# Patient Record
Sex: MALE | Race: WHITE | NOT HISPANIC OR LATINO | Employment: PART TIME | URBAN - METROPOLITAN AREA
[De-identification: names, ages, dates, MRNs, and addresses within clinical notes are randomized per-mention and may not be internally consistent; named-entity substitution may affect disease eponyms.]

---

## 2017-02-02 ENCOUNTER — ALLSCRIPTS OFFICE VISIT (OUTPATIENT)
Dept: OTHER | Facility: OTHER | Age: 67
End: 2017-02-02

## 2017-03-02 ENCOUNTER — APPOINTMENT (OUTPATIENT)
Dept: LAB | Facility: HOSPITAL | Age: 67
End: 2017-03-02
Payer: COMMERCIAL

## 2017-03-02 ENCOUNTER — TRANSCRIBE ORDERS (OUTPATIENT)
Dept: LAB | Facility: HOSPITAL | Age: 67
End: 2017-03-02

## 2017-03-02 DIAGNOSIS — I10 ESSENTIAL (PRIMARY) HYPERTENSION: ICD-10-CM

## 2017-03-02 DIAGNOSIS — E11.65 UNCONTROLLED TYPE 2 DIABETES MELLITUS WITH COMPLICATION, UNSPECIFIED LONG TERM INSULIN USE STATUS: Primary | ICD-10-CM

## 2017-03-02 DIAGNOSIS — E11.8 UNCONTROLLED TYPE 2 DIABETES MELLITUS WITH COMPLICATION, UNSPECIFIED LONG TERM INSULIN USE STATUS: Primary | ICD-10-CM

## 2017-03-02 DIAGNOSIS — E11.65 TYPE 2 DIABETES MELLITUS WITH HYPERGLYCEMIA (HCC): ICD-10-CM

## 2017-03-02 LAB
ALBUMIN SERPL BCP-MCNC: 3.5 G/DL (ref 3.5–5)
ALP SERPL-CCNC: 61 U/L (ref 46–116)
ALT SERPL W P-5'-P-CCNC: 47 U/L (ref 12–78)
ANION GAP SERPL CALCULATED.3IONS-SCNC: 7 MMOL/L (ref 4–13)
AST SERPL W P-5'-P-CCNC: 24 U/L (ref 5–45)
BACTERIA UR QL AUTO: NORMAL /HPF
BASOPHILS # BLD AUTO: 0.03 THOUSANDS/ΜL (ref 0–0.1)
BASOPHILS NFR BLD AUTO: 1 % (ref 0–1)
BILIRUB SERPL-MCNC: 0.73 MG/DL (ref 0.2–1)
BILIRUB UR QL STRIP: NEGATIVE
BUN SERPL-MCNC: 11 MG/DL (ref 5–25)
CALCIUM SERPL-MCNC: 8.6 MG/DL (ref 8.3–10.1)
CHLORIDE SERPL-SCNC: 106 MMOL/L (ref 100–108)
CHOLEST SERPL-MCNC: 136 MG/DL (ref 50–200)
CLARITY UR: CLEAR
CO2 SERPL-SCNC: 29 MMOL/L (ref 21–32)
COLOR UR: NORMAL
CREAT SERPL-MCNC: 0.9 MG/DL (ref 0.6–1.3)
CREAT UR-MCNC: 230 MG/DL
EOSINOPHIL # BLD AUTO: 0.33 THOUSAND/ΜL (ref 0–0.61)
EOSINOPHIL NFR BLD AUTO: 6 % (ref 0–6)
ERYTHROCYTE [DISTWIDTH] IN BLOOD BY AUTOMATED COUNT: 13.5 % (ref 11.6–15.1)
EST. AVERAGE GLUCOSE BLD GHB EST-MCNC: 163 MG/DL
GFR SERPL CREATININE-BSD FRML MDRD: >60 ML/MIN/1.73SQ M
GLUCOSE SERPL-MCNC: 139 MG/DL (ref 65–140)
GLUCOSE UR STRIP-MCNC: NEGATIVE MG/DL
HBA1C MFR BLD: 7.3 % (ref 4.2–6.3)
HCT VFR BLD AUTO: 43.3 % (ref 36.5–49.3)
HDLC SERPL-MCNC: 59 MG/DL (ref 40–60)
HGB BLD-MCNC: 14.6 G/DL (ref 12–17)
HGB UR QL STRIP.AUTO: NEGATIVE
HYALINE CASTS #/AREA URNS LPF: NORMAL /LPF
KETONES UR STRIP-MCNC: NEGATIVE MG/DL
LDLC SERPL CALC-MCNC: 63 MG/DL (ref 0–100)
LEUKOCYTE ESTERASE UR QL STRIP: NEGATIVE
LYMPHOCYTES # BLD AUTO: 2.23 THOUSANDS/ΜL (ref 0.6–4.47)
LYMPHOCYTES NFR BLD AUTO: 37 % (ref 14–44)
MCH RBC QN AUTO: 29.9 PG (ref 26.8–34.3)
MCHC RBC AUTO-ENTMCNC: 33.7 G/DL (ref 31.4–37.4)
MCV RBC AUTO: 89 FL (ref 82–98)
MICROALBUMIN UR-MCNC: 45 MG/L (ref 0–20)
MICROALBUMIN/CREAT 24H UR: 20 MG/G CREATININE (ref 0–30)
MONOCYTES # BLD AUTO: 0.36 THOUSAND/ΜL (ref 0.17–1.22)
MONOCYTES NFR BLD AUTO: 6 % (ref 4–12)
NEUTROPHILS # BLD AUTO: 3.06 THOUSANDS/ΜL (ref 1.85–7.62)
NEUTS SEG NFR BLD AUTO: 50 % (ref 43–75)
NITRITE UR QL STRIP: NEGATIVE
NON-SQ EPI CELLS URNS QL MICRO: NORMAL /HPF
NRBC BLD AUTO-RTO: 0 /100 WBCS
PH UR STRIP.AUTO: 6 [PH] (ref 4.5–8)
PLATELET # BLD AUTO: 203 THOUSANDS/UL (ref 149–390)
PMV BLD AUTO: 10.3 FL (ref 8.9–12.7)
POTASSIUM SERPL-SCNC: 4.1 MMOL/L (ref 3.5–5.3)
PROT SERPL-MCNC: 6.5 G/DL (ref 6.4–8.2)
PROT UR STRIP-MCNC: NEGATIVE MG/DL
PSA SERPL-MCNC: 1 NG/ML (ref 0–4)
RBC # BLD AUTO: 4.88 MILLION/UL (ref 3.88–5.62)
RBC #/AREA URNS AUTO: NORMAL /HPF
SODIUM SERPL-SCNC: 142 MMOL/L (ref 136–145)
SP GR UR STRIP.AUTO: 1.02 (ref 1–1.03)
T4 FREE SERPL-MCNC: 0.91 NG/DL (ref 0.76–1.46)
TRIGL SERPL-MCNC: 71 MG/DL
TSH SERPL DL<=0.05 MIU/L-ACNC: 1.39 UIU/ML (ref 0.36–3.74)
UROBILINOGEN UR QL STRIP.AUTO: 0.2 E.U./DL
WBC # BLD AUTO: 6.02 THOUSAND/UL (ref 4.31–10.16)
WBC #/AREA URNS AUTO: NORMAL /HPF

## 2017-03-02 PROCEDURE — 81001 URINALYSIS AUTO W/SCOPE: CPT | Performed by: INTERNAL MEDICINE

## 2017-03-02 PROCEDURE — 80061 LIPID PANEL: CPT

## 2017-03-02 PROCEDURE — 83036 HEMOGLOBIN GLYCOSYLATED A1C: CPT

## 2017-03-02 PROCEDURE — 84439 ASSAY OF FREE THYROXINE: CPT

## 2017-03-02 PROCEDURE — 80053 COMPREHEN METABOLIC PANEL: CPT

## 2017-03-02 PROCEDURE — G0103 PSA SCREENING: HCPCS

## 2017-03-02 PROCEDURE — 82570 ASSAY OF URINE CREATININE: CPT | Performed by: INTERNAL MEDICINE

## 2017-03-02 PROCEDURE — 82043 UR ALBUMIN QUANTITATIVE: CPT | Performed by: INTERNAL MEDICINE

## 2017-03-02 PROCEDURE — 84443 ASSAY THYROID STIM HORMONE: CPT

## 2017-03-02 PROCEDURE — 85025 COMPLETE CBC W/AUTO DIFF WBC: CPT

## 2017-03-02 PROCEDURE — 36415 COLL VENOUS BLD VENIPUNCTURE: CPT

## 2017-03-04 ENCOUNTER — LAB (OUTPATIENT)
Dept: LAB | Facility: HOSPITAL | Age: 67
End: 2017-03-04
Payer: COMMERCIAL

## 2017-03-04 DIAGNOSIS — E78.5 HYPERLIPIDEMIA: ICD-10-CM

## 2017-03-04 DIAGNOSIS — Z00.00 ENCOUNTER FOR GENERAL ADULT MEDICAL EXAMINATION WITHOUT ABNORMAL FINDINGS: ICD-10-CM

## 2017-03-04 DIAGNOSIS — I10 UNSPECIFIED ESSENTIAL HYPERTENSION: Primary | ICD-10-CM

## 2017-03-04 LAB — HEMOCCULT STL QL IA: POSITIVE

## 2017-03-04 PROCEDURE — G0328 FECAL BLOOD SCRN IMMUNOASSAY: HCPCS

## 2017-03-13 ENCOUNTER — ALLSCRIPTS OFFICE VISIT (OUTPATIENT)
Dept: OTHER | Facility: OTHER | Age: 67
End: 2017-03-13

## 2017-03-13 DIAGNOSIS — E11.65 TYPE 2 DIABETES MELLITUS WITH HYPERGLYCEMIA (HCC): ICD-10-CM

## 2017-03-13 LAB
LEFT EYE DIABETIC RETINOPATHY: NORMAL
RIGHT EYE DIABETIC RETINOPATHY: NORMAL

## 2017-03-15 ENCOUNTER — GENERIC CONVERSION - ENCOUNTER (OUTPATIENT)
Dept: OTHER | Facility: OTHER | Age: 67
End: 2017-03-15

## 2017-03-16 ENCOUNTER — ALLSCRIPTS OFFICE VISIT (OUTPATIENT)
Dept: OTHER | Facility: OTHER | Age: 67
End: 2017-03-16

## 2017-06-14 ENCOUNTER — GENERIC CONVERSION - ENCOUNTER (OUTPATIENT)
Dept: OTHER | Facility: OTHER | Age: 67
End: 2017-06-14

## 2017-07-01 ENCOUNTER — TRANSCRIBE ORDERS (OUTPATIENT)
Dept: LAB | Facility: HOSPITAL | Age: 67
End: 2017-07-01

## 2017-07-01 ENCOUNTER — APPOINTMENT (OUTPATIENT)
Dept: LAB | Facility: HOSPITAL | Age: 67
End: 2017-07-01
Payer: COMMERCIAL

## 2017-07-01 DIAGNOSIS — E11.65 TYPE 2 DIABETES MELLITUS WITH HYPERGLYCEMIA (HCC): ICD-10-CM

## 2017-07-01 LAB
EST. AVERAGE GLUCOSE BLD GHB EST-MCNC: 154 MG/DL
GLUCOSE P FAST SERPL-MCNC: 131 MG/DL (ref 65–99)
HBA1C MFR BLD: 7 % (ref 4.2–6.3)

## 2017-07-01 PROCEDURE — 82947 ASSAY GLUCOSE BLOOD QUANT: CPT

## 2017-07-01 PROCEDURE — 36415 COLL VENOUS BLD VENIPUNCTURE: CPT

## 2017-07-01 PROCEDURE — 83036 HEMOGLOBIN GLYCOSYLATED A1C: CPT

## 2017-07-14 ENCOUNTER — ALLSCRIPTS OFFICE VISIT (OUTPATIENT)
Dept: OTHER | Facility: OTHER | Age: 67
End: 2017-07-14

## 2017-07-14 DIAGNOSIS — E55.9 VITAMIN D DEFICIENCY: ICD-10-CM

## 2017-07-14 DIAGNOSIS — E11.65 TYPE 2 DIABETES MELLITUS WITH HYPERGLYCEMIA (HCC): ICD-10-CM

## 2017-11-13 ENCOUNTER — TRANSCRIBE ORDERS (OUTPATIENT)
Dept: LAB | Facility: HOSPITAL | Age: 67
End: 2017-11-13

## 2017-11-13 ENCOUNTER — APPOINTMENT (OUTPATIENT)
Dept: LAB | Facility: HOSPITAL | Age: 67
End: 2017-11-13
Payer: COMMERCIAL

## 2017-11-13 DIAGNOSIS — E11.65 TYPE 2 DIABETES MELLITUS WITH HYPERGLYCEMIA (HCC): ICD-10-CM

## 2017-11-13 DIAGNOSIS — E55.9 VITAMIN D DEFICIENCY: ICD-10-CM

## 2017-11-13 LAB
25(OH)D3 SERPL-MCNC: 55.5 NG/ML (ref 30–100)
EST. AVERAGE GLUCOSE BLD GHB EST-MCNC: 146 MG/DL
GLUCOSE P FAST SERPL-MCNC: 149 MG/DL (ref 65–99)
HBA1C MFR BLD: 6.7 % (ref 4.2–6.3)

## 2017-11-13 PROCEDURE — 36415 COLL VENOUS BLD VENIPUNCTURE: CPT

## 2017-11-13 PROCEDURE — 82947 ASSAY GLUCOSE BLOOD QUANT: CPT

## 2017-11-13 PROCEDURE — 82306 VITAMIN D 25 HYDROXY: CPT

## 2017-11-13 PROCEDURE — 83036 HEMOGLOBIN GLYCOSYLATED A1C: CPT

## 2017-11-27 ENCOUNTER — GENERIC CONVERSION - ENCOUNTER (OUTPATIENT)
Dept: OTHER | Facility: OTHER | Age: 67
End: 2017-11-27

## 2017-11-27 DIAGNOSIS — Z12.11 ENCOUNTER FOR SCREENING FOR MALIGNANT NEOPLASM OF COLON: ICD-10-CM

## 2017-11-27 DIAGNOSIS — E11.65 TYPE 2 DIABETES MELLITUS WITH HYPERGLYCEMIA (HCC): ICD-10-CM

## 2017-11-27 DIAGNOSIS — E55.9 VITAMIN D DEFICIENCY: ICD-10-CM

## 2018-01-12 VITALS
TEMPERATURE: 98.4 F | OXYGEN SATURATION: 98 % | HEART RATE: 89 BPM | WEIGHT: 199.38 LBS | HEIGHT: 68 IN | SYSTOLIC BLOOD PRESSURE: 136 MMHG | DIASTOLIC BLOOD PRESSURE: 86 MMHG | BODY MASS INDEX: 30.22 KG/M2

## 2018-01-13 VITALS
DIASTOLIC BLOOD PRESSURE: 82 MMHG | HEIGHT: 68 IN | OXYGEN SATURATION: 97 % | BODY MASS INDEX: 29.76 KG/M2 | TEMPERATURE: 97.8 F | HEART RATE: 103 BPM | WEIGHT: 196.38 LBS | SYSTOLIC BLOOD PRESSURE: 138 MMHG

## 2018-01-13 VITALS
BODY MASS INDEX: 29.4 KG/M2 | DIASTOLIC BLOOD PRESSURE: 82 MMHG | HEIGHT: 68 IN | SYSTOLIC BLOOD PRESSURE: 136 MMHG | TEMPERATURE: 96.8 F | HEART RATE: 77 BPM | WEIGHT: 194 LBS | OXYGEN SATURATION: 98 %

## 2018-01-14 VITALS
DIASTOLIC BLOOD PRESSURE: 74 MMHG | HEART RATE: 93 BPM | OXYGEN SATURATION: 99 % | TEMPERATURE: 96.8 F | HEIGHT: 68 IN | WEIGHT: 196 LBS | BODY MASS INDEX: 29.7 KG/M2 | SYSTOLIC BLOOD PRESSURE: 122 MMHG

## 2018-01-17 NOTE — PROGRESS NOTES
Assessment    1  Type 2 diabetes mellitus with hyperglycemia (250 00) (E11 65)   2  Hyperlipidemia (272 4) (E78 5)   3  Encounter for preventive health examination (V70 0) (Z00 00)   4  Vitamin D deficiency (268 9) (E55 9)    Plan  Health Maintenance, Hyperlipidemia    · (1) TSH WITH FT4 REFLEX; Status:Active - Retrospective By Protocol Authorization; Requested for:25Mar2016; Health Maintenance, Hyperlipidemia, Type 2 diabetes mellitus with hyperglycemia    · (1) CBC/PLT/DIFF; Status:Active - Retrospective By Protocol Authorization; Requested  for:25Mar2016;    · (1) COMPREHENSIVE METABOLIC PANEL; Status:Active - Retrospective By Protocol  Authorization; Requested for:25Mar2016;    · (1) PSA FREE & TOTAL; Status:Active - Retrospective By Protocol Authorization; Requested for:25Mar2016; Health Maintenance, Vitamin D deficiency    · (1) VITAMIN D 25-HYDROXY; Status:Active - Retrospective By Protocol Authorization; Requested for:25Mar2016;   Hyperlipidemia    · Atorvastatin Calcium 10 MG Oral Tablet   · (1) LIPID PANEL FASTING W DIRECT LDL REFLEX; Status:Active - Retrospective By  Protocol Authorization; Requested for:25Mar2016;   Type 2 diabetes mellitus with hyperglycemia    · (1) GLUCOSE,  FASTING; Status:Active; Requested for:25Mar2016;    · (1) HEMOGLOBIN A1C; Status:Active; Requested for:25Mar2016;    · *VB-Foot Exam; Status:Active; Requested for:25Mar2016;    · Follow-up visit in 6 months Evaluation and Treatment  Follow-up  Status: Hold For -  Scheduling  Requested for: [id number]    Discussion/Summary  Impression: health maintenance visit  Currently, he eats an adequate diet and has an inadequate exercise regimen  Prostate cancer screening: the risks and benefits of prostate cancer screening were discussed, prostate cancer screening is current and PSA was ordered   Testicular cancer screening: the risks and benefits of testicular cancer screening were discussed and testicular cancer screening is current  Colorectal cancer screening: the risks and benefits of colorectal cancer screening were discussed, fecal occult blood testing is needed every year and colonoscopy has been ordered  The risks and benefits of immunizations were discussed, immunizations are needed and patient declines immunizations  Advice and education were given regarding nutrition, aerobic exercise, weight loss and vitamin D supplements  Patient discussion: discussed with the patient  #1  Diabetes mellitus type 2  As noted patient did not have his labs done prior to that visit today so we do not know about his control of his diabetes  Patient's last hemoglobin A1c was 6 8  Patient promises to have the labs done within the next week and we will review the results and we will also have these checked again in another 6 months  If his sugars out of control we'll modify treatment  #2  Hyperlipidemia  As noted we have no idea where his cholesterol is and again he will have the labs done in the near future  #3  Vitamin D deficiency  Patient was encouraged to continue his supplements and we'll check a level  #4  Health maintenance  I discussed with the patient's the importance of seeing the eye doctor on a yearly basis with his diabetes  Importantly also was the importance of having routine colonoscopy  Patient's labs should include a PSA and his digital rectal examination was essentially normal    Possible side effects of new medications were reviewed with the patient/guardian today  The patient was counseled regarding instructions for management, risk factor reductions, prognosis, patient and family education, impressions, risks and benefits of treatment options, importance of compliance with treatment  Chief Complaint  Patient is here today for his yearly physical       History of Present Illness  , Adult Male: The patient is being seen for a health maintenance evaluation   The last health maintenance visit was One year year(s) ago  Social History: Household members include spouse  He is   Work status: working full time and ,   The patient is a former cigarette smoker  He has smoked for 20 pack years year(s)  He reports rare alcohol use and He states he is a social drinker  The patient has no concerns about alcohol abuse  He has never used illicit drugs  General Health: The patient's health since the last visit is described as good  He does not have regular dental visits  He denies vision problems  He denies hearing loss  Immunizations status: not up to date  Lifestyle:  He consumes a diverse and healthy diet  He does not have any weight concerns  He does not exercise regularly  He does not use tobacco  He consumes alcohol  He denies drug use  Reproductive health:  the patient is sexually active  birth control is not being practiced  He denies erectile dysfunction  Screening: cancer screening reviewed and updated  metabolic screening reviewed and updated  risk screening reviewed and updated  HPI: Patient is a 61-year-old male with a history of diabetes mellitus type 2, vitamin D deficiency, hyperlipidemia  Patient is here today for routine physical  Patient neglected to have routine labs prior to his visit today  He states he's been feeling well and is working hard and has no new complaints or problems  Patient promises to have the labs performed in the very near future  Patient is been neglectful as far as his diet and exercise program is concerned that he states that's because she's been working so very hard  He denies any chest pain or shortness of breath  He states his appetite is good and he stays away from concentrated sweets is much as possible  Review of Systems    Constitutional: No fever or chills, feels well, no tiredness, no recent weight gain or weight loss  Eyes: No complaints of eye pain, no red eyes, no discharge from eyes, no itchy eyes     ENT: no complaints of earache, no hearing loss, no nosebleeds, no nasal discharge, no sore throat, no hoarseness  Cardiovascular: No complaints of slow heart rate, no fast heart rate, no chest pain, no palpitations, no leg claudication, no lower extremity  Respiratory: No complaints of shortness of breath, no wheezing, no cough, no SOB on exertion, no orthopnea or PND  Gastrointestinal: No complaints of abdominal pain, no constipation, no nausea or vomiting, no diarrhea or bloody stools  Genitourinary: No complaints of dysuria, no incontinence, no hesitancy, no nocturia, no genital lesion, no testicular pain  Musculoskeletal: No complaints of arthralgia, no myalgias, no joint swelling or stiffness, no limb pain or swelling  Integumentary: No complaints of skin rash or skin lesions, no itching, no skin wound, no dry skin  Neurological: No compliants of headache, no confusion, no convulsions, no numbness or tingling, no dizziness or fainting, no limb weakness, no difficulty walking  Psychiatric: Is not suicidal, no sleep disturbances, no anxiety or depression, no change in personality, no emotional problems  Endocrine: No complaints of proptosis, no hot flashes, no muscle weakness, no erectile dysfunction, no deepening of the voice, no feelings of weakness  Hematologic/Lymphatic: No complaints of swollen glands, no swollen glands in the neck, does not bleed easily, no easy bruising  Active Problems    1  Acute sinusitis (461 9) (J01 90)   2  Hyperlipidemia (272 4) (E78 5)   3  Type 2 diabetes mellitus with hyperglycemia (250 00) (E11 65)   4  Upper Respiratory Tract Disorders (478 9)   5   Vitamin D deficiency (268 9) (E55 9)    Past Medical History    · History of hyperlipidemia (V12 29) (Z86 39)    Surgical History    · History of Appendectomy    Family History    · Family history of Parkinson Disease    · Family history of Diabetes Mellitus (V18 0)   · Family history of Malignant Pancreatic Neoplasm    Social History    · Being A Social Drinker   · Former smoker (G70 14) (O82 600)    Current Meds   1  Atorvastatin Calcium 10 MG Oral Tablet; take 1 tablet by mouth one time daily; Therapy: 86CFQ8351 to (Evaluate:11Mar2017)  Requested for: 69WLS8605; Last   Rx:16Mar2016 Ordered   2  Atorvastatin Calcium 10 MG Oral Tablet; take 1 tablet by mouth one time daily; Therapy: 86OXX4530 to (Last Rx:26Qys3500)  Requested for: 79Tww8125 Ordered   3  MetFORMIN HCl ER (OSM) 500 MG Oral Tablet Extended Release 24 Hour; Two pills   daily; Therapy: 22XAM3783 to (Last Rx:03Mar2015)  Requested for: 52OJA4732 Ordered   4  MetFORMIN HCl  MG Oral Tablet Extended Release 24 Hour; take 1 tablet by   mouth one time daily; Therapy: 65JLV0637 to (Evaluate:05Jan2017)  Requested for: 27ZXY3514; Last   Rx:11Jan2016 Ordered   5  MetFORMIN HCl  MG Oral Tablet Extended Release 24 Hour; take 1 tablet by   mouth one time daily; Therapy: 30SPO7994 to (Last Candace Flair)  Requested for: 63Tcn8900 Ordered   6  Vitamin D (Ergocalciferol) 44955 UNIT Oral Capsule; Therapy: 75DLQ9178 to (Last Rx:49Ecg6779)  Requested for: 11Eoe3992 Ordered    Allergies    1  No Known Drug Allergies    Vitals   Recorded: 33PDG3490 01:35PM   Temperature 98 2 F   Heart Rate 102   Respiration 18   Systolic 693   Diastolic 86   Height 5 ft 8 in   Weight 193 lb 4 00 oz   BMI Calculated 29 38   BSA Calculated 2 02   O2 Saturation 97     Physical Exam    Constitutional Pleasant healthy-appearing articulate 62-year-old male who is awake alert in no acute distress and oriented x3  Head and Face   Head and face: Normal   Normal male pattern balding  Palpation of the face and sinuses: No sinus tenderness  Eyes   Conjunctiva and lids: No erythema, swelling or discharge  Pupils and irises: Equal, round, reactive to light  Posterior small unable to see fundi the patient does get yearly exams     Ears, Nose, Mouth, and Throat   External inspection of ears and nose: Normal     Otoscopic examination: Tympanic membranes translucent with normal light reflex  Canals patent without erythema  Hearing: Normal     Nasal mucosa, septum, and turbinates: Normal without edema or erythema  Lips, teeth, and gums: Normal, good dentition  Oropharynx: Normal with no erythema, edema, exudate or lesions  Neck   Neck: Supple, symmetric, trachea midline, no masses  Thyroid: Normal, no thyromegaly  Pulmonary   Respiratory effort: No increased work of breathing or signs of respiratory distress  Percussion of chest: Normal     Palpation of chest: Normal     Auscultation of lungs: Clear to auscultation  Cardiovascular   Palpation of heart: Normal PMI, no thrills  Auscultation of heart: Normal rate and rhythm, normal S1 and S2, no murmurs  Carotid pulses: 2+ bilaterally  Abdominal aorta: Normal     Femoral pulses: 2+ bilaterally  Pedal pulses: 2+ bilaterally  Peripheral vascular exam: Normal     Examination of extremities for edema and/or varicosities: Normal     Chest   Breasts: Normal, no dimpling or skin changes appreciated  Palpation of breasts and axillae: Normal, no masses palpated  Chest: Normal     Abdomen   Abdomen: Non-tender, no masses  Liver and spleen: No hepatomegaly or splenomegaly  Examination for hernias: No hernias appreciated  Anus, perineum, and rectum: Normal sphincter tone, no masses, no prolapse  Genitourinary   Scrotal contents: Normal testes, no masses  Penis: Normal, no lesions  Digital rectal exam of prostate: Abnormal   Mildly enlarged prostate but no masses  Lymphatic   Palpation of lymph nodes in neck: No lymphadenopathy  Palpation of lymph nodes in axillae: No lymphadenopathy  Palpation of lymph nodes in groin: No lymphadenopathy  Palpation of lymph nodes in other areas: No lymphadenopathy      Musculoskeletal   Gait and station: Normal     Inspection/palpation of digits and nails: Normal without clubbing or cyanosis  Inspection/palpation of joints, bones, and muscles: Normal     Range of motion: Normal     Stability: Normal     Muscle strength/tone: Normal     Skin   Skin and subcutaneous tissue: Normal without rashes or lesions  Palpation of skin and subcutaneous tissue: Normal turgor  Neurologic   Cranial nerves: Cranial nerves 2-12 intact  Cortical function: Normal mental status  Reflexes: 2+ and symmetric  Sensation: No sensory loss  Coordination: Normal finger to nose and heel to shin  Diabetic Foot Exam: Right Foot Findings: normal foot  The toes were normal and had full range of motion  Left Foot Findings: normal foot  The toes were normal and had full range of motion  Monofilament Testing: normal tactile sensation with monofilament testing throughout both feet  Vascular: Capillary refills findings on the right were normal in the toes  Capillary refills findings on the left were normal in the toes  Psychiatric   Judgment and insight: Normal     Orientation to person, place and time: Normal     Recent and remote memory: Intact  Mood and affect: Normal        Health Management  Type 2 diabetes mellitus with hyperglycemia   *VB - Eye Exam; every 1 year; Next Due: 20Apr2015;  Overdue    Future Appointments    Date/Time Provider Specialty Site   09/30/2016 01:30 PM Eleni Guzmán DO Internal Medicine [facility] INTERNAL MED     Signatures   Electronically signed by : Sissy Venegas DO; Mar 25 2016  4:34PM EST                       (Author)

## 2018-01-22 ENCOUNTER — ALLSCRIPTS OFFICE VISIT (OUTPATIENT)
Dept: OTHER | Facility: OTHER | Age: 68
End: 2018-01-22

## 2018-01-22 VITALS
HEIGHT: 68 IN | BODY MASS INDEX: 29.18 KG/M2 | OXYGEN SATURATION: 98 % | WEIGHT: 192.5 LBS | HEART RATE: 81 BPM | DIASTOLIC BLOOD PRESSURE: 76 MMHG | SYSTOLIC BLOOD PRESSURE: 124 MMHG | TEMPERATURE: 97.7 F

## 2018-01-23 NOTE — PROGRESS NOTES
Assessment   1  Hypertension (401 9) (I10)   2  Acute sinusitis (461 9) (J01 90)   3  Type 2 diabetes mellitus with hyperglycemia (250 00) (E11 65)    Plan   Hypertension    · Lisinopril 10 MG Oral Tablet; Take 1 tablet daily    Discussion/Summary      1  Hypertension  As noted patient's blood pressure was elevated an acute visit approximately 10 days ago when he was seen in [place] and he was placed on lisinopril with hydrochlorothiazide which is appropriate medication  Despite started on the medication his blood pressure is still not to goal and we have taken away the hydrochlorothiazide as a component of this medication and he has been started on lisinopril 10 mg daily  He was informed that the medication can have a problem with paroxysmal cough  He is due to be seen in the office in a few weeks for re-evaluation  Was told to stop the medication or call us immediately if any problems with lightheadedness or dizziness  Acute sinusitis  Patient states he is improving overall but he still has residual cough  We will be checking the patient in the near future and he was told if still problems with cough might switch him from lisinopril to another medication to control his blood pressure  Diabetes mellitus type 2  Patient is due to have testing done in the near future looking at a hemoglobin A1c, fasting blood sugar  The patient was counseled regarding diagnostic results,-- instructions for management,-- risk factor reductions,-- prognosis,-- patient and family education,-- impressions,-- risks and benefits of treatment options,-- importance of compliance with treatment  Possible side effects of new medications were reviewed with the patient/guardian today  The treatment plan was reviewed with the patient/guardian   The patient/guardian understands and agrees with the treatment plan      Chief Complaint   Patient is here today for elevated blood pressure      History of Present Illness   HPI: Patient is a 59-year-old male with history of hypertension, hyperlipidemia, diabetes mellitus type 2  Patient relates that he was out of town in [place] and had signs and symptoms of an upper respiratory, sinus infection  Patient was seen in the urgent care facility was noted to have a blood pressure elevated with systolic in the 563N  He was told that this need to be treated immediately and besides being placed on penicillin for sinus infection he was also placed at the same time on lisinopril hydrochlorothiazide for his blood pressure  Patient was given a 10 day course of the medication and states he has been taking it daily and is here for blood pressure check  As noted patient's blood pressure is improved but is not down to his baseline  He states that while he was suffering from an upper respiratory tract infection he was taking some over-the-counter decongestants which may have falsely elevated his blood pressure readings  Hospital Based Practices Required Assessment:      Pain Assessment      the patient states they do not have pain  Abuse And Domestic Violence Screen       Yes, the patient is safe at home  -- The patient states no one is hurting them  Depression And Suicide Screen  No, the patient has not had thoughts of hurting themself  No, the patient has not felt depressed in the past 7 days  Primary Language is  English  Readiness To Learn: Receptive  Barriers To Learning: none  Preferred Learning: verbal      Education Completed: disease/condition,-- medications-- and-- further treatment/follow-up      Teaching Method: verbal      Person Taught: patient      Review of Systems        Constitutional: no fever,-- not feeling poorly,-- no recent weight gain,-- no chills,-- not feeling tired-- and-- no recent weight loss  ENT: nasal discharge, but-- no earache,-- no nosebleeds,-- no sore throat,-- no hearing loss-- and-- no hoarseness        Cardiovascular: no complaints of slow or fast heart rate, no chest pain, no palpitations, no leg claudication or lower extremity edema  Respiratory: cough-- and-- Patient states he still has a persistent cough which was there before he was treated with blood pressure medication  He states that is getting better, but-- no shortness of breath,-- no orthopnea,-- no wheezing,-- no shortness of breath during exertion-- and-- no PND  Gastrointestinal: no complaints of abdominal pain, no constipation, no nausea or vomiting, no diarrhea or bloody stools  Genitourinary: no complaints of dysuria or incontinence, no hesitancy, no nocturia, no genital lesion, no inadequacy of penile erection  Musculoskeletal: no complaints of arthralgia, no myalgia, no joint swelling or stiffness, no limb pain or swelling  Integumentary: no complaints of skin rash or lesion, no itching or dry skin, no skin wounds  Neurological: no complaints of headache, no confusion, no numbness or tingling, no dizziness or fainting  Active Problems   1  Acute sinusitis (461 9) (J01 90)   2  Bronchitis (490) (J40)   3  Colon cancer screening (V76 51) (Z12 11)   4  Hyperlipidemia (272 4) (E78 5)   5  Hypertension (401 9) (I10)   6  Type 2 diabetes mellitus with hyperglycemia (250 00) (E11 65)   7  Upper Respiratory Tract Disorders (478 9)   8  Vitamin D deficiency (268 9) (E55 9)   9  Vomiting (787 03) (R11 10)    Past Medical History   Active Problems And Past Medical History Reviewed: The active problems and past medical history were reviewed and updated today  Surgical History   Surgical History Reviewed: The surgical history was reviewed and updated today  Social History    · Being A Social Drinker   · Former smoker (N25 87) (O42 492)  The social history was reviewed and updated today  The social history was reviewed and is unchanged  Family History   Family History Reviewed: The family history was reviewed and updated today  Current Meds    1  Atorvastatin Calcium 10 MG Oral Tablet; Take 1 tablet daily; Therapy: 82KXY6973 to (Last Rx:77Ajr8535)  Requested for: 51CEJ2169 Ordered   2  Lisinopril-Hydrochlorothiazide 10-12 5 MG Oral Tablet; take one tablet by mouth every     day; Therapy: 16ALP2487 to (Evaluate:17Jan2019) Recorded   3  MetFORMIN HCl  MG Oral Tablet Extended Release 24 Hour; TAKE 1 TABLET     DAILY; Therapy: 41RDG4019 to (Ayo Mahsa)  Requested for: 24Oct2016; Last     Rx:24Oct2016 Ordered   4  Vitamin D (Ergocalciferol) 18473 UNIT Oral Capsule; Therapy: 36IUS0618 to (Last Rx:12Lyc6752)  Requested for: 77Ooc2822 Ordered     The medication list was reviewed and updated today  Allergies   1  No Known Drug Allergies    Vitals    Recorded: 47CLY3864 12:53PM   Temperature 97 4 F   Heart Rate 97   Systolic 324   Diastolic 82   Height 5 ft 8 in   Weight 194 lb    BMI Calculated 29 5   BSA Calculated 2 02   O2 Saturation 98     Physical Exam        Constitutional Pleasant cheerful 44-year-old male who is awake alert in no acute distress  Ears, Nose, Mouth, and Throat      External inspection of ears and nose: Normal        Otoscopic examination: Tympanic membrance translucent with normal light reflex  Canals patent without erythema  Nasal mucosa, septum, and turbinates: Abnormal  -- Slight inflammation and erythema to near ease with clear nasal discharge  Oropharynx: Abnormal  -- Mild erythema to posterior airway with a whitish postnasal drip  Pulmonary      Respiratory effort: No increased work of breathing or signs of respiratory distress  Auscultation of lungs: Clear to auscultation, equal breath sounds bilaterally, no wheezes, no rales, no rhonci  Cardiovascular      Palpation of heart: Normal PMI, no thrills  Auscultation of heart: Normal rate and rhythm, normal S1 and S2, without murmurs         Examination of extremities for edema and/or varicosities: Normal        Carotid pulses: Normal        Musculoskeletal      Gait and station: Normal        Skin      Skin and subcutaneous tissue: Normal without rashes or lesions  Neurologic      Cranial nerves: Cranial nerves 2-12 intact         Psychiatric      Orientation to person, place and time: Normal        Mood and affect: Normal           Future Appointments      Date/Time Provider Specialty Site   03/30/2018 02:00 PM Lesa Birmingham DO Internal Medicine [facility] [place] [facility] 40    Electronically signed by : Katherine Ling DO; Jan 22 2018  1:35PM EST                       (Author)

## 2018-01-30 DIAGNOSIS — E78.00 PURE HYPERCHOLESTEROLEMIA: Primary | ICD-10-CM

## 2018-01-30 RX ORDER — ATORVASTATIN CALCIUM 10 MG/1
1 TABLET, FILM COATED ORAL DAILY
COMMUNITY
Start: 2013-11-20 | End: 2018-01-30 | Stop reason: SDUPTHER

## 2018-01-30 RX ORDER — ATORVASTATIN CALCIUM 10 MG/1
10 TABLET, FILM COATED ORAL DAILY
Qty: 90 TABLET | Refills: 3 | Status: SHIPPED | OUTPATIENT
Start: 2018-01-30 | End: 2018-04-06 | Stop reason: SDUPTHER

## 2018-02-26 DIAGNOSIS — J06.9 UPPER RESPIRATORY TRACT INFECTION, UNSPECIFIED TYPE: Primary | ICD-10-CM

## 2018-02-26 RX ORDER — AMOXICILLIN 500 MG/1
500 CAPSULE ORAL EVERY 8 HOURS SCHEDULED
Status: DISCONTINUED | OUTPATIENT
Start: 2018-02-26 | End: 2018-12-03

## 2018-04-06 DIAGNOSIS — E78.00 PURE HYPERCHOLESTEROLEMIA: ICD-10-CM

## 2018-04-06 RX ORDER — ATORVASTATIN CALCIUM 10 MG/1
10 TABLET, FILM COATED ORAL DAILY
Qty: 90 TABLET | Refills: 2 | Status: SHIPPED | OUTPATIENT
Start: 2018-04-06 | End: 2018-06-04 | Stop reason: SDUPTHER

## 2018-04-07 ENCOUNTER — TRANSCRIBE ORDERS (OUTPATIENT)
Dept: ADMINISTRATIVE | Age: 68
End: 2018-04-07

## 2018-04-07 ENCOUNTER — APPOINTMENT (OUTPATIENT)
Dept: LAB | Age: 68
End: 2018-04-07
Payer: COMMERCIAL

## 2018-04-07 DIAGNOSIS — Z12.11 ENCOUNTER FOR SCREENING FOR MALIGNANT NEOPLASM OF COLON: ICD-10-CM

## 2018-04-07 DIAGNOSIS — E55.9 VITAMIN D DEFICIENCY: ICD-10-CM

## 2018-04-07 DIAGNOSIS — E11.65 TYPE 2 DIABETES MELLITUS WITH HYPERGLYCEMIA (HCC): ICD-10-CM

## 2018-04-07 LAB
25(OH)D3 SERPL-MCNC: 40.9 NG/ML (ref 30–100)
ALBUMIN SERPL BCP-MCNC: 4 G/DL (ref 3.5–5)
ALP SERPL-CCNC: 51 U/L (ref 46–116)
ALT SERPL W P-5'-P-CCNC: 49 U/L (ref 12–78)
ANION GAP SERPL CALCULATED.3IONS-SCNC: 6 MMOL/L (ref 4–13)
AST SERPL W P-5'-P-CCNC: 27 U/L (ref 5–45)
BASOPHILS # BLD AUTO: 0.03 THOUSANDS/ΜL (ref 0–0.1)
BASOPHILS NFR BLD AUTO: 0 % (ref 0–1)
BILIRUB SERPL-MCNC: 0.7 MG/DL (ref 0.2–1)
BUN SERPL-MCNC: 14 MG/DL (ref 5–25)
CALCIUM SERPL-MCNC: 8.8 MG/DL (ref 8.3–10.1)
CHLORIDE SERPL-SCNC: 108 MMOL/L (ref 100–108)
CHOLEST SERPL-MCNC: 147 MG/DL (ref 50–200)
CO2 SERPL-SCNC: 26 MMOL/L (ref 21–32)
CREAT SERPL-MCNC: 0.97 MG/DL (ref 0.6–1.3)
CREAT UR-MCNC: 249 MG/DL
EOSINOPHIL # BLD AUTO: 0.41 THOUSAND/ΜL (ref 0–0.61)
EOSINOPHIL NFR BLD AUTO: 5 % (ref 0–6)
ERYTHROCYTE [DISTWIDTH] IN BLOOD BY AUTOMATED COUNT: 13.7 % (ref 11.6–15.1)
EST. AVERAGE GLUCOSE BLD GHB EST-MCNC: 154 MG/DL
GFR SERPL CREATININE-BSD FRML MDRD: 82 ML/MIN/1.73SQ M
GLUCOSE P FAST SERPL-MCNC: 154 MG/DL (ref 65–99)
HBA1C MFR BLD: 7 % (ref 4.2–6.3)
HCT VFR BLD AUTO: 46.5 % (ref 36.5–49.3)
HDLC SERPL-MCNC: 61 MG/DL (ref 40–60)
HGB BLD-MCNC: 15.4 G/DL (ref 12–17)
LDLC SERPL CALC-MCNC: 62 MG/DL (ref 0–100)
LYMPHOCYTES # BLD AUTO: 2.53 THOUSANDS/ΜL (ref 0.6–4.47)
LYMPHOCYTES NFR BLD AUTO: 31 % (ref 14–44)
MCH RBC QN AUTO: 30.3 PG (ref 26.8–34.3)
MCHC RBC AUTO-ENTMCNC: 33.1 G/DL (ref 31.4–37.4)
MCV RBC AUTO: 92 FL (ref 82–98)
MICROALBUMIN UR-MCNC: 30.1 MG/L (ref 0–20)
MICROALBUMIN/CREAT 24H UR: 12 MG/G CREATININE (ref 0–30)
MONOCYTES # BLD AUTO: 0.71 THOUSAND/ΜL (ref 0.17–1.22)
MONOCYTES NFR BLD AUTO: 9 % (ref 4–12)
NEUTROPHILS # BLD AUTO: 4.38 THOUSANDS/ΜL (ref 1.85–7.62)
NEUTS SEG NFR BLD AUTO: 55 % (ref 43–75)
NONHDLC SERPL-MCNC: 86 MG/DL
NRBC BLD AUTO-RTO: 0 /100 WBCS
PLATELET # BLD AUTO: 233 THOUSANDS/UL (ref 149–390)
PMV BLD AUTO: 10.2 FL (ref 8.9–12.7)
POTASSIUM SERPL-SCNC: 4.3 MMOL/L (ref 3.5–5.3)
PROT SERPL-MCNC: 7.2 G/DL (ref 6.4–8.2)
RBC # BLD AUTO: 5.08 MILLION/UL (ref 3.88–5.62)
SODIUM SERPL-SCNC: 140 MMOL/L (ref 136–145)
TRIGL SERPL-MCNC: 120 MG/DL
TSH SERPL DL<=0.05 MIU/L-ACNC: 1.55 UIU/ML (ref 0.36–3.74)
WBC # BLD AUTO: 8.08 THOUSAND/UL (ref 4.31–10.16)

## 2018-04-07 PROCEDURE — 82306 VITAMIN D 25 HYDROXY: CPT

## 2018-04-07 PROCEDURE — 80061 LIPID PANEL: CPT

## 2018-04-07 PROCEDURE — 80053 COMPREHEN METABOLIC PANEL: CPT

## 2018-04-07 PROCEDURE — 82043 UR ALBUMIN QUANTITATIVE: CPT

## 2018-04-07 PROCEDURE — 85025 COMPLETE CBC W/AUTO DIFF WBC: CPT

## 2018-04-07 PROCEDURE — 3061F NEG MICROALBUMINURIA REV: CPT | Performed by: INTERNAL MEDICINE

## 2018-04-07 PROCEDURE — 84443 ASSAY THYROID STIM HORMONE: CPT

## 2018-04-07 PROCEDURE — 36415 COLL VENOUS BLD VENIPUNCTURE: CPT

## 2018-04-07 PROCEDURE — 82570 ASSAY OF URINE CREATININE: CPT

## 2018-04-07 PROCEDURE — 83036 HEMOGLOBIN GLYCOSYLATED A1C: CPT

## 2018-04-08 ENCOUNTER — APPOINTMENT (OUTPATIENT)
Dept: LAB | Age: 68
End: 2018-04-08
Payer: COMMERCIAL

## 2018-04-08 DIAGNOSIS — Z12.11 ENCOUNTER FOR SCREENING FOR MALIGNANT NEOPLASM OF COLON: ICD-10-CM

## 2018-04-08 DIAGNOSIS — E11.65 TYPE 2 DIABETES MELLITUS WITH HYPERGLYCEMIA (HCC): ICD-10-CM

## 2018-04-08 LAB — HEMOCCULT STL QL IA: NEGATIVE

## 2018-04-08 PROCEDURE — G0328 FECAL BLOOD SCRN IMMUNOASSAY: HCPCS

## 2018-04-16 ENCOUNTER — OFFICE VISIT (OUTPATIENT)
Dept: INTERNAL MEDICINE CLINIC | Facility: CLINIC | Age: 68
End: 2018-04-16
Payer: COMMERCIAL

## 2018-04-16 VITALS
DIASTOLIC BLOOD PRESSURE: 68 MMHG | OXYGEN SATURATION: 98 % | TEMPERATURE: 97.9 F | HEART RATE: 86 BPM | HEIGHT: 69 IN | WEIGHT: 199 LBS | SYSTOLIC BLOOD PRESSURE: 140 MMHG | BODY MASS INDEX: 29.47 KG/M2

## 2018-04-16 DIAGNOSIS — Z12.5 PROSTATE CANCER SCREENING: ICD-10-CM

## 2018-04-16 DIAGNOSIS — E78.01 FAMILIAL HYPERCHOLESTEROLEMIA: ICD-10-CM

## 2018-04-16 DIAGNOSIS — E11.9 TYPE 2 DIABETES MELLITUS WITHOUT COMPLICATION, WITHOUT LONG-TERM CURRENT USE OF INSULIN (HCC): ICD-10-CM

## 2018-04-16 DIAGNOSIS — E55.9 VITAMIN D DEFICIENCY: ICD-10-CM

## 2018-04-16 DIAGNOSIS — E11.65 TYPE 2 DIABETES MELLITUS WITH HYPERGLYCEMIA, WITHOUT LONG-TERM CURRENT USE OF INSULIN (HCC): ICD-10-CM

## 2018-04-16 DIAGNOSIS — I10 ESSENTIAL HYPERTENSION: Primary | ICD-10-CM

## 2018-04-16 PROCEDURE — 99214 OFFICE O/P EST MOD 30 MIN: CPT | Performed by: INTERNAL MEDICINE

## 2018-04-16 PROCEDURE — 1101F PT FALLS ASSESS-DOCD LE1/YR: CPT | Performed by: INTERNAL MEDICINE

## 2018-04-16 RX ORDER — LISINOPRIL 10 MG/1
1 TABLET ORAL DAILY
COMMUNITY
Start: 2018-01-22 | End: 2018-05-03 | Stop reason: SDUPTHER

## 2018-04-16 RX ORDER — METFORMIN HYDROCHLORIDE 500 MG/1
1 TABLET, EXTENDED RELEASE ORAL DAILY
COMMUNITY
Start: 2016-10-24 | End: 2018-04-30 | Stop reason: SDUPTHER

## 2018-04-16 RX ORDER — ERGOCALCIFEROL 1.25 MG/1
CAPSULE ORAL
COMMUNITY
Start: 2011-07-13

## 2018-04-16 NOTE — ASSESSMENT & PLAN NOTE
Diabetes mellitus type 2  Patient did have labs performed prior to the visit today and were happy to report that his sugars still under good control with a hemoglobin A1c of 7 0  We again discussed with the patient the importance of watching his intake of concentrated sweets and simple carbohydrates in order to maintain good control of his diabetes    He will have this checked again with his next visit

## 2018-04-16 NOTE — PROGRESS NOTES
Diabetic Foot Exam    Patient's shoes and socks removed  Right Foot/Ankle   Right Foot Inspection  Skin Exam: skin normal and skin intact no dry skin, no warmth, no callus, no erythema, no maceration, no abnormal color, no pre-ulcer, no ulcer and no callus                          Toe Exam: no swelling, no tenderness, erythema and  no right toe deformity  Sensory   Vibration: intact  Proprioception: intact   Monofilament testing: intact      Left Foot/Ankle  Left Foot Inspection  Skin Exam: skin normal and skin intactno dry skin, no warmth, no erythema, no maceration, normal color, no pre-ulcer, no ulcer and no callus                         Toe Exam: no tenderness and no left toe deformity                   Sensory   Vibration: intact  Proprioception: intact  Monofilament: intact    Assign Risk Category:  No deformity present; No loss of protective sensation;  No weak pulses       Risk: 0

## 2018-04-16 NOTE — PROGRESS NOTES
Assessment/Plan:    Type 2 diabetes mellitus with hyperglycemia (HCC)  Diabetes mellitus type 2  Patient did have labs performed prior to the visit today and were happy to report that his sugars still under good control with a hemoglobin A1c of 7 0  We again discussed with the patient the importance of watching his intake of concentrated sweets and simple carbohydrates in order to maintain good control of his diabetes  He will have this checked again with his next visit    Hypertension  Hypertension  Patient is doing well on present medication and he has had no ill effects from present treatment  Patient's blood pressure is mildly elevated today but overall we have seen it and improvement in his blood pressure  Patient will continue present medication and this will be checked again with his next visit    Hyperlipidemia  Hyperlipidemia  Patient remains on Lipitor 10 milligrams daily  I again reinforced to the patient the importance of watching his intake of fats and cholesterol with his diet  States that when he is away at work it is sometimes difficult but he will try to comply  He was given a slip to check on a lipid profile when he returns to the office in 4 months       Diagnoses and all orders for this visit:    Essential hypertension  -     CBC and differential; Future  -     Comprehensive metabolic panel; Future  -     Lipid panel; Future  -     TSH, 3rd generation with T4 reflex; Future    Familial hypercholesterolemia  -     CBC and differential; Future  -     Comprehensive metabolic panel; Future  -     Lipid panel; Future  -     TSH, 3rd generation with T4 reflex; Future    Type 2 diabetes mellitus without complication, without long-term current use of insulin (HCC)  -     Lipid panel; Future  -     TSH, 3rd generation with T4 reflex; Future  -     HEMOGLOBIN A1C W/ EAG ESTIMATION;  Future  -     Microalbumin / creatinine urine ratio    Prostate cancer screening  -     PSA Total, Diagnostic; Future    Type 2 diabetes mellitus with hyperglycemia, without long-term current use of insulin ([facility])    Vitamin D deficiency  -     Vitamin D 25 hydroxy; Future    Other orders  -     ergocalciferol (VITAMIN D2) 50,000 units; Take by mouth  -     metFORMIN (GLUCOPHAGE-XR) 500 mg 24 hr tablet; Take 1 tablet by mouth daily  -     lisinopril (ZESTRIL) 10 mg tablet; Take 1 tablet by mouth daily          Subjective:      Patient ID: Brenna Shone is a 74 y o  male  Patient is a 72-year-old male with a history of hypertension, hyperlipidemia, diabetes mellitus type 2  Patient is here today for routine follow-up  Patient states he is feeling well with no new complaints or concerns        The following portions of the patient's history were reviewed and updated as appropriate:   He  has a past medical history of Hyperlipidemia  He   Patient Active Problem List    Diagnosis Date Noted    Hypertension 10/21/2016    Vitamin D deficiency 03/03/2015    Type 2 diabetes mellitus with hyperglycemia ([facility] [address] ) 11/20/2013    Hyperlipidemia 09/18/2012     He  has a past surgical history that includes Appendectomy  His family history includes Diabetes in his father; Pancreatic cancer in his father; Parkinsonism in his mother  He  reports that he has quit smoking  He does not have any smokeless tobacco history on file  He reports that he drinks alcohol  His drug history is not on file    Current Outpatient Prescriptions   Medication Sig Dispense Refill    atorvastatin (LIPITOR) 10 mg tablet Take 1 tablet (10 mg total) by mouth daily 90 tablet 2    ergocalciferol (VITAMIN D2) 50,000 units Take by mouth      lisinopril (ZESTRIL) 10 mg tablet Take 1 tablet by mouth daily      metFORMIN (GLUCOPHAGE-XR) 500 mg 24 hr tablet Take 1 tablet by mouth daily       Current Facility-Administered Medications   Medication Dose Route Frequency Provider Last Rate Last Dose    amoxicillin (AMOXIL) capsule 500 mg  500 mg Oral [facility]         Current Outpatient Prescriptions on File Prior to Visit   Medication Sig    atorvastatin (LIPITOR) 10 mg tablet Take 1 tablet (10 mg total) by mouth daily     Current Facility-Administered Medications on File Prior to Visit   Medication    amoxicillin (AMOXIL) capsule 500 mg     He has No Known Allergies       Review of Systems   Constitutional: Negative for activity change, appetite change, chills, diaphoresis, fatigue, fever and unexpected weight change  HENT: Negative for congestion, dental problem, drooling, ear discharge, ear pain, facial swelling, hearing loss, mouth sores, nosebleeds, postnasal drip, rhinorrhea, sinus pain, sinus pressure, sneezing, sore throat, tinnitus, trouble swallowing and voice change  Eyes: Negative for photophobia, pain, discharge, redness, itching and visual disturbance  Patient was again instructed about the importance of yearly eye exams with his ophthalmologist   Respiratory: Negative for apnea, cough, choking, chest tightness, shortness of breath, wheezing and stridor  Cardiovascular: Negative for chest pain, palpitations and leg swelling  Gastrointestinal: Negative for abdominal distention, abdominal pain, anal bleeding, blood in stool, constipation, diarrhea, nausea, rectal pain and vomiting  Endocrine: Negative for cold intolerance, heat intolerance, polydipsia, polyphagia and polyuria  Genitourinary: Negative for decreased urine volume, difficulty urinating, discharge, dysuria, enuresis, flank pain, frequency, genital sores, hematuria, penile pain, penile swelling, scrotal swelling, testicular pain and urgency  Musculoskeletal: Negative for arthralgias, back pain, gait problem, joint swelling, myalgias, neck pain and neck stiffness  Skin: Negative for color change, pallor, rash and wound  Allergic/Immunologic: Negative for environmental allergies, food allergies and immunocompromised state     Neurological: Negative for dizziness, tremors, seizures, syncope, facial asymmetry, speech difficulty, weakness, light-headedness, numbness and headaches  Hematological: Negative for adenopathy  Does not bruise/bleed easily  Psychiatric/Behavioral: Negative for agitation, behavioral problems, confusion, decreased concentration, dysphoric mood, hallucinations, self-injury, sleep disturbance and suicidal ideas  The patient is not nervous/anxious and is not hyperactive  Objective:      /68   Pulse 86   Temp 97 9 °F (36 6 °C)   Ht 5' 9" (1 753 m)   Wt 90 3 kg (199 lb)   SpO2 98%   BMI 29 39 kg/m²          Physical Exam   Constitutional: He is oriented to person, place, and time  He appears well-developed and well-nourished  No distress  Pleasant, cheerful, healthy-appearing 53-year-old male who is awake alert no acute distress   HENT:   Head: Normocephalic and atraumatic  Right Ear: External ear normal    Left Ear: External ear normal    Nose: Nose normal    Mouth/Throat: Oropharynx is clear and moist  No oropharyngeal exudate  Eyes: Conjunctivae and EOM are normal  Pupils are equal, round, and reactive to light  Right eye exhibits no discharge  Left eye exhibits no discharge  No scleral icterus  Neck: Normal range of motion  Neck supple  No JVD present  No tracheal deviation present  No thyromegaly present  Cardiovascular: Normal rate, regular rhythm, normal heart sounds and intact distal pulses  Exam reveals no gallop and no friction rub  No murmur heard  Pulmonary/Chest: Effort normal and breath sounds normal  No stridor  No respiratory distress  He has no wheezes  He has no rales  He exhibits no tenderness  Abdominal: Soft  Bowel sounds are normal  He exhibits no distension and no mass  There is no tenderness  There is no rebound and no guarding  Musculoskeletal: Normal range of motion  He exhibits no edema, tenderness or deformity  Lymphadenopathy:     He has no cervical adenopathy  Neurological: He is alert and oriented to person, place, and time  He has normal reflexes  He displays normal reflexes  No cranial nerve deficit  He exhibits normal muscle tone  Coordination normal    Skin: Skin is warm and dry  No rash noted  He is not diaphoretic  No erythema  No pallor  Psychiatric: He has a normal mood and affect  His behavior is normal  Judgment and thought content normal    Nursing note and vitals reviewed

## 2018-04-16 NOTE — ASSESSMENT & PLAN NOTE
Hyperlipidemia  Patient remains on Lipitor 10 milligrams daily  I again reinforced to the patient the importance of watching his intake of fats and cholesterol with his diet  States that when he is away at work it is sometimes difficult but he will try to comply    He was given a slip to check on a lipid profile when he returns to the office in 4 months

## 2018-04-16 NOTE — ASSESSMENT & PLAN NOTE
Vitamin-D deficiency  Patient is taking his vitamin-D as prescribed  Patient was given a slip to check on a vitamin-D level when he returns in 4 months    We will modify treatment if necessary

## 2018-04-16 NOTE — ASSESSMENT & PLAN NOTE
Hypertension  Patient is doing well on present medication and he has had no ill effects from present treatment  Patient's blood pressure is mildly elevated today but overall we have seen it and improvement in his blood pressure    Patient will continue present medication and this will be checked again with his next visit

## 2018-04-30 DIAGNOSIS — E11.9 TYPE 2 DIABETES MELLITUS WITHOUT COMPLICATION, WITHOUT LONG-TERM CURRENT USE OF INSULIN (HCC): Primary | ICD-10-CM

## 2018-04-30 RX ORDER — METFORMIN HYDROCHLORIDE 500 MG/1
TABLET, EXTENDED RELEASE ORAL
Qty: 180 TABLET | Refills: 1 | Status: SHIPPED | OUTPATIENT
Start: 2018-04-30 | End: 2019-08-09 | Stop reason: SDUPTHER

## 2018-05-03 DIAGNOSIS — I10 ESSENTIAL HYPERTENSION: Primary | ICD-10-CM

## 2018-05-03 RX ORDER — LISINOPRIL 10 MG/1
10 TABLET ORAL DAILY
Qty: 30 TABLET | Refills: 1 | Status: SHIPPED | OUTPATIENT
Start: 2018-05-03 | End: 2018-05-07 | Stop reason: SDUPTHER

## 2018-05-07 DIAGNOSIS — I10 ESSENTIAL HYPERTENSION: ICD-10-CM

## 2018-05-07 RX ORDER — LISINOPRIL 10 MG/1
10 TABLET ORAL DAILY
Qty: 90 TABLET | Refills: 3 | Status: SHIPPED | OUTPATIENT
Start: 2018-05-07 | End: 2018-06-04 | Stop reason: SDUPTHER

## 2018-06-04 DIAGNOSIS — I10 ESSENTIAL HYPERTENSION: ICD-10-CM

## 2018-06-04 DIAGNOSIS — E78.00 PURE HYPERCHOLESTEROLEMIA: ICD-10-CM

## 2018-06-04 RX ORDER — ATORVASTATIN CALCIUM 10 MG/1
10 TABLET, FILM COATED ORAL DAILY
Qty: 90 TABLET | Refills: 3 | Status: SHIPPED | OUTPATIENT
Start: 2018-06-04 | End: 2018-06-19 | Stop reason: SDUPTHER

## 2018-06-04 RX ORDER — LISINOPRIL 10 MG/1
10 TABLET ORAL DAILY
Qty: 90 TABLET | Refills: 0 | Status: SHIPPED | OUTPATIENT
Start: 2018-06-04 | End: 2018-07-31

## 2018-06-19 DIAGNOSIS — E78.00 PURE HYPERCHOLESTEROLEMIA: ICD-10-CM

## 2018-06-19 RX ORDER — ATORVASTATIN CALCIUM 10 MG/1
10 TABLET, FILM COATED ORAL DAILY
Qty: 90 TABLET | Refills: 3 | Status: SHIPPED | OUTPATIENT
Start: 2018-06-19 | End: 2018-09-07 | Stop reason: SDUPTHER

## 2018-07-31 ENCOUNTER — TELEPHONE (OUTPATIENT)
Dept: INTERNAL MEDICINE CLINIC | Facility: CLINIC | Age: 68
End: 2018-07-31

## 2018-07-31 DIAGNOSIS — I10 ESSENTIAL HYPERTENSION: Primary | ICD-10-CM

## 2018-07-31 RX ORDER — LOSARTAN POTASSIUM 50 MG/1
50 TABLET ORAL DAILY
Qty: 30 TABLET | Refills: 4 | Status: SHIPPED | OUTPATIENT
Start: 2018-07-31 | End: 2018-09-04 | Stop reason: SDUPTHER

## 2018-07-31 NOTE — PROGRESS NOTES
COUGH FROM LISINOPRIL  WILL STOP THE MEDICATION AND PLACE THE PATIENT ON COZAAR  50 MG DAILY    PRESCRIPTION WAS SENT TO THE PHARMACY

## 2018-07-31 NOTE — TELEPHONE ENCOUNTER
Patient's blood pressure med is making him cough really bad every time he takes it, he has not been taking it for the past two day, would you please call him to discuss this

## 2018-08-13 ENCOUNTER — TRANSCRIBE ORDERS (OUTPATIENT)
Dept: ADMINISTRATIVE | Age: 68
End: 2018-08-13

## 2018-08-13 ENCOUNTER — APPOINTMENT (OUTPATIENT)
Dept: LAB | Age: 68
End: 2018-08-13
Payer: COMMERCIAL

## 2018-08-13 DIAGNOSIS — E55.9 VITAMIN D DEFICIENCY: ICD-10-CM

## 2018-08-13 DIAGNOSIS — I10 ESSENTIAL HYPERTENSION: ICD-10-CM

## 2018-08-13 DIAGNOSIS — E78.01 FAMILIAL HYPERCHOLESTEROLEMIA: ICD-10-CM

## 2018-08-13 DIAGNOSIS — Z12.5 PROSTATE CANCER SCREENING: ICD-10-CM

## 2018-08-13 DIAGNOSIS — E11.9 TYPE 2 DIABETES MELLITUS WITHOUT COMPLICATION, WITHOUT LONG-TERM CURRENT USE OF INSULIN (HCC): ICD-10-CM

## 2018-08-13 LAB
25(OH)D3 SERPL-MCNC: 45.6 NG/ML (ref 30–100)
ALBUMIN SERPL BCP-MCNC: 3.7 G/DL (ref 3.5–5)
ALP SERPL-CCNC: 51 U/L (ref 46–116)
ALT SERPL W P-5'-P-CCNC: 53 U/L (ref 12–78)
ANION GAP SERPL CALCULATED.3IONS-SCNC: 7 MMOL/L (ref 4–13)
AST SERPL W P-5'-P-CCNC: 34 U/L (ref 5–45)
BASOPHILS # BLD AUTO: 0.08 THOUSANDS/ΜL (ref 0–0.1)
BASOPHILS NFR BLD AUTO: 1 % (ref 0–1)
BILIRUB SERPL-MCNC: 0.54 MG/DL (ref 0.2–1)
BUN SERPL-MCNC: 17 MG/DL (ref 5–25)
CALCIUM SERPL-MCNC: 8.7 MG/DL (ref 8.3–10.1)
CHLORIDE SERPL-SCNC: 108 MMOL/L (ref 100–108)
CHOLEST SERPL-MCNC: 140 MG/DL (ref 50–200)
CO2 SERPL-SCNC: 25 MMOL/L (ref 21–32)
CREAT SERPL-MCNC: 0.91 MG/DL (ref 0.6–1.3)
CREAT UR-MCNC: 152 MG/DL
EOSINOPHIL # BLD AUTO: 1.15 THOUSAND/ΜL (ref 0–0.61)
EOSINOPHIL NFR BLD AUTO: 15 % (ref 0–6)
ERYTHROCYTE [DISTWIDTH] IN BLOOD BY AUTOMATED COUNT: 13.4 % (ref 11.6–15.1)
EST. AVERAGE GLUCOSE BLD GHB EST-MCNC: 146 MG/DL
GFR SERPL CREATININE-BSD FRML MDRD: 88 ML/MIN/1.73SQ M
GLUCOSE P FAST SERPL-MCNC: 112 MG/DL (ref 65–99)
HBA1C MFR BLD: 6.7 % (ref 4.2–6.3)
HCT VFR BLD AUTO: 44 % (ref 36.5–49.3)
HDLC SERPL-MCNC: 56 MG/DL (ref 40–60)
HGB BLD-MCNC: 14.5 G/DL (ref 12–17)
IMM GRANULOCYTES # BLD AUTO: 0.02 THOUSAND/UL (ref 0–0.2)
IMM GRANULOCYTES NFR BLD AUTO: 0 % (ref 0–2)
LDLC SERPL CALC-MCNC: 68 MG/DL (ref 0–100)
LYMPHOCYTES # BLD AUTO: 2.26 THOUSANDS/ΜL (ref 0.6–4.47)
LYMPHOCYTES NFR BLD AUTO: 29 % (ref 14–44)
MCH RBC QN AUTO: 30.3 PG (ref 26.8–34.3)
MCHC RBC AUTO-ENTMCNC: 33 G/DL (ref 31.4–37.4)
MCV RBC AUTO: 92 FL (ref 82–98)
MICROALBUMIN UR-MCNC: 10.7 MG/L (ref 0–20)
MICROALBUMIN/CREAT 24H UR: 7 MG/G CREATININE (ref 0–30)
MONOCYTES # BLD AUTO: 0.53 THOUSAND/ΜL (ref 0.17–1.22)
MONOCYTES NFR BLD AUTO: 7 % (ref 4–12)
NEUTROPHILS # BLD AUTO: 3.71 THOUSANDS/ΜL (ref 1.85–7.62)
NEUTS SEG NFR BLD AUTO: 48 % (ref 43–75)
NONHDLC SERPL-MCNC: 84 MG/DL
NRBC BLD AUTO-RTO: 0 /100 WBCS
PLATELET # BLD AUTO: 219 THOUSANDS/UL (ref 149–390)
PMV BLD AUTO: 10.2 FL (ref 8.9–12.7)
POTASSIUM SERPL-SCNC: 4.2 MMOL/L (ref 3.5–5.3)
PROT SERPL-MCNC: 6.9 G/DL (ref 6.4–8.2)
PSA SERPL-MCNC: 1.3 NG/ML (ref 0–4)
RBC # BLD AUTO: 4.78 MILLION/UL (ref 3.88–5.62)
SODIUM SERPL-SCNC: 140 MMOL/L (ref 136–145)
TRIGL SERPL-MCNC: 78 MG/DL
TSH SERPL DL<=0.05 MIU/L-ACNC: 1.39 UIU/ML (ref 0.36–3.74)
WBC # BLD AUTO: 7.75 THOUSAND/UL (ref 4.31–10.16)

## 2018-08-13 PROCEDURE — 80053 COMPREHEN METABOLIC PANEL: CPT

## 2018-08-13 PROCEDURE — 84153 ASSAY OF PSA TOTAL: CPT

## 2018-08-13 PROCEDURE — 85025 COMPLETE CBC W/AUTO DIFF WBC: CPT

## 2018-08-13 PROCEDURE — 36415 COLL VENOUS BLD VENIPUNCTURE: CPT

## 2018-08-13 PROCEDURE — 3061F NEG MICROALBUMINURIA REV: CPT | Performed by: INTERNAL MEDICINE

## 2018-08-13 PROCEDURE — 83036 HEMOGLOBIN GLYCOSYLATED A1C: CPT

## 2018-08-13 PROCEDURE — 80061 LIPID PANEL: CPT

## 2018-08-13 PROCEDURE — 82043 UR ALBUMIN QUANTITATIVE: CPT | Performed by: INTERNAL MEDICINE

## 2018-08-13 PROCEDURE — 84443 ASSAY THYROID STIM HORMONE: CPT

## 2018-08-13 PROCEDURE — 82306 VITAMIN D 25 HYDROXY: CPT

## 2018-08-13 PROCEDURE — 82570 ASSAY OF URINE CREATININE: CPT | Performed by: INTERNAL MEDICINE

## 2018-09-04 DIAGNOSIS — I10 ESSENTIAL HYPERTENSION: ICD-10-CM

## 2018-09-04 PROCEDURE — 4010F ACE/ARB THERAPY RXD/TAKEN: CPT | Performed by: INTERNAL MEDICINE

## 2018-09-04 RX ORDER — LOSARTAN POTASSIUM 50 MG/1
50 TABLET ORAL DAILY
Qty: 90 TABLET | Refills: 3 | Status: SHIPPED | OUTPATIENT
Start: 2018-09-04 | End: 2018-11-16 | Stop reason: SDUPTHER

## 2018-09-07 ENCOUNTER — OFFICE VISIT (OUTPATIENT)
Dept: INTERNAL MEDICINE CLINIC | Facility: CLINIC | Age: 68
End: 2018-09-07
Payer: COMMERCIAL

## 2018-09-07 VITALS
BODY MASS INDEX: 28.73 KG/M2 | DIASTOLIC BLOOD PRESSURE: 84 MMHG | HEART RATE: 85 BPM | OXYGEN SATURATION: 97 % | TEMPERATURE: 97.7 F | SYSTOLIC BLOOD PRESSURE: 152 MMHG | WEIGHT: 194 LBS | HEIGHT: 69 IN

## 2018-09-07 DIAGNOSIS — E78.00 PURE HYPERCHOLESTEROLEMIA: ICD-10-CM

## 2018-09-07 DIAGNOSIS — E78.01 FAMILIAL HYPERCHOLESTEROLEMIA: ICD-10-CM

## 2018-09-07 DIAGNOSIS — Z00.00 HEALTHCARE MAINTENANCE: ICD-10-CM

## 2018-09-07 DIAGNOSIS — E55.9 VITAMIN D DEFICIENCY: ICD-10-CM

## 2018-09-07 DIAGNOSIS — I10 ESSENTIAL HYPERTENSION: ICD-10-CM

## 2018-09-07 DIAGNOSIS — E11.65 TYPE 2 DIABETES MELLITUS WITH HYPERGLYCEMIA, WITHOUT LONG-TERM CURRENT USE OF INSULIN (HCC): Primary | ICD-10-CM

## 2018-09-07 PROCEDURE — 1160F RVW MEDS BY RX/DR IN RCRD: CPT | Performed by: INTERNAL MEDICINE

## 2018-09-07 PROCEDURE — 99397 PER PM REEVAL EST PAT 65+ YR: CPT | Performed by: INTERNAL MEDICINE

## 2018-09-07 RX ORDER — ATORVASTATIN CALCIUM 10 MG/1
10 TABLET, FILM COATED ORAL DAILY
Qty: 90 TABLET | Refills: 3 | Status: SHIPPED | OUTPATIENT
Start: 2018-09-07 | End: 2019-04-08 | Stop reason: SDUPTHER

## 2018-09-07 NOTE — ASSESSMENT & PLAN NOTE
Patient has had an excellent response to treatment for his hyperlipidemia specifically Lipitor 10 mg daily  He was told with him having diabetes alone he has 4 times average risk for developing heart disease and is still very important for him to watch his diet and reduce his intake of fats and cholesterol    Patient understands and agrees and he will continue the Lipitor as prescribed

## 2018-09-07 NOTE — ASSESSMENT & PLAN NOTE
Patient is a 44-year-old male with a history of diabetes mellitus type 2, hyperlipidemia  Patient is here today for a general physical   Patient states that his blood pressure is mildly elevated because he forgot to take his medicine for 3 days  In general patient states he is doing well and we did discuss all the results of all the blood testing with him with sugar and cholesterol under control as well as his kidney function  He is denying any chest pain or pressure and no increasing shortness of breath with exertion  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    Patient is up-to-date with routine colonoscopy

## 2018-09-07 NOTE — ASSESSMENT & PLAN NOTE
Patient was on a high dose of vitamin D and now is taking 2000 international units daily  Patient's vitamin-D level is now normal ionized and patient will continue with present treatment

## 2018-09-07 NOTE — PROGRESS NOTES
Assessment/Plan:    Type 2 diabetes mellitus with hyperglycemia (HCC)  Lab Results   Component Value Date    HGBA1C 6 7 (H) 08/13/2018       No results for input(s): POCGLU in the last 72 hours  Blood Sugar Average: Last 72 hrs:   as noted patient's blood sugar showing adequate control with a hemoglobin A1c of 6 7  Patient states he is not always perfectly compliant with the medication  He is trying to watch his diet closely and to eliminate concentrated sweets and simple carbohydrates from his diet  He is getting no regular exercise  Diabetic foot exam was performed today showing no abnormalities  Patient does see his ophthalmologist on a yearly basis for diabetic eye care  Patient was given a slip to check on a fasting blood sugar, hemoglobin A1c when he returns to the office in 4 months    Healthcare maintenance  Patient is a 72-year-old male with a history of diabetes mellitus type 2, hyperlipidemia  Patient is here today for a general physical   Patient states that his blood pressure is mildly elevated because he forgot to take his medicine for 3 days  In general patient states he is doing well and we did discuss all the results of all the blood testing with him with sugar and cholesterol under control as well as his kidney function  He is denying any chest pain or pressure and no increasing shortness of breath with exertion  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  Patient is up-to-date with routine colonoscopy    Vitamin D deficiency  Patient was on a high dose of vitamin D and now is taking 2000 international units daily  Patient's vitamin-D level is now normal ionized and patient will continue with present treatment  Hyperlipidemia  Patient has had an excellent response to treatment for his hyperlipidemia specifically Lipitor 10 mg daily    He was told with him having diabetes alone he has 4 times average risk for developing heart disease and is still very important for him to watch his diet and reduce his intake of fats and cholesterol  Patient understands and agrees and he will continue the Lipitor as prescribed    Hypertension  As noted patient's blood pressure is moderately elevated today with presentation  Patient states that he had for gotten to take his Cozaar for at least the last 3-4 days and this could be because of his elevated blood pressure readings  We reinforced to the patient the importance of taking this medication on a regular basis and the importance with his other medical problems including hyperlipidemia and diabetes to keep his blood pressure under control  Diagnoses and all orders for this visit:    Type 2 diabetes mellitus with hyperglycemia, without long-term current use of insulin ([facility])  -     Basic metabolic panel; Future  -     Hemoglobin A1C; Future    Essential hypertension    Familial hypercholesterolemia    Vitamin D deficiency    Pure hypercholesterolemia  -     atorvastatin (LIPITOR) 10 mg tablet; Take 1 tablet (10 mg total) by mouth daily    Healthcare maintenance          Subjective:      Patient ID: Lora Luna is a 74 y o  male  Patient is 43-year-old male with a history of hypertension, hyperlipidemia, diabetes mellitus type 2  Patient is here today for routine physical   Patient states he has been feeling well and he has no new medical complaints or concerns  Patient had recent blood testing performed and were happy to tell patient that along with his sugar being under control of his cholesterol and renal function  Patient also had an vitamin-D tested and again this is normal   In general patient states he is doing well and has no new complaints or problems  The following portions of the patient's history were reviewed and updated as appropriate:   He  has a past medical history of Hyperlipidemia    He   Patient Active Problem List    Diagnosis Date Noted    Healthcare maintenance 09/07/2018    Hypertension 10/21/2016    Vitamin D deficiency 03/03/2015    Type 2 diabetes mellitus with hyperglycemia ([facility] [address] ) 11/20/2013    Hyperlipidemia 09/18/2012     He  has a past surgical history that includes Appendectomy  His family history includes Diabetes in his father; Pancreatic cancer in his father; Parkinsonism in his mother  He  reports that he has quit smoking  He has never used smokeless tobacco  He reports that he drinks alcohol  His drug history is not on file  Current Outpatient Prescriptions   Medication Sig Dispense Refill    atorvastatin (LIPITOR) 10 mg tablet Take 1 tablet (10 mg total) by mouth daily 90 tablet 3    ergocalciferol (VITAMIN D2) 50,000 units Take by mouth      losartan (COZAAR) 50 mg tablet Take 1 tablet (50 mg total) by mouth daily 90 tablet 3    metFORMIN (GLUCOPHAGE-XR) 500 mg 24 hr tablet TAKE 1 TABLET TWICE A DAY WITH FOOD 180 tablet 1     Current Facility-Administered Medications   Medication Dose Route Frequency Provider Last Rate Last Dose    amoxicillin (AMOXIL) capsule 500 mg  500 mg Oral Q[facility]         Current Outpatient Prescriptions on File Prior to Visit   Medication Sig    ergocalciferol (VITAMIN D2) 50,000 units Take by mouth    losartan (COZAAR) 50 mg tablet Take 1 tablet (50 mg total) by mouth daily    metFORMIN (GLUCOPHAGE-XR) 500 mg 24 hr tablet TAKE 1 TABLET TWICE A DAY WITH FOOD    [DISCONTINUED] atorvastatin (LIPITOR) 10 mg tablet Take 1 tablet (10 mg total) by mouth daily     Current Facility-Administered Medications on File Prior to Visit   Medication    amoxicillin (AMOXIL) capsule 500 mg     He has No Known Allergies       Review of Systems   Constitutional: Negative  HENT: Negative  Eyes: Negative  Respiratory: Negative  Endocrine: Negative  Genitourinary: Negative  Musculoskeletal: Negative      Skin: Positive for wound (Patient had abrasion to his index finger on his left hand she states is healing without signs of infection)  Negative for color change, pallor and rash  Allergic/Immunologic: Negative  Neurological: Negative  Hematological: Negative  Psychiatric/Behavioral: Negative  Objective:      /84   Pulse 85   Temp 97 7 °F (36 5 °C)   Ht 5' 9" (1 753 m)   Wt 88 kg (194 lb)   SpO2 97%   BMI 28 65 kg/m²          Physical Exam   Constitutional: He is oriented to person, place, and time  He appears well-developed and well-nourished  No distress  Pleasant, articulate, healthy-appearing 79-year-old male who is awake alert no acute distress and oriented x3   HENT:   Head: Normocephalic and atraumatic  Right Ear: External ear normal    Left Ear: External ear normal    Nose: Nose normal    Mouth/Throat: Oropharynx is clear and moist  No oropharyngeal exudate  Eyes: Conjunctivae and EOM are normal  Pupils are equal, round, and reactive to light  Right eye exhibits no discharge  Left eye exhibits no discharge  No scleral icterus  Neck: Normal range of motion  Neck supple  No JVD present  No tracheal deviation present  No thyromegaly present  Cardiovascular: Normal rate, regular rhythm and intact distal pulses  Exam reveals no gallop and no friction rub  No murmur heard  Pulmonary/Chest: Effort normal and breath sounds normal  No stridor  No respiratory distress  He has no wheezes  He has no rales  He exhibits no tenderness  Abdominal: Soft  Bowel sounds are normal  He exhibits no distension and no mass  There is no tenderness  There is no rebound and no guarding  Genitourinary: Rectum normal, prostate normal and penis normal  No penile tenderness  Musculoskeletal: Normal range of motion  He exhibits no edema, tenderness or deformity  Lymphadenopathy:     He has no cervical adenopathy  Neurological: He is alert and oriented to person, place, and time  He has normal reflexes  He displays normal reflexes  No cranial nerve deficit   He exhibits normal muscle tone  Coordination normal    Skin: Skin is dry  No rash noted  He is not diaphoretic  No erythema  No pallor  Healing abrasion to distal portion of his index finger on the left hand   Psychiatric: He has a normal mood and affect  His behavior is normal  Judgment and thought content normal    Nursing note and vitals reviewed

## 2018-09-07 NOTE — PATIENT INSTRUCTIONS
Thank you for enrolling in [facility]  Please follow the instructions below to securely access your online medical record  [employer] allows you to send messages to your doctor, view your test results, renew your prescriptions, schedule appointments, and more  [facility] uses Single Sign on (SSO) Technology for you to log in and access our [facility], including [employer]  No more remembering multiple user names and passwords! We are going to guide you through, step by step, to help you set up your [employer] account which will provide access to your [employer] account  How Do I Sign Up? 1  In your Internet browser, go to [email or web address]       2  Click on the [facility] patient account and then click Dont have an                 Account? Create one now      3  Enter your demographic information and chose a user name (email address) and password  Think of one that is secure and easy to remember  Enter a Referral code if you have one (this is not your VeedMe[employer] code ) Accept the Terms and Conditions and the Privacy Policy  4  Select your security questions that you will use to reset your password should you forget it  Click Submit  5  Enter your [employer] Activation Code exactly as it appears below  You will not need to use this code after you have completed the sign-up process  If you do not sign up before the expiration date, you must request a new code  [employer] Activation Code: [email or web address]  Expires: 9/21/2018  3:41 PM    6  Confirm your email address  An email confirmation was sent to you  Please open that email and click Confirm your Email   You should then be redirected to our [employer] Single sign on page, where you will log on with the user name and password you created! Proceed to the [employer] Icon to view your personal health information          Additional Information  If you have questions, you can e-mail [email or web address] or call [phone] to talk to our customer support staff  Remember, MyChart is NOT to be used for urgent needs  For medical emergencies, dial 911

## 2018-09-07 NOTE — ASSESSMENT & PLAN NOTE
As noted patient's blood pressure is moderately elevated today with presentation  Patient states that he had for gotten to take his Cozaar for at least the last 3-4 days and this could be because of his elevated blood pressure readings  We reinforced to the patient the importance of taking this medication on a regular basis and the importance with his other medical problems including hyperlipidemia and diabetes to keep his blood pressure under control

## 2018-09-07 NOTE — ASSESSMENT & PLAN NOTE
Lab Results   Component Value Date    HGBA1C 6 7 (H) 08/13/2018       No results for input(s): POCGLU in the last 72 hours  Blood Sugar Average: Last 72 hrs:   as noted patient's blood sugar showing adequate control with a hemoglobin A1c of 6 7  Patient states he is not always perfectly compliant with the medication  He is trying to watch his diet closely and to eliminate concentrated sweets and simple carbohydrates from his diet  He is getting no regular exercise  Diabetic foot exam was performed today showing no abnormalities  Patient does see his ophthalmologist on a yearly basis for diabetic eye care    Patient was given a slip to check on a fasting blood sugar, hemoglobin A1c when he returns to the office in 4 months

## 2018-09-07 NOTE — PROGRESS NOTES
Diabetic Foot Exam    Right Foot/Ankle   Right Foot Inspection  Skin Exam: skin normal and skin intact no dry skin, no warmth, no callus, no erythema, no maceration, no abnormal color, no pre-ulcer, no ulcer and no callus                          Toe Exam: ROM and strength within normal limitsno swelling, no tenderness, erythema and  no right toe deformity  Sensory   Vibration: intact  Proprioception: intact   Monofilament testing: intact  Vascular  Capillary refills: < 3 seconds  The right DP pulse is 2+  The right PT pulse is 2+  Left Foot/Ankle  Left Foot Inspection  Skin Exam: skin normal and skin intactno dry skin, no warmth, no erythema, no maceration, normal color, no pre-ulcer, no ulcer and no callus                         Toe Exam: ROM and strength within normal limitsno swelling, no tenderness, no erythema and no left toe deformity                   Sensory   Vibration: intact  Proprioception: intact  Monofilament: intact  Vascular  Capillary refills: < 3 seconds  The left DP pulse is 2+  The left PT pulse is 2+  Assign Risk Category:  No deformity present; No loss of protective sensation;  No weak pulses       Risk: 0

## 2018-11-16 DIAGNOSIS — I10 ESSENTIAL HYPERTENSION: ICD-10-CM

## 2018-11-16 PROCEDURE — 4010F ACE/ARB THERAPY RXD/TAKEN: CPT | Performed by: INTERNAL MEDICINE

## 2018-11-16 RX ORDER — LOSARTAN POTASSIUM 50 MG/1
50 TABLET ORAL DAILY
Qty: 10 TABLET | Refills: 0 | Status: SHIPPED | OUTPATIENT
Start: 2018-11-16 | End: 2019-02-15 | Stop reason: SDUPTHER

## 2018-12-03 ENCOUNTER — OFFICE VISIT (OUTPATIENT)
Dept: INTERNAL MEDICINE CLINIC | Facility: CLINIC | Age: 68
End: 2018-12-03
Payer: COMMERCIAL

## 2018-12-03 ENCOUNTER — APPOINTMENT (OUTPATIENT)
Dept: LAB | Age: 68
End: 2018-12-03
Payer: COMMERCIAL

## 2018-12-03 VITALS
HEART RATE: 75 BPM | BODY MASS INDEX: 29.62 KG/M2 | WEIGHT: 200 LBS | OXYGEN SATURATION: 98 % | TEMPERATURE: 97.1 F | DIASTOLIC BLOOD PRESSURE: 82 MMHG | HEIGHT: 69 IN | SYSTOLIC BLOOD PRESSURE: 130 MMHG

## 2018-12-03 DIAGNOSIS — E78.01 FAMILIAL HYPERCHOLESTEROLEMIA: ICD-10-CM

## 2018-12-03 DIAGNOSIS — Z00.00 HEALTHCARE MAINTENANCE: ICD-10-CM

## 2018-12-03 DIAGNOSIS — H10.33 ACUTE CONJUNCTIVITIS OF BOTH EYES, UNSPECIFIED ACUTE CONJUNCTIVITIS TYPE: ICD-10-CM

## 2018-12-03 DIAGNOSIS — I10 ESSENTIAL HYPERTENSION: ICD-10-CM

## 2018-12-03 DIAGNOSIS — E55.9 VITAMIN D DEFICIENCY: ICD-10-CM

## 2018-12-03 DIAGNOSIS — E11.65 TYPE 2 DIABETES MELLITUS WITH HYPERGLYCEMIA, WITHOUT LONG-TERM CURRENT USE OF INSULIN (HCC): ICD-10-CM

## 2018-12-03 DIAGNOSIS — E11.65 TYPE 2 DIABETES MELLITUS WITH HYPERGLYCEMIA, WITHOUT LONG-TERM CURRENT USE OF INSULIN (HCC): Primary | ICD-10-CM

## 2018-12-03 PROBLEM — H10.13 ACUTE ATOPIC CONJUNCTIVITIS OF BOTH EYES: Status: ACTIVE | Noted: 2018-12-03

## 2018-12-03 LAB
25(OH)D3 SERPL-MCNC: 40.1 NG/ML (ref 30–100)
ALBUMIN SERPL BCP-MCNC: 4 G/DL (ref 3.5–5)
ALP SERPL-CCNC: 52 U/L (ref 46–116)
ALT SERPL W P-5'-P-CCNC: 69 U/L (ref 12–78)
ANION GAP SERPL CALCULATED.3IONS-SCNC: 6 MMOL/L (ref 4–13)
AST SERPL W P-5'-P-CCNC: 39 U/L (ref 5–45)
BILIRUB SERPL-MCNC: 0.48 MG/DL (ref 0.2–1)
BUN SERPL-MCNC: 10 MG/DL (ref 5–25)
CALCIUM SERPL-MCNC: 8.8 MG/DL (ref 8.3–10.1)
CHLORIDE SERPL-SCNC: 107 MMOL/L (ref 100–108)
CHOLEST SERPL-MCNC: 150 MG/DL (ref 50–200)
CO2 SERPL-SCNC: 28 MMOL/L (ref 21–32)
CREAT SERPL-MCNC: 0.96 MG/DL (ref 0.6–1.3)
EST. AVERAGE GLUCOSE BLD GHB EST-MCNC: 157 MG/DL
GFR SERPL CREATININE-BSD FRML MDRD: 82 ML/MIN/1.73SQ M
GLUCOSE P FAST SERPL-MCNC: 166 MG/DL (ref 65–99)
HBA1C MFR BLD: 7.1 % (ref 4.2–6.3)
HDLC SERPL-MCNC: 59 MG/DL (ref 40–60)
LDLC SERPL CALC-MCNC: 71 MG/DL (ref 0–100)
NONHDLC SERPL-MCNC: 91 MG/DL
POTASSIUM SERPL-SCNC: 4.1 MMOL/L (ref 3.5–5.3)
PROT SERPL-MCNC: 7.1 G/DL (ref 6.4–8.2)
SODIUM SERPL-SCNC: 141 MMOL/L (ref 136–145)
TRIGL SERPL-MCNC: 98 MG/DL
TSH SERPL DL<=0.05 MIU/L-ACNC: 2.06 UIU/ML (ref 0.36–3.74)

## 2018-12-03 PROCEDURE — 84443 ASSAY THYROID STIM HORMONE: CPT

## 2018-12-03 PROCEDURE — 83036 HEMOGLOBIN GLYCOSYLATED A1C: CPT

## 2018-12-03 PROCEDURE — 80053 COMPREHEN METABOLIC PANEL: CPT

## 2018-12-03 PROCEDURE — 82306 VITAMIN D 25 HYDROXY: CPT

## 2018-12-03 PROCEDURE — 36415 COLL VENOUS BLD VENIPUNCTURE: CPT

## 2018-12-03 PROCEDURE — 3075F SYST BP GE 130 - 139MM HG: CPT | Performed by: INTERNAL MEDICINE

## 2018-12-03 PROCEDURE — 1160F RVW MEDS BY RX/DR IN RCRD: CPT | Performed by: INTERNAL MEDICINE

## 2018-12-03 PROCEDURE — 99214 OFFICE O/P EST MOD 30 MIN: CPT | Performed by: INTERNAL MEDICINE

## 2018-12-03 PROCEDURE — 80061 LIPID PANEL: CPT

## 2018-12-03 PROCEDURE — 3079F DIAST BP 80-89 MM HG: CPT | Performed by: INTERNAL MEDICINE

## 2018-12-03 PROCEDURE — 3008F BODY MASS INDEX DOCD: CPT | Performed by: INTERNAL MEDICINE

## 2018-12-03 RX ORDER — ATORVASTATIN CALCIUM 10 MG/1
10 TABLET, FILM COATED ORAL DAILY
Qty: 90 TABLET | Refills: 3 | Status: SHIPPED | OUTPATIENT
Start: 2018-12-03 | End: 2020-01-02 | Stop reason: SDUPTHER

## 2018-12-03 RX ORDER — METFORMIN HYDROCHLORIDE 500 MG/1
500 TABLET, EXTENDED RELEASE ORAL 2 TIMES DAILY WITH MEALS
Qty: 180 TABLET | Refills: 3 | Status: SHIPPED | OUTPATIENT
Start: 2018-12-03 | End: 2020-03-18 | Stop reason: SDUPTHER

## 2018-12-03 RX ORDER — TOBRAMYCIN AND DEXAMETHASONE 3; 1 MG/ML; MG/ML
1 SUSPENSION/ DROPS OPHTHALMIC
Qty: 5 ML | Refills: 2 | Status: SHIPPED | OUTPATIENT
Start: 2018-12-03 | End: 2020-12-14 | Stop reason: SDUPTHER

## 2018-12-03 NOTE — PROGRESS NOTES
Diabetic Foot Exam    Patient's shoes and socks removed  Right Foot/Ankle   Right Foot Inspection  Skin Exam: skin normal and skin intact no dry skin, no warmth, no callus, no erythema, no maceration, no abnormal color, no pre-ulcer, no ulcer and no callus                          Toe Exam: ROM and strength within normal limits  Sensory   Vibration: intact  Proprioception: intact   Monofilament testing: intact  Vascular  Capillary refills: < 3 seconds  The right DP pulse is 2+  The right PT pulse is 2+  Left Foot/Ankle  Left Foot Inspection  Skin Exam: skin normal and skin intactno dry skin, no warmth, no erythema, no maceration, normal color, no pre-ulcer, no ulcer and no callus                         Toe Exam: ROM and strength within normal limits                   Sensory   Vibration: intact  Proprioception: intact  Monofilament: intact  Vascular  Capillary refills: < 3 seconds  The left DP pulse is 2+  Assign Risk Category:  No deformity present; No loss of protective sensation;  No weak pulses       Risk: 0

## 2018-12-03 NOTE — ASSESSMENT & PLAN NOTE
Patient besides hypertension diabetes has a history of hyperlipidemia  He remains on atorvastatin 10 mg daily  Was given a slip to check on a lipid profile with his next visit  Again specifically we gave him instructions as far as reducing fats and cholesterol in his diet  We will modify treatment as necessary

## 2018-12-03 NOTE — PROGRESS NOTES
Assessment/Plan:    Hypertension  As noted patient's blood pressure showing adequate control blood present treatment although his blood pressure can be variable  At this point we will continue with present treatment and make modification of dosage and/or medication in the future if needed  We will be checking on patient's renal function to ensure stability    Type 2 diabetes mellitus with hyperglycemia (HCC)  Lab Results   Component Value Date    HGBA1C 6 7 (H) 08/13/2018       No results for input(s): POCGLU in the last 72 hours  Blood Sugar Average: Last 72 hrs:   orders were written for the patient to have a check on a fasting blood sugar, hemoglobin A1c  Patient to collected to have these labs drawn prior to the visit today  Importantly he will have this done hopefully today  We will call the patient if there is any adjustment in medication necessary  Patient admits the fact that he is not always compliant with his diet  We reinforced the importance of seeing an eye doctor on a yearly basis because of his diabetes  We will check a hemoglobin A1c and fasting blood sugar with his next visit approximately 4 months  We discussed with the patient today again the importance of watching his diet    Hyperlipidemia  Patient besides hypertension diabetes has a history of hyperlipidemia  He remains on atorvastatin 10 mg daily  Was given a slip to check on a lipid profile with his next visit  Again specifically we gave him instructions as far as reducing fats and cholesterol in his diet  We will modify treatment as necessary  Vitamin D deficiency  Patient had a history of vitamin-D deficiency  He was given a bolus dose of 50,000 international units 3 times a week for 4 weeks  He was supposed to start taking supplements specifically of vitamin-D 2000 international units daily  He states he has not been compliant    We will check a vitamin-D level when he returns to the office    Acute atopic conjunctivitis of both eyes  Patient is complaining of some conjunctivitis, eye irritation bilaterally  He states this is been going on over the past few weeks  He states he wakes up in the morning with some crusting to the eyes, no visual changes, on evaluation patient has a conjunctivitis but unsure as to the exact etiology  Patient does not have any evidence of a ascending sinusitis  Patient was placed on TobraDex to use 1 drop 4 times a day to each eye and to call if not improving       Diagnoses and all orders for this visit:    Type 2 diabetes mellitus with hyperglycemia, without long-term current use of insulin ([facility] [address] )  -     Comprehensive metabolic panel; Future  -     Lipid panel; Future  -     TSH, 3rd generation with Free T4 reflex; Future  -     Hemoglobin A1C; Future  -     atorvastatin (LIPITOR) 10 mg tablet; Take 1 tablet (10 mg total) by mouth daily  -     metFORMIN (GLUCOPHAGE-XR) 500 mg 24 hr tablet; Take 1 tablet (500 mg total) by mouth 2 (two) times a day with meals    Essential hypertension  -     Comprehensive metabolic panel; Future  -     Lipid panel; Future  -     TSH, 3rd generation with Free T4 reflex; Future    Familial hypercholesterolemia  -     Comprehensive metabolic panel; Future  -     Lipid panel; Future  -     TSH, 3rd generation with Free T4 reflex; Future  -     atorvastatin (LIPITOR) 10 mg tablet; Take 1 tablet (10 mg total) by mouth daily    Vitamin D deficiency  -     Vitamin D 25 hydroxy; Future    Healthcare maintenance    Acute conjunctivitis of both eyes, unspecified acute conjunctivitis type  -     tobramycin-dexamethasone (TOBRADEX) ophthalmic suspension; Administer 1 drop to both eyes every 4 (four) hours while awake          Subjective:      Patient ID: Ash Pappas is a 79 y o  male  Patient is a 17-year-old male with a history of multiple medical problems including hypertension, hyperlipidemia, diabetes mellitus type 2, vitamin-D deficiency    Patient is here today for routine follow-up  Patient was to have labs performed prior to the visit today but neglected to do so  Patient states in general he has been doing relatively well with no new complaints or concerns  He continues to work full-time as a         The following portions of the patient's history were reviewed and updated as appropriate:   He  has a past medical history of Hyperlipidemia  He   Patient Active Problem List    Diagnosis Date Noted    Acute atopic conjunctivitis of both eyes 12/03/2018    Healthcare maintenance 09/07/2018    Hypertension 10/21/2016    Vitamin D deficiency 03/03/2015    Type 2 diabetes mellitus with hyperglycemia ([facility] [address] ) 11/20/2013    Hyperlipidemia 09/18/2012     He  has a past surgical history that includes Appendectomy  His family history includes Diabetes in his father; Pancreatic cancer in his father; Parkinsonism in his mother  He  reports that he has quit smoking  He has never used smokeless tobacco  He reports that he drinks alcohol  His drug history is not on file  Current Outpatient Prescriptions   Medication Sig Dispense Refill    atorvastatin (LIPITOR) 10 mg tablet Take 1 tablet (10 mg total) by mouth daily 90 tablet 3    ergocalciferol (VITAMIN D2) 50,000 units Take by mouth      losartan (COZAAR) 50 mg tablet Take 1 tablet (50 mg total) by mouth daily 10 tablet 0    metFORMIN (GLUCOPHAGE-XR) 500 mg 24 hr tablet TAKE 1 TABLET TWICE A DAY WITH FOOD 180 tablet 1    atorvastatin (LIPITOR) 10 mg tablet Take 1 tablet (10 mg total) by mouth daily 90 tablet 3    metFORMIN (GLUCOPHAGE-XR) 500 mg 24 hr tablet Take 1 tablet (500 mg total) by mouth 2 (two) times a day with meals 180 tablet 3    tobramycin-dexamethasone (TOBRADEX) ophthalmic suspension Administer 1 drop to both eyes every 4 (four) hours while awake 5 mL 2     No current facility-administered medications for this visit        Current Outpatient Prescriptions on File Prior to Visit Medication Sig    atorvastatin (LIPITOR) 10 mg tablet Take 1 tablet (10 mg total) by mouth daily    ergocalciferol (VITAMIN D2) 50,000 units Take by mouth    losartan (COZAAR) 50 mg tablet Take 1 tablet (50 mg total) by mouth daily    metFORMIN (GLUCOPHAGE-XR) 500 mg 24 hr tablet TAKE 1 TABLET TWICE A DAY WITH FOOD     Current Facility-Administered Medications on File Prior to Visit   Medication    [DISCONTINUED] amoxicillin (AMOXIL) capsule 500 mg     He has No Known Allergies       Review of Systems   Constitutional: Negative  HENT: Negative  Eyes: Positive for redness (Patient relates some irritation, redness to both eyes with occasional discharge and crusting in the morning for the past few weeks)  Negative for photophobia, pain, discharge, itching and visual disturbance  Respiratory: Negative  Cardiovascular: Negative  Gastrointestinal: Negative  Endocrine: Negative  Genitourinary: Negative  Musculoskeletal: Negative  Skin: Negative  Allergic/Immunologic: Negative  Neurological: Negative  Hematological: Negative  Psychiatric/Behavioral: Negative  Objective:      /82   Pulse 75   Temp (!) 97 1 °F (36 2 °C)   Ht 5' 9" (1 753 m)   Wt 90 7 kg (200 lb)   SpO2 98%   BMI 29 53 kg/m²          Physical Exam   Constitutional: He is oriented to person, place, and time  He appears well-developed and well-nourished  No distress  Very pleasant, articulate 61-year-old male who is awake alert no acute distress oriented x3   HENT:   Head: Normocephalic and atraumatic  Right Ear: External ear normal    Left Ear: External ear normal    Nose: Nose normal    Mouth/Throat: Oropharynx is clear and moist  No oropharyngeal exudate  Eyes: Pupils are equal, round, and reactive to light  EOM are normal  Right eye exhibits no discharge  Left eye exhibits no discharge  No scleral icterus     Some mild irritation to the conjunctiva bilaterally but no discharge or exudate Neck: Normal range of motion  Neck supple  No JVD present  No tracheal deviation present  No thyromegaly present  Cardiovascular: Normal rate, regular rhythm, normal heart sounds and intact distal pulses  Exam reveals no gallop and no friction rub  No murmur heard  Pulmonary/Chest: Effort normal and breath sounds normal  No stridor  No respiratory distress  He has no wheezes  He has no rales  He exhibits no tenderness  Abdominal: Soft  Bowel sounds are normal  He exhibits no distension and no mass  There is no tenderness  There is no rebound and no guarding  Musculoskeletal: Normal range of motion  He exhibits no edema, tenderness or deformity  Lymphadenopathy:     He has no cervical adenopathy  Neurological: He is alert and oriented to person, place, and time  He has normal reflexes  No cranial nerve deficit  He exhibits normal muscle tone  Coordination normal    Skin: Skin is warm and dry  No rash noted  He is not diaphoretic  No erythema  No pallor  Psychiatric: He has a normal mood and affect  His behavior is normal  Judgment and thought content normal    Nursing note and vitals reviewed

## 2018-12-03 NOTE — ASSESSMENT & PLAN NOTE
Lab Results   Component Value Date    HGBA1C 6 7 (H) 08/13/2018       No results for input(s): POCGLU in the last 72 hours  Blood Sugar Average: Last 72 hrs:   orders were written for the patient to have a check on a fasting blood sugar, hemoglobin A1c  Patient to collected to have these labs drawn prior to the visit today  Importantly he will have this done hopefully today  We will call the patient if there is any adjustment in medication necessary  Patient admits the fact that he is not always compliant with his diet  We reinforced the importance of seeing an eye doctor on a yearly basis because of his diabetes  We will check a hemoglobin A1c and fasting blood sugar with his next visit approximately 4 months    We discussed with the patient today again the importance of watching his diet

## 2018-12-03 NOTE — ASSESSMENT & PLAN NOTE
As noted patient's blood pressure showing adequate control blood present treatment although his blood pressure can be variable  At this point we will continue with present treatment and make modification of dosage and/or medication in the future if needed    We will be checking on patient's renal function to ensure stability

## 2018-12-03 NOTE — ASSESSMENT & PLAN NOTE
Patient is complaining of some conjunctivitis, eye irritation bilaterally  He states this is been going on over the past few weeks  He states he wakes up in the morning with some crusting to the eyes, no visual changes, on evaluation patient has a conjunctivitis but unsure as to the exact etiology  Patient does not have any evidence of a ascending sinusitis    Patient was placed on TobraDex to use 1 drop 4 times a day to each eye and to call if not improving

## 2019-02-15 DIAGNOSIS — I10 ESSENTIAL HYPERTENSION: ICD-10-CM

## 2019-02-15 RX ORDER — LOSARTAN POTASSIUM 50 MG/1
50 TABLET ORAL DAILY
Qty: 10 TABLET | Refills: 0 | Status: SHIPPED | OUTPATIENT
Start: 2019-02-15 | End: 2019-03-02 | Stop reason: SDUPTHER

## 2019-03-02 DIAGNOSIS — I10 ESSENTIAL HYPERTENSION: ICD-10-CM

## 2019-03-04 RX ORDER — LOSARTAN POTASSIUM 50 MG/1
TABLET ORAL
Qty: 10 TABLET | Refills: 0 | Status: SHIPPED | OUTPATIENT
Start: 2019-03-04 | End: 2019-04-08 | Stop reason: SDUPTHER

## 2019-04-05 ENCOUNTER — APPOINTMENT (OUTPATIENT)
Dept: LAB | Age: 69
End: 2019-04-05
Payer: COMMERCIAL

## 2019-04-05 ENCOUNTER — TRANSCRIBE ORDERS (OUTPATIENT)
Dept: ADMINISTRATIVE | Age: 69
End: 2019-04-05

## 2019-04-05 DIAGNOSIS — I10 ESSENTIAL HYPERTENSION, MALIGNANT: ICD-10-CM

## 2019-04-05 DIAGNOSIS — E78.01 ESSENTIAL FAMILIAL HYPERCHOLESTEROLEMIA: Primary | ICD-10-CM

## 2019-04-05 DIAGNOSIS — E11.65 TYPE 2 DIABETES MELLITUS WITH HYPERGLYCEMIA, WITHOUT LONG-TERM CURRENT USE OF INSULIN (HCC): ICD-10-CM

## 2019-04-05 DIAGNOSIS — E78.01 ESSENTIAL FAMILIAL HYPERCHOLESTEROLEMIA: ICD-10-CM

## 2019-04-05 DIAGNOSIS — E11.641 UNCONTROLLED TYPE 2 DIABETES MELLITUS WITH HYPOGLYCEMIA AND COMA (HCC): ICD-10-CM

## 2019-04-05 LAB
25(OH)D3 SERPL-MCNC: 44 NG/ML (ref 30–100)
ALBUMIN SERPL BCP-MCNC: 4.2 G/DL (ref 3.5–5)
ALP SERPL-CCNC: 57 U/L (ref 46–116)
ALT SERPL W P-5'-P-CCNC: 51 U/L (ref 12–78)
ANION GAP SERPL CALCULATED.3IONS-SCNC: 5 MMOL/L (ref 4–13)
AST SERPL W P-5'-P-CCNC: 24 U/L (ref 5–45)
BILIRUB SERPL-MCNC: 0.53 MG/DL (ref 0.2–1)
BUN SERPL-MCNC: 19 MG/DL (ref 5–25)
CALCIUM SERPL-MCNC: 9.8 MG/DL (ref 8.3–10.1)
CHLORIDE SERPL-SCNC: 104 MMOL/L (ref 100–108)
CHOLEST SERPL-MCNC: 120 MG/DL (ref 50–200)
CO2 SERPL-SCNC: 29 MMOL/L (ref 21–32)
CREAT SERPL-MCNC: 1.01 MG/DL (ref 0.6–1.3)
EST. AVERAGE GLUCOSE BLD GHB EST-MCNC: 143 MG/DL
GFR SERPL CREATININE-BSD FRML MDRD: 77 ML/MIN/1.73SQ M
GLUCOSE P FAST SERPL-MCNC: 125 MG/DL (ref 65–99)
HBA1C MFR BLD: 6.6 % (ref 4.2–6.3)
HDLC SERPL-MCNC: 40 MG/DL (ref 40–60)
LDLC SERPL CALC-MCNC: 60 MG/DL (ref 0–100)
NONHDLC SERPL-MCNC: 80 MG/DL
POTASSIUM SERPL-SCNC: 4.1 MMOL/L (ref 3.5–5.3)
PROT SERPL-MCNC: 7.2 G/DL (ref 6.4–8.2)
SODIUM SERPL-SCNC: 138 MMOL/L (ref 136–145)
TRIGL SERPL-MCNC: 100 MG/DL
TSH SERPL DL<=0.05 MIU/L-ACNC: 1.49 UIU/ML (ref 0.36–3.74)

## 2019-04-05 PROCEDURE — 82306 VITAMIN D 25 HYDROXY: CPT

## 2019-04-05 PROCEDURE — 36415 COLL VENOUS BLD VENIPUNCTURE: CPT

## 2019-04-05 PROCEDURE — 80061 LIPID PANEL: CPT

## 2019-04-05 PROCEDURE — 80053 COMPREHEN METABOLIC PANEL: CPT

## 2019-04-05 PROCEDURE — 84443 ASSAY THYROID STIM HORMONE: CPT

## 2019-04-05 PROCEDURE — 83036 HEMOGLOBIN GLYCOSYLATED A1C: CPT

## 2019-04-08 ENCOUNTER — OFFICE VISIT (OUTPATIENT)
Dept: INTERNAL MEDICINE CLINIC | Facility: CLINIC | Age: 69
End: 2019-04-08
Payer: COMMERCIAL

## 2019-04-08 VITALS
OXYGEN SATURATION: 98 % | HEIGHT: 69 IN | HEART RATE: 82 BPM | DIASTOLIC BLOOD PRESSURE: 78 MMHG | TEMPERATURE: 97.6 F | BODY MASS INDEX: 28.44 KG/M2 | SYSTOLIC BLOOD PRESSURE: 130 MMHG | WEIGHT: 192 LBS

## 2019-04-08 DIAGNOSIS — I10 ESSENTIAL HYPERTENSION: ICD-10-CM

## 2019-04-08 DIAGNOSIS — E78.01 FAMILIAL HYPERCHOLESTEROLEMIA: ICD-10-CM

## 2019-04-08 DIAGNOSIS — E55.9 VITAMIN D DEFICIENCY: ICD-10-CM

## 2019-04-08 DIAGNOSIS — E11.65 TYPE 2 DIABETES MELLITUS WITH HYPERGLYCEMIA, WITHOUT LONG-TERM CURRENT USE OF INSULIN (HCC): Primary | ICD-10-CM

## 2019-04-08 DIAGNOSIS — E66.3 OVERWEIGHT (BMI 25.0-29.9): ICD-10-CM

## 2019-04-08 DIAGNOSIS — I10 ESSENTIAL HYPERTENSION: Primary | ICD-10-CM

## 2019-04-08 PROCEDURE — 1036F TOBACCO NON-USER: CPT | Performed by: INTERNAL MEDICINE

## 2019-04-08 PROCEDURE — 99214 OFFICE O/P EST MOD 30 MIN: CPT | Performed by: INTERNAL MEDICINE

## 2019-04-08 PROCEDURE — 3008F BODY MASS INDEX DOCD: CPT | Performed by: INTERNAL MEDICINE

## 2019-04-08 PROCEDURE — 1160F RVW MEDS BY RX/DR IN RCRD: CPT | Performed by: INTERNAL MEDICINE

## 2019-04-08 PROCEDURE — 4010F ACE/ARB THERAPY RXD/TAKEN: CPT | Performed by: INTERNAL MEDICINE

## 2019-04-08 PROCEDURE — 3078F DIAST BP <80 MM HG: CPT | Performed by: INTERNAL MEDICINE

## 2019-04-08 PROCEDURE — 3075F SYST BP GE 130 - 139MM HG: CPT | Performed by: INTERNAL MEDICINE

## 2019-04-08 RX ORDER — LOSARTAN POTASSIUM 50 MG/1
50 TABLET ORAL DAILY
Qty: 90 TABLET | Refills: 3 | Status: SHIPPED | OUTPATIENT
Start: 2019-04-08 | End: 2020-01-02 | Stop reason: SDUPTHER

## 2019-04-08 RX ORDER — LOSARTAN POTASSIUM 50 MG/1
TABLET ORAL
Qty: 90 TABLET | Refills: 2 | Status: SHIPPED | OUTPATIENT
Start: 2019-04-08 | End: 2019-09-11 | Stop reason: SDUPTHER

## 2019-08-06 ENCOUNTER — TRANSCRIBE ORDERS (OUTPATIENT)
Dept: LAB | Facility: CLINIC | Age: 69
End: 2019-08-06

## 2019-08-06 ENCOUNTER — APPOINTMENT (OUTPATIENT)
Dept: LAB | Facility: CLINIC | Age: 69
End: 2019-08-06
Payer: COMMERCIAL

## 2019-08-06 DIAGNOSIS — E11.65 TYPE 2 DIABETES MELLITUS WITH HYPERGLYCEMIA, WITHOUT LONG-TERM CURRENT USE OF INSULIN (HCC): ICD-10-CM

## 2019-08-06 LAB
ANION GAP SERPL CALCULATED.3IONS-SCNC: 13 MMOL/L (ref 4–13)
BUN SERPL-MCNC: 18 MG/DL (ref 5–25)
CALCIUM SERPL-MCNC: 9.2 MG/DL (ref 8.3–10.1)
CHLORIDE SERPL-SCNC: 106 MMOL/L (ref 100–108)
CO2 SERPL-SCNC: 24 MMOL/L (ref 21–32)
CREAT SERPL-MCNC: 1.09 MG/DL (ref 0.6–1.3)
EST. AVERAGE GLUCOSE BLD GHB EST-MCNC: 134 MG/DL
GFR SERPL CREATININE-BSD FRML MDRD: 70 ML/MIN/1.73SQ M
GLUCOSE P FAST SERPL-MCNC: 99 MG/DL (ref 65–99)
HBA1C MFR BLD: 6.3 % (ref 4.2–6.3)
POTASSIUM SERPL-SCNC: 4.3 MMOL/L (ref 3.5–5.3)
SODIUM SERPL-SCNC: 143 MMOL/L (ref 136–145)

## 2019-08-06 PROCEDURE — 36415 COLL VENOUS BLD VENIPUNCTURE: CPT

## 2019-08-06 PROCEDURE — 80048 BASIC METABOLIC PNL TOTAL CA: CPT

## 2019-08-06 PROCEDURE — 83036 HEMOGLOBIN GLYCOSYLATED A1C: CPT

## 2019-08-09 ENCOUNTER — OFFICE VISIT (OUTPATIENT)
Dept: INTERNAL MEDICINE CLINIC | Facility: CLINIC | Age: 69
End: 2019-08-09
Payer: COMMERCIAL

## 2019-08-09 VITALS
HEART RATE: 65 BPM | BODY MASS INDEX: 28.44 KG/M2 | TEMPERATURE: 97.2 F | WEIGHT: 192 LBS | OXYGEN SATURATION: 98 % | DIASTOLIC BLOOD PRESSURE: 74 MMHG | SYSTOLIC BLOOD PRESSURE: 126 MMHG | HEIGHT: 69 IN

## 2019-08-09 DIAGNOSIS — I10 ESSENTIAL HYPERTENSION: ICD-10-CM

## 2019-08-09 DIAGNOSIS — E11.65 TYPE 2 DIABETES MELLITUS WITH HYPERGLYCEMIA, WITHOUT LONG-TERM CURRENT USE OF INSULIN (HCC): ICD-10-CM

## 2019-08-09 DIAGNOSIS — E78.01 FAMILIAL HYPERCHOLESTEROLEMIA: ICD-10-CM

## 2019-08-09 DIAGNOSIS — E55.9 VITAMIN D DEFICIENCY: Primary | ICD-10-CM

## 2019-08-09 DIAGNOSIS — Z12.5 PROSTATE CANCER SCREENING: ICD-10-CM

## 2019-08-09 DIAGNOSIS — Z12.11 COLON CANCER SCREENING: ICD-10-CM

## 2019-08-09 PROCEDURE — 3078F DIAST BP <80 MM HG: CPT | Performed by: INTERNAL MEDICINE

## 2019-08-09 PROCEDURE — 1160F RVW MEDS BY RX/DR IN RCRD: CPT | Performed by: INTERNAL MEDICINE

## 2019-08-09 PROCEDURE — 3074F SYST BP LT 130 MM HG: CPT | Performed by: INTERNAL MEDICINE

## 2019-08-09 PROCEDURE — 1036F TOBACCO NON-USER: CPT | Performed by: INTERNAL MEDICINE

## 2019-08-09 PROCEDURE — 3008F BODY MASS INDEX DOCD: CPT | Performed by: INTERNAL MEDICINE

## 2019-08-09 PROCEDURE — 1101F PT FALLS ASSESS-DOCD LE1/YR: CPT | Performed by: INTERNAL MEDICINE

## 2019-08-09 PROCEDURE — 99214 OFFICE O/P EST MOD 30 MIN: CPT | Performed by: INTERNAL MEDICINE

## 2019-08-09 NOTE — ASSESSMENT & PLAN NOTE
Lab Results   Component Value Date    HGBA1C 6 3 08/06/2019       No results for input(s): POCGLU in the last 72 hours  Blood Sugar Average: Last 72 hrs:   patient did have labs performed prior to the visit today  We did discuss the results and he is showing excellent control of his diabetes  He states that he the beach changes that he is actually compliant with his medication and taking the metformin on a regular basis  Patient was commended and impressed with the improvement  We will continue to monitor this and make adjustments to medication in the future if needed    Patient will be making an appointment in the near future to have a diabetic eye exam   Diabetic foot exam was performed today and this is negative

## 2019-08-09 NOTE — ASSESSMENT & PLAN NOTE
Again patient is taking vitamin-D on a daily basis    We will check a level with his next visit and make adjustments to medication if needed

## 2019-08-09 NOTE — PROGRESS NOTES
Diabetic Foot Exam    Patient's shoes and socks removed  Right Foot/Ankle   Right Foot Inspection  Skin Exam: skin normal and skin intact no dry skin, no warmth, no callus, no erythema, no maceration, no abnormal color, no pre-ulcer, no ulcer and no callus                          Toe Exam: ROM and strength within normal limitsno swelling, no tenderness, erythema and  no right toe deformity  Sensory   Vibration: intact  Proprioception: intact   Monofilament testing: intact  Vascular  Capillary refills: < 3 seconds  The right DP pulse is 2+  The right PT pulse is 2+  Left Foot/Ankle  Left Foot Inspection  Skin Exam: skin normal and skin intactno dry skin, no warmth, no erythema, no maceration, normal color, no pre-ulcer, no ulcer and no callus                         Toe Exam: ROM and strength within normal limitsno swelling, no tenderness, no erythema and no left toe deformity                   Sensory   Vibration: intact  Proprioception: intact  Monofilament: intact  Vascular    The left DP pulse is 2+  The left PT pulse is 2+  Assign Risk Category:  No deformity present; No loss of protective sensation;  No weak pulses       Risk: 0

## 2019-08-09 NOTE — PROGRESS NOTES
Assessment/Plan:    Type 2 diabetes mellitus with hyperglycemia (HCC)  Lab Results   Component Value Date    HGBA1C 6 3 08/06/2019       No results for input(s): POCGLU in the last 72 hours  Blood Sugar Average: Last 72 hrs:   patient did have labs performed prior to the visit today  We did discuss the results and he is showing excellent control of his diabetes  He states that he the beach changes that he is actually compliant with his medication and taking the metformin on a regular basis  Patient was commended and impressed with the improvement  We will continue to monitor this and make adjustments to medication in the future if needed  Patient will be making an appointment in the near future to have a diabetic eye exam   Diabetic foot exam was performed today and this is negative    Hypertension  Blood pressure showing excellent control  Patient will continue present medication and surveillance  His renal function is normal     Hyperlipidemia  Patient does have a history of hyperlipidemia  He was told the importance of diet especially in light of his other comorbid conditions including hypertension, diabetes  We will check a lipid profile with his next visit and treat accordingly  He states he is compliant with taking his atorvastatin daily       Diagnoses and all orders for this visit:    Vitamin D deficiency  -     Vitamin D 25 hydroxy; Future    Type 2 diabetes mellitus with hyperglycemia, without long-term current use of insulin (HCC)  -     Comprehensive metabolic panel; Future  -     CBC and differential; Future  -     Lipid panel; Future  -     TSH, 3rd generation with Free T4 reflex; Future  -     Urinalysis with reflex to microscopic; Future  -     Microalbumin / creatinine urine ratio  -     Hemoglobin A1C; Future    Essential hypertension  -     Comprehensive metabolic panel; Future  -     Lipid panel; Future  -     TSH, 3rd generation with Free T4 reflex;  Future  -     Urinalysis with reflex to microscopic; Future    Familial hypercholesterolemia  -     Lipid panel; Future    Prostate cancer screening  -     PSA, Total Screen; Future    Colon cancer screening  -     Occult Blood, Fecal Immunochemical; Future          Subjective:      Patient ID: Giorgi Doherty is a 79 y o  male  Patient is a 82-year-old male with a history of multiple medical problems including hypertension, hyperlipidemia, diabetes mellitus type 2  Patient is here today for routine follow-up  Patient states in general he is feeling well although he does have occasional discomfort to the lateral portion of both knees at the tibial plateau occasionally  He did have labs prior to being seen today we did discuss the results showing excellent control of his blood sugars  The following portions of the patient's history were reviewed and updated as appropriate:   He  has a past medical history of Hyperlipidemia  He   Patient Active Problem List    Diagnosis Date Noted    Overweight (BMI 25 0-29 9) 04/08/2019    Acute atopic conjunctivitis of both eyes 12/03/2018    Healthcare maintenance 09/07/2018    Hypertension 10/21/2016    Vitamin D deficiency 03/03/2015    [facility] 11/20/2013    Hyperlipidemia 09/18/2012     He  has a past surgical history that includes Appendectomy  His family history includes Diabetes in his father; Pancreatic cancer in his father; Parkinsonism in his mother  He  reports that he has quit smoking  He has never used smokeless tobacco  He reports that he drinks alcohol  His drug history is not on file    Current Outpatient Medications   Medication Sig Dispense Refill    atorvastatin (LIPITOR) 10 mg tablet Take 1 tablet (10 mg total) by mouth daily 90 tablet 3    ergocalciferol (VITAMIN D2) 50,000 units Take by mouth      losartan (COZAAR) 50 mg tablet TAKE 1 TABLET BY MOUTH EVERY DAY 90 tablet 2    losartan (COZAAR) 50 mg tablet Take 1 tablet (50 mg total) by mouth daily 90 tablet 3    metFORMIN (GLUCOPHAGE-XR) 500 mg 24 hr tablet Take 1 tablet (500 mg total) by mouth 2 (two) times a day with meals 180 tablet 3    tobramycin-dexamethasone (TOBRADEX) ophthalmic suspension Administer 1 drop to both eyes every 4 (four) hours while awake 5 mL 2     No current facility-administered medications for this visit  Current Outpatient Medications on File Prior to Visit   Medication Sig    atorvastatin (LIPITOR) 10 mg tablet Take 1 tablet (10 mg total) by mouth daily    ergocalciferol (VITAMIN D2) 50,000 units Take by mouth    losartan (COZAAR) 50 mg tablet TAKE 1 TABLET BY MOUTH EVERY DAY    losartan (COZAAR) 50 mg tablet Take 1 tablet (50 mg total) by mouth daily    metFORMIN (GLUCOPHAGE-XR) 500 mg 24 hr tablet Take 1 tablet (500 mg total) by mouth 2 (two) times a day with meals    tobramycin-dexamethasone (TOBRADEX) ophthalmic suspension Administer 1 drop to both eyes every 4 (four) hours while awake    [DISCONTINUED] metFORMIN (GLUCOPHAGE-XR) 500 mg 24 hr tablet TAKE 1 TABLET TWICE A DAY WITH FOOD     No current facility-administered medications on file prior to visit  He has No Known Allergies       Review of Systems   Constitutional: Negative  HENT: Negative  Eyes: Negative  Respiratory: Negative  Cardiovascular: Negative  Gastrointestinal: Negative  Endocrine: Negative  Genitourinary: Negative  Musculoskeletal: Positive for arthralgias  Negative for back pain, gait problem, joint swelling, myalgias, neck pain and neck stiffness  Skin: Negative  Allergic/Immunologic: Negative  Neurological: Negative  Hematological: Negative  Psychiatric/Behavioral: Negative  Objective:      /74   Pulse 65   Temp (!) 97 2 °F (36 2 °C)   Ht 5' 9" (1 753 m)   Wt 87 1 kg (192 lb)   SpO2 98%   BMI 28 35 kg/m²          Physical Exam   Constitutional: He is oriented to person, place, and time   He appears well-developed and well-nourished  No distress  Pleasant, mildly overweight 32-year-old male who is awake alert no acute distress and oriented x3   HENT:   Head: Normocephalic and atraumatic  Right Ear: External ear normal    Left Ear: External ear normal    Nose: Nose normal    Mouth/Throat: Oropharynx is clear and moist  No oropharyngeal exudate  Eyes: Pupils are equal, round, and reactive to light  Conjunctivae and EOM are normal  Right eye exhibits no discharge  Left eye exhibits no discharge  No scleral icterus  Neck: Normal range of motion  Neck supple  No JVD present  No tracheal deviation present  No thyromegaly present  Cardiovascular: Normal rate, regular rhythm, normal heart sounds and intact distal pulses  Exam reveals no gallop and no friction rub  No murmur heard  Pulmonary/Chest: Effort normal and breath sounds normal  No stridor  No respiratory distress  He has no wheezes  He has no rales  He exhibits no tenderness  Abdominal: Soft  Bowel sounds are normal  He exhibits no distension and no mass  There is no tenderness  There is no rebound and no guarding  No hernia  Musculoskeletal: Normal range of motion  He exhibits tenderness and deformity  He exhibits no edema  Patient on evaluation does have some some very slight tenderness to palpation to the tibial plateau to both knees  No effusion and no gross deformity, full range of motion without pain crepitus  Patient does have some decreased tone to the quadriceps bilaterally   Lymphadenopathy:     He has no cervical adenopathy  Neurological: He is alert and oriented to person, place, and time  He displays normal reflexes  No cranial nerve deficit or sensory deficit  He exhibits normal muscle tone  Coordination normal    Skin: Skin is warm and dry  Capillary refill takes less than 2 seconds  No rash noted  He is not diaphoretic  No erythema  No pallor  Psychiatric: He has a normal mood and affect   His behavior is normal  Judgment and thought content normal    Nursing note and vitals reviewed

## 2019-08-09 NOTE — ASSESSMENT & PLAN NOTE
Blood pressure showing excellent control  Patient will continue present medication and surveillance    His renal function is normal

## 2019-08-09 NOTE — ASSESSMENT & PLAN NOTE
Patient does have a history of hyperlipidemia  He was told the importance of diet especially in light of his other comorbid conditions including hypertension, diabetes  We will check a lipid profile with his next visit and treat accordingly    He states he is compliant with taking his atorvastatin daily

## 2019-09-11 DIAGNOSIS — I10 ESSENTIAL HYPERTENSION: ICD-10-CM

## 2019-09-11 RX ORDER — LOSARTAN POTASSIUM 50 MG/1
TABLET ORAL
Qty: 90 TABLET | Refills: 2 | Status: SHIPPED | OUTPATIENT
Start: 2019-09-11 | End: 2020-03-18 | Stop reason: SDUPTHER

## 2019-12-09 ENCOUNTER — APPOINTMENT (OUTPATIENT)
Dept: LAB | Age: 69
End: 2019-12-09
Payer: COMMERCIAL

## 2019-12-09 DIAGNOSIS — Z12.5 PROSTATE CANCER SCREENING: ICD-10-CM

## 2019-12-09 DIAGNOSIS — E55.9 VITAMIN D DEFICIENCY: ICD-10-CM

## 2019-12-09 DIAGNOSIS — E78.01 FAMILIAL HYPERCHOLESTEROLEMIA: ICD-10-CM

## 2019-12-09 DIAGNOSIS — I10 ESSENTIAL HYPERTENSION: ICD-10-CM

## 2019-12-09 DIAGNOSIS — E11.65 TYPE 2 DIABETES MELLITUS WITH HYPERGLYCEMIA, WITHOUT LONG-TERM CURRENT USE OF INSULIN (HCC): ICD-10-CM

## 2019-12-09 LAB
25(OH)D3 SERPL-MCNC: 43.7 NG/ML (ref 30–100)
ALBUMIN SERPL BCP-MCNC: 4.5 G/DL (ref 3.5–5)
ALP SERPL-CCNC: 54 U/L (ref 46–116)
ALT SERPL W P-5'-P-CCNC: 51 U/L (ref 12–78)
ANION GAP SERPL CALCULATED.3IONS-SCNC: 5 MMOL/L (ref 4–13)
AST SERPL W P-5'-P-CCNC: 24 U/L (ref 5–45)
BASOPHILS # BLD AUTO: 0.05 THOUSANDS/ΜL (ref 0–0.1)
BASOPHILS NFR BLD AUTO: 1 % (ref 0–1)
BILIRUB SERPL-MCNC: 0.53 MG/DL (ref 0.2–1)
BILIRUB UR QL STRIP: NEGATIVE
BUN SERPL-MCNC: 13 MG/DL (ref 5–25)
CALCIUM SERPL-MCNC: 9.4 MG/DL (ref 8.3–10.1)
CHLORIDE SERPL-SCNC: 108 MMOL/L (ref 100–108)
CHOLEST SERPL-MCNC: 163 MG/DL (ref 50–200)
CLARITY UR: CLEAR
CO2 SERPL-SCNC: 28 MMOL/L (ref 21–32)
COLOR UR: YELLOW
CREAT SERPL-MCNC: 1.1 MG/DL (ref 0.6–1.3)
CREAT UR-MCNC: 231 MG/DL
EOSINOPHIL # BLD AUTO: 0.32 THOUSAND/ΜL (ref 0–0.61)
EOSINOPHIL NFR BLD AUTO: 5 % (ref 0–6)
ERYTHROCYTE [DISTWIDTH] IN BLOOD BY AUTOMATED COUNT: 13.5 % (ref 11.6–15.1)
EST. AVERAGE GLUCOSE BLD GHB EST-MCNC: 134 MG/DL
GFR SERPL CREATININE-BSD FRML MDRD: 69 ML/MIN/1.73SQ M
GLUCOSE P FAST SERPL-MCNC: 119 MG/DL (ref 65–99)
GLUCOSE UR STRIP-MCNC: NEGATIVE MG/DL
HBA1C MFR BLD: 6.3 % (ref 4.2–6.3)
HCT VFR BLD AUTO: 45.7 % (ref 36.5–49.3)
HDLC SERPL-MCNC: 65 MG/DL
HGB BLD-MCNC: 15 G/DL (ref 12–17)
HGB UR QL STRIP.AUTO: NEGATIVE
IMM GRANULOCYTES # BLD AUTO: 0.03 THOUSAND/UL (ref 0–0.2)
IMM GRANULOCYTES NFR BLD AUTO: 0 % (ref 0–2)
KETONES UR STRIP-MCNC: NEGATIVE MG/DL
LDLC SERPL CALC-MCNC: 80 MG/DL (ref 0–100)
LEUKOCYTE ESTERASE UR QL STRIP: NEGATIVE
LYMPHOCYTES # BLD AUTO: 2.25 THOUSANDS/ΜL (ref 0.6–4.47)
LYMPHOCYTES NFR BLD AUTO: 33 % (ref 14–44)
MCH RBC QN AUTO: 30.7 PG (ref 26.8–34.3)
MCHC RBC AUTO-ENTMCNC: 32.8 G/DL (ref 31.4–37.4)
MCV RBC AUTO: 94 FL (ref 82–98)
MICROALBUMIN UR-MCNC: 24.8 MG/L (ref 0–20)
MICROALBUMIN/CREAT 24H UR: 11 MG/G CREATININE (ref 0–30)
MONOCYTES # BLD AUTO: 0.59 THOUSAND/ΜL (ref 0.17–1.22)
MONOCYTES NFR BLD AUTO: 9 % (ref 4–12)
NEUTROPHILS # BLD AUTO: 3.58 THOUSANDS/ΜL (ref 1.85–7.62)
NEUTS SEG NFR BLD AUTO: 52 % (ref 43–75)
NITRITE UR QL STRIP: NEGATIVE
NONHDLC SERPL-MCNC: 98 MG/DL
NRBC BLD AUTO-RTO: 0 /100 WBCS
PH UR STRIP.AUTO: 6 [PH]
PLATELET # BLD AUTO: 227 THOUSANDS/UL (ref 149–390)
PMV BLD AUTO: 10 FL (ref 8.9–12.7)
POTASSIUM SERPL-SCNC: 4.3 MMOL/L (ref 3.5–5.3)
PROT SERPL-MCNC: 7 G/DL (ref 6.4–8.2)
PROT UR STRIP-MCNC: NEGATIVE MG/DL
PSA SERPL-MCNC: 1.3 NG/ML (ref 0–4)
RBC # BLD AUTO: 4.88 MILLION/UL (ref 3.88–5.62)
SODIUM SERPL-SCNC: 141 MMOL/L (ref 136–145)
SP GR UR STRIP.AUTO: 1.02 (ref 1–1.03)
TRIGL SERPL-MCNC: 89 MG/DL
TSH SERPL DL<=0.05 MIU/L-ACNC: 1.76 UIU/ML (ref 0.36–3.74)
UROBILINOGEN UR QL STRIP.AUTO: 0.2 E.U./DL
WBC # BLD AUTO: 6.82 THOUSAND/UL (ref 4.31–10.16)

## 2019-12-09 PROCEDURE — 82570 ASSAY OF URINE CREATININE: CPT | Performed by: INTERNAL MEDICINE

## 2019-12-09 PROCEDURE — 81003 URINALYSIS AUTO W/O SCOPE: CPT

## 2019-12-09 PROCEDURE — 83036 HEMOGLOBIN GLYCOSYLATED A1C: CPT

## 2019-12-09 PROCEDURE — G0103 PSA SCREENING: HCPCS

## 2019-12-09 PROCEDURE — 84443 ASSAY THYROID STIM HORMONE: CPT

## 2019-12-09 PROCEDURE — 82043 UR ALBUMIN QUANTITATIVE: CPT | Performed by: INTERNAL MEDICINE

## 2019-12-09 PROCEDURE — 36415 COLL VENOUS BLD VENIPUNCTURE: CPT

## 2019-12-09 PROCEDURE — 80061 LIPID PANEL: CPT

## 2019-12-09 PROCEDURE — 85025 COMPLETE CBC W/AUTO DIFF WBC: CPT

## 2019-12-09 PROCEDURE — 80053 COMPREHEN METABOLIC PANEL: CPT

## 2019-12-09 PROCEDURE — 82306 VITAMIN D 25 HYDROXY: CPT

## 2020-01-02 DIAGNOSIS — E78.01 FAMILIAL HYPERCHOLESTEROLEMIA: ICD-10-CM

## 2020-01-02 DIAGNOSIS — I10 ESSENTIAL HYPERTENSION: ICD-10-CM

## 2020-01-02 DIAGNOSIS — E11.65 TYPE 2 DIABETES MELLITUS WITH HYPERGLYCEMIA, WITHOUT LONG-TERM CURRENT USE OF INSULIN (HCC): ICD-10-CM

## 2020-01-02 PROCEDURE — 4010F ACE/ARB THERAPY RXD/TAKEN: CPT | Performed by: INTERNAL MEDICINE

## 2020-01-02 RX ORDER — ATORVASTATIN CALCIUM 10 MG/1
10 TABLET, FILM COATED ORAL DAILY
Qty: 90 TABLET | Refills: 0 | Status: SHIPPED | OUTPATIENT
Start: 2020-01-02 | End: 2020-03-18 | Stop reason: SDUPTHER

## 2020-01-02 RX ORDER — LOSARTAN POTASSIUM 50 MG/1
50 TABLET ORAL DAILY
Qty: 90 TABLET | Refills: 0 | Status: SHIPPED | OUTPATIENT
Start: 2020-01-02 | End: 2020-12-14 | Stop reason: SDUPTHER

## 2020-01-17 ENCOUNTER — OFFICE VISIT (OUTPATIENT)
Dept: INTERNAL MEDICINE CLINIC | Facility: CLINIC | Age: 70
End: 2020-01-17
Payer: COMMERCIAL

## 2020-01-17 VITALS
RESPIRATION RATE: 16 BRPM | HEART RATE: 83 BPM | BODY MASS INDEX: 28.14 KG/M2 | TEMPERATURE: 99.4 F | DIASTOLIC BLOOD PRESSURE: 86 MMHG | SYSTOLIC BLOOD PRESSURE: 138 MMHG | WEIGHT: 190 LBS | HEIGHT: 69 IN | OXYGEN SATURATION: 97 %

## 2020-01-17 DIAGNOSIS — J40 BRONCHITIS: Primary | ICD-10-CM

## 2020-01-17 DIAGNOSIS — I10 ESSENTIAL HYPERTENSION: ICD-10-CM

## 2020-01-17 DIAGNOSIS — J01.10 ACUTE NON-RECURRENT FRONTAL SINUSITIS: ICD-10-CM

## 2020-01-17 PROCEDURE — 3008F BODY MASS INDEX DOCD: CPT | Performed by: INTERNAL MEDICINE

## 2020-01-17 PROCEDURE — 3079F DIAST BP 80-89 MM HG: CPT | Performed by: INTERNAL MEDICINE

## 2020-01-17 PROCEDURE — 99214 OFFICE O/P EST MOD 30 MIN: CPT | Performed by: INTERNAL MEDICINE

## 2020-01-17 PROCEDURE — 3075F SYST BP GE 130 - 139MM HG: CPT | Performed by: INTERNAL MEDICINE

## 2020-01-17 PROCEDURE — 1160F RVW MEDS BY RX/DR IN RCRD: CPT | Performed by: INTERNAL MEDICINE

## 2020-01-17 RX ORDER — FLUTICASONE PROPIONATE 50 MCG
1 SPRAY, SUSPENSION (ML) NASAL DAILY
COMMUNITY

## 2020-01-17 RX ORDER — AMOXICILLIN AND CLAVULANATE POTASSIUM 500; 125 MG/1; MG/1
1 TABLET, FILM COATED ORAL EVERY 12 HOURS SCHEDULED
Qty: 20 TABLET | Refills: 0 | Status: SHIPPED | OUTPATIENT
Start: 2020-01-17 | End: 2020-01-27

## 2020-01-17 RX ORDER — ALBUTEROL SULFATE 90 UG/1
2 AEROSOL, METERED RESPIRATORY (INHALATION) EVERY 6 HOURS PRN
Qty: 1 INHALER | Refills: 5 | Status: SHIPPED | OUTPATIENT
Start: 2020-01-17

## 2020-01-17 NOTE — ASSESSMENT & PLAN NOTE
Along with his sinus infection patient also has evidence of bronchitis  On evaluation of lungs patient has severely decreased breath sounds anteriorly and posteriorly  Patient did undergo a treatment today in the office without acute oral via mini nebulizer  Patient states he did have a profound improvement in his ventilation and did have better air flow postprocedure on examination  Patient was given a prescription for Proventil inhaler to use 2 puffs every 4 hours as needed to help with his chest congestion    Again patient is informed of the importance of calling if not improving worsening of symptoms even over the weekend

## 2020-01-17 NOTE — PROGRESS NOTES
Assessment/Plan:    Acute non-recurrent frontal sinusitis  Patient is here today for evaluation  States that he has been ill for approximately 1 week to 10 days  He states he 1st started with some nasal congestion and then chest congestion  He states he is having a large amount of thick yellow to greenish mucus from the nares, again coughing up a lot of green to yellow mucus  On evaluation patient has evidence with enlarged red turbinates and a thick yellow nasal discharge of a sinus infection  Patient was placed on Augmentin 500 mg p o  b i d  for 10 days and was told to take this medication with food keep well hydrated and call if not improving  Will  a humidifier to use in the bedroom especially    Bronchitis  Along with his sinus infection patient also has evidence of bronchitis  On evaluation of lungs patient has severely decreased breath sounds anteriorly and posteriorly  Patient did undergo a treatment today in the office without acute oral via mini nebulizer  Patient states he did have a profound improvement in his ventilation and did have better air flow postprocedure on examination  Patient was given a prescription for Proventil inhaler to use 2 puffs every 4 hours as needed to help with his chest congestion  Again patient is informed of the importance of calling if not improving worsening of symptoms even over the weekend    Hypertension  Patient's blood pressure is controlled  We will continue also to monitor his renal function  Diagnoses and all orders for this visit:    Bronchitis  -     amoxicillin-clavulanate (AUGMENTIN) 500-125 mg per tablet; Take 1 tablet by mouth every 12 (twelve) hours for 10 days  -     albuterol (PROVENTIL HFA,VENTOLIN HFA) 90 mcg/act inhaler;  Inhale 2 puffs every 6 (six) hours as needed for wheezing or shortness of breath    Acute non-recurrent frontal sinusitis    Essential hypertension    Other orders  -     fluticasone (FLONASE) 50 mcg/act nasal spray; 1 spray into each nostril daily          Subjective:      Patient ID: Anju Cardoza is a 81 y o  male  Patient is a 61-year-old male with a history of hypertension, diabetes mellitus type 2  Patient is here today for evaluation of an acute upper respiratory tract infection, sinus congestion, cough  Producing thick yellow to green mucus from both the nares and with cough  Some shortness of breath  He is not using any over-the-counter medications for this either for his nasal congestion or cough but has a Flonase inhaler that he has been using which he states is helpful but only for some of his nasal congestion  He states further that he has had some low-grade temperatures but no high-grade fevers  The following portions of the patient's history were reviewed and updated as appropriate: He  has a past medical history of Hyperlipidemia  He   Patient Active Problem List    Diagnosis Date Noted    Acute non-recurrent frontal sinusitis 01/17/2020    Bronchitis 01/17/2020    Overweight (BMI 25 0-29 9) 04/08/2019    Acute atopic conjunctivitis of both eyes 12/03/2018    Healthcare maintenance 09/07/2018    Hypertension 10/21/2016    Vitamin D deficiency 03/03/2015    Type 2 [facility] ) 11/20/2013    Hyperlipidemia 09/18/2012     He  has a past surgical history that includes Appendectomy  His family history includes Diabetes in his father; Pancreatic cancer in his father; Parkinsonism in his mother  He  reports that he has quit smoking  He has never used smokeless tobacco  He reports that he drinks alcohol  His drug history is not on file    Current Outpatient Medications   Medication Sig Dispense Refill    atorvastatin (LIPITOR) 10 mg tablet Take 1 tablet (10 mg total) by mouth daily 90 tablet 0    ergocalciferol (VITAMIN D2) 50,000 units Take by mouth      fluticasone (FLONASE) 50 mcg/act nasal spray 1 spray into each nostril daily      losartan (COZAAR) 50 mg tablet Take 1 tablet (50 mg total) by mouth daily 90 tablet 0    metFORMIN (GLUCOPHAGE-XR) 500 mg 24 hr tablet Take 1 tablet (500 mg total) by mouth 2 (two) times a day with meals 180 tablet 3    tobramycin-dexamethasone (TOBRADEX) ophthalmic suspension Administer 1 drop to both eyes every 4 (four) hours while awake 5 mL 2    albuterol (PROVENTIL HFA,VENTOLIN HFA) 90 mcg/act inhaler Inhale 2 puffs every 6 (six) hours as needed for wheezing or shortness of breath 1 Inhaler 5    amoxicillin-clavulanate (AUGMENTIN) 500-125 mg per tablet Take 1 tablet by mouth every 12 (twelve) hours for 10 days 20 tablet 0    losartan (COZAAR) 50 mg tablet TAKE 1 TABLET BY MOUTH EVERY DAY (Patient not taking: Reported on 1/17/2020) 90 tablet 2     No current facility-administered medications for this visit  Current Outpatient Medications on File Prior to Visit   Medication Sig    atorvastatin (LIPITOR) 10 mg tablet Take 1 tablet (10 mg total) by mouth daily    ergocalciferol (VITAMIN D2) 50,000 units Take by mouth    fluticasone (FLONASE) 50 mcg/act nasal spray 1 spray into each nostril daily    losartan (COZAAR) 50 mg tablet Take 1 tablet (50 mg total) by mouth daily    metFORMIN (GLUCOPHAGE-XR) 500 mg 24 hr tablet Take 1 tablet (500 mg total) by mouth 2 (two) times a day with meals    tobramycin-dexamethasone (TOBRADEX) ophthalmic suspension Administer 1 drop to both eyes every 4 (four) hours while awake    losartan (COZAAR) 50 mg tablet TAKE 1 TABLET BY MOUTH EVERY DAY (Patient not taking: Reported on 1/17/2020)     No current facility-administered medications on file prior to visit  He has No Known Allergies       Review of Systems   Constitutional: Positive for activity change (States because of his illness he has restricted his activity level mildly) and fatigue  Negative for appetite change, chills, diaphoresis, fever and unexpected weight change     HENT: Positive for congestion, postnasal drip, rhinorrhea and sore throat  Negative for dental problem, drooling, ear discharge, ear pain, facial swelling, hearing loss, mouth sores, nosebleeds, sinus pressure, sinus pain, sneezing, tinnitus, trouble swallowing and voice change  Eyes: Negative  Respiratory: Positive for cough and wheezing  Negative for apnea, choking, chest tightness, shortness of breath and stridor  Cardiovascular: Negative  Gastrointestinal: Negative  Endocrine: Negative  Genitourinary: Negative  Musculoskeletal: Negative  Neurological: Negative  Hematological: Negative  Psychiatric/Behavioral: Negative  Objective:      /86   Pulse 83   Temp 99 4 °F (37 4 °C)   Resp 16   Ht 5' 9" (1 753 m)   Wt 86 2 kg (190 lb)   SpO2 97%   BMI 28 06 kg/m²          Physical Exam   Constitutional: He is oriented to person, place, and time  He appears well-developed and well-nourished  No distress  Pleasant, mildly overweight, congested-sounding, mildly ill-appearing 79-year-old male who is awake alert  Accompanied by his wife today   HENT:   Head: Normocephalic and atraumatic  Right Ear: External ear normal    Left Ear: External ear normal    Mouth/Throat: Oropharyngeal exudate present  Patient has severe erythema to nasal mucosa and turbinates are some enlarged with a thick yellow to green nasal discharge, erythema to posterior airway and tonsillar pillars, thick yellow postnasal drip   Eyes: Pupils are equal, round, and reactive to light  Conjunctivae and EOM are normal  Right eye exhibits no discharge  Left eye exhibits no discharge  No scleral icterus  Neck: Normal range of motion  Neck supple  No JVD present  No tracheal deviation present  No thyromegaly present  Cardiovascular: Normal rate, regular rhythm, normal heart sounds and intact distal pulses  Exam reveals no gallop and no friction rub  No murmur heard    Patient's heart rate initially on evaluation was normal   Did increase somewhat to 103 beats per minute after treatment with albuterol   Pulmonary/Chest: Effort normal  No stridor  No respiratory distress  He has wheezes  He has no rales  He exhibits no tenderness  Patient on evaluation initially had decreased breath sounds both anteriorly and posteriorly with faint end-expiratory wheeze heard with forced expiration  Patient did undergo a treatment today in the office without acute overall via mini nebulizer  Patient did have dramatic improvement in his air flow postprocedure  Patient had elimination of wheezes after procedure   Abdominal: Soft  Bowel sounds are normal  He exhibits no distension and no mass  There is no tenderness  There is no rebound and no guarding  No hernia  Musculoskeletal: Normal range of motion  Lymphadenopathy:     He has no cervical adenopathy  Neurological: He is alert and oriented to person, place, and time  No cranial nerve deficit  Coordination normal    Skin: Skin is warm and dry  He is not diaphoretic  No erythema  No pallor  Psychiatric: He has a normal mood and affect  His behavior is normal  Thought content normal    Nursing note and vitals reviewed

## 2020-01-17 NOTE — ASSESSMENT & PLAN NOTE
Patient is here today for evaluation  States that he has been ill for approximately 1 week to 10 days  He states he 1st started with some nasal congestion and then chest congestion  He states he is having a large amount of thick yellow to greenish mucus from the nares, again coughing up a lot of green to yellow mucus  On evaluation patient has evidence with enlarged red turbinates and a thick yellow nasal discharge of a sinus infection  Patient was placed on Augmentin 500 mg p o  b i d  for 10 days and was told to take this medication with food keep well hydrated and call if not improving    Will  a humidifier to use in the bedroom especially

## 2020-01-22 ENCOUNTER — APPOINTMENT (OUTPATIENT)
Dept: LAB | Facility: HOSPITAL | Age: 70
End: 2020-01-22
Payer: COMMERCIAL

## 2020-01-27 ENCOUNTER — OFFICE VISIT (OUTPATIENT)
Dept: INTERNAL MEDICINE CLINIC | Facility: CLINIC | Age: 70
End: 2020-01-27
Payer: COMMERCIAL

## 2020-01-27 VITALS
DIASTOLIC BLOOD PRESSURE: 60 MMHG | SYSTOLIC BLOOD PRESSURE: 112 MMHG | HEART RATE: 86 BPM | HEIGHT: 69 IN | OXYGEN SATURATION: 98 % | TEMPERATURE: 97.9 F | BODY MASS INDEX: 28.14 KG/M2 | WEIGHT: 190 LBS

## 2020-01-27 DIAGNOSIS — E11.65 TYPE 2 DIABETES MELLITUS WITH HYPERGLYCEMIA, WITHOUT LONG-TERM CURRENT USE OF INSULIN (HCC): ICD-10-CM

## 2020-01-27 DIAGNOSIS — R19.5 OCCULT BLOOD IN STOOLS: Primary | ICD-10-CM

## 2020-01-27 DIAGNOSIS — J40 BRONCHITIS: ICD-10-CM

## 2020-01-27 DIAGNOSIS — I10 ESSENTIAL HYPERTENSION: ICD-10-CM

## 2020-01-27 PROCEDURE — 1036F TOBACCO NON-USER: CPT | Performed by: INTERNAL MEDICINE

## 2020-01-27 PROCEDURE — 99214 OFFICE O/P EST MOD 30 MIN: CPT | Performed by: INTERNAL MEDICINE

## 2020-01-27 NOTE — ASSESSMENT & PLAN NOTE
Recently seen in the office for acute bronchitis  Because of his underlying medical problems and severity of symptoms with presentation patient was placed on antibiotics  He states that he slowly improving  He states further that he is only using the albuterol inhaler intermittently  He does have a profession as a  that we do worry about disease to the lung  Patient was told if not continuing to improve to please call

## 2020-01-27 NOTE — ASSESSMENT & PLAN NOTE
Patient did perform finally a stool testing  Patient's stool test was positive for blood    Patient was informed that he needs to see a gastroenterologist regarding this and a consult has been sent to a gastroenterologist that he seen in the past

## 2020-01-27 NOTE — PROGRESS NOTES
Assessment/Plan:    Type 2 diabetes mellitus with hyperglycemia ([facility] )  We have already discussed the patient's recent lab testing  As noted his sugar showing good control  Patient will continue present medication evaluation and have a blood sugar, hemoglobin A1c checked with his next visit  Lab Results   Component Value Date    HGBA1C 6 3 12/09/2019       Bronchitis  Recently seen in the office for acute bronchitis  Because of his underlying medical problems and severity of symptoms with presentation patient was placed on antibiotics  He states that he slowly improving  He states further that he is only using the albuterol inhaler intermittently  He does have a profession as a  that we do worry about disease to the lung  Patient was told if not continuing to improve to please call  Hypertension  Blood pressure is controlled  Will continue to monitor his renal function and he will have this checked prior to his next visit  Occult blood in stools  Patient did perform finally a stool testing  Patient's stool test was positive for blood  Patient was informed that he needs to see a gastroenterologist regarding this and a consult has been sent to a gastroenterologist that he seen in the past        Diagnoses and all orders for this visit:    Occult blood in stools  -     Ambulatory referral to Gastroenterology; Future    Essential hypertension  -     Basic metabolic panel; Future    Type 2 diabetes mellitus with hyperglycemia, without long-term current use of insulin (HCC)  -     Hemoglobin A1C; Future    Bronchitis          Subjective:      Patient ID: Luis M Dumont is a 81 y o  male  62-year-old male with a history of hypertension, hyperlipidemia, diabetes mellitus type 2  Patient is here today for routine follow-up  Patient was recently seen in the office for an acute upper respiratory tract infection and was placed on antibiotics and states is improving    Patient also had recent stool testing performed which was positive and patient will need to be seen by a gastroenterologist for further workup possible EGD and colonoscopy  Otherwise 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  The following portions of the patient's history were reviewed and updated as appropriate: He  has a past medical history of Hyperlipidemia  He   Patient Active Problem List    Diagnosis Date Noted    Occult blood in stools 01/27/2020    Acute non-recurrent frontal sinusitis 01/17/2020    Bronchitis 01/17/2020    Overweight (BMI 25 0-29 9) 04/08/2019    Acute atopic conjunctivitis of both eyes 12/03/2018    Healthcare maintenance 09/07/2018    Hypertension 10/21/2016    Vitamin D deficiency 03/03/2015    Type 2 diabetes mellitus with hyperglycemia ([facility] ) 11/20/2013    Hyperlipidemia 09/18/2012     He  has a past surgical history that includes Appendectomy  His family history includes Diabetes in his father; Pancreatic cancer in his father; Parkinsonism in his mother  He  reports that he has quit smoking  He has never used smokeless tobacco  He reports that he drinks alcohol  His drug history is not on file    Current Outpatient Medications   Medication Sig Dispense Refill    albuterol (PROVENTIL HFA,VENTOLIN HFA) 90 mcg/act inhaler Inhale 2 puffs every 6 (six) hours as needed for wheezing or shortness of breath 1 Inhaler 5    amoxicillin-clavulanate (AUGMENTIN) 500-125 mg per tablet Take 1 tablet by mouth every 12 (twelve) hours for 10 days 20 tablet 0    atorvastatin (LIPITOR) 10 mg tablet Take 1 tablet (10 mg total) by mouth daily 90 tablet 0    ergocalciferol (VITAMIN D2) 50,000 units Take by mouth      fluticasone (FLONASE) 50 mcg/act nasal spray 1 spray into each nostril daily      losartan (COZAAR) 50 mg tablet TAKE 1 TABLET BY MOUTH EVERY DAY 90 tablet 2    losartan (COZAAR) 50 mg tablet Take 1 tablet (50 mg total) by mouth daily 90 tablet 0    metFORMIN (GLUCOPHAGE-XR) 500 mg 24 hr tablet Take 1 tablet (500 mg total) by mouth 2 (two) times a day with meals 180 tablet 3    tobramycin-dexamethasone (TOBRADEX) ophthalmic suspension Administer 1 drop to both eyes every 4 (four) hours while awake 5 mL 2     No current facility-administered medications for this visit  Current Outpatient Medications on File Prior to Visit   Medication Sig    albuterol (PROVENTIL HFA,VENTOLIN HFA) 90 mcg/act inhaler Inhale 2 puffs every 6 (six) hours as needed for wheezing or shortness of breath    amoxicillin-clavulanate (AUGMENTIN) 500-125 mg per tablet Take 1 tablet by mouth every 12 (twelve) hours for 10 days    atorvastatin (LIPITOR) 10 mg tablet Take 1 tablet (10 mg total) by mouth daily    ergocalciferol (VITAMIN D2) 50,000 units Take by mouth    fluticasone (FLONASE) 50 mcg/act nasal spray 1 spray into each nostril daily    losartan (COZAAR) 50 mg tablet TAKE 1 TABLET BY MOUTH EVERY DAY    losartan (COZAAR) 50 mg tablet Take 1 tablet (50 mg total) by mouth daily    metFORMIN (GLUCOPHAGE-XR) 500 mg 24 hr tablet Take 1 tablet (500 mg total) by mouth 2 (two) times a day with meals    tobramycin-dexamethasone (TOBRADEX) ophthalmic suspension Administer 1 drop to both eyes every 4 (four) hours while awake     No current facility-administered medications on file prior to visit  He has No Known Allergies       Review of Systems   Constitutional: Positive for activity change (As he improves physically he is slowly increasing his activity level)  Negative for appetite change, chills, diaphoresis, fatigue, fever and unexpected weight change  HENT: Positive for congestion  Negative for dental problem, drooling, ear discharge, ear pain, facial swelling, hearing loss, mouth sores, nosebleeds, postnasal drip, rhinorrhea, sinus pressure, sinus pain, sneezing, sore throat, tinnitus, trouble swallowing and voice change           Patient states that he is having less difficulties with nasal congestion   Eyes: Negative  Respiratory: Positive for cough ( patient states he still has a slight residual cough and this is slowly improving)  Negative for apnea, choking, chest tightness, shortness of breath, wheezing and stridor  Cardiovascular: Negative  Gastrointestinal: Negative  Fecal testing was positive   Endocrine: Negative  Genitourinary: Negative  Musculoskeletal: Negative  Skin: Negative  Allergic/Immunologic: Negative  Neurological: Negative  Hematological: Negative  Psychiatric/Behavioral: Negative  Objective:      /60   Pulse 86   Temp 97 9 °F (36 6 °C)   Ht 5' 9" (1 753 m)   Wt 86 2 kg (190 lb)   SpO2 98%   BMI 28 06 kg/m²          Physical Exam   Constitutional: He is oriented to person, place, and time  He appears well-developed and well-nourished  No distress  61-year-old male who is awake alert no acute distress and oriented x3   HENT:   Head: Normocephalic and atraumatic  Right Ear: External ear normal    Left Ear: External ear normal    Mouth/Throat: Oropharyngeal exudate present  Patient has a slight residual erythema to nasal mucosa, posterior airway with a thick whitish to clear postnasal drip   Eyes: Pupils are equal, round, and reactive to light  Conjunctivae and EOM are normal  Right eye exhibits no discharge  Left eye exhibits no discharge  No scleral icterus  Neck: Normal range of motion  Neck supple  No JVD present  No tracheal deviation present  No thyromegaly present  Cardiovascular: Normal rate, regular rhythm, normal heart sounds and intact distal pulses  Exam reveals no gallop and no friction rub  No murmur heard  Pulmonary/Chest: Effort normal  No stridor  No respiratory distress  He has no wheezes  He has no rales  He exhibits no tenderness     Patient does have some slightly decreased breath sounds anteriorly and posteriorly but no rales rhonchi or wheezes could be appreciated on exam   Abdominal: Soft  Bowel sounds are normal  He exhibits no distension and no mass  There is no tenderness  There is no rebound and no guarding  No hernia  The overweight   Musculoskeletal: Normal range of motion  He exhibits no edema or deformity  Lymphadenopathy:     He has no cervical adenopathy  Neurological: He is alert and oriented to person, place, and time  He displays normal reflexes  No cranial nerve deficit or sensory deficit  He exhibits normal muscle tone  Coordination normal    Skin: Skin is warm and dry  Capillary refill takes less than 2 seconds  No rash noted  He is not diaphoretic  No erythema  No pallor  Psychiatric: He has a normal mood and affect  His behavior is normal  Judgment and thought content normal    Nursing note and vitals reviewed

## 2020-01-27 NOTE — ASSESSMENT & PLAN NOTE
Blood pressure is controlled  Will continue to monitor his renal function and he will have this checked prior to his next visit

## 2020-01-27 NOTE — ASSESSMENT & PLAN NOTE
We have already discussed the patient's recent lab testing  As noted his sugar showing good control  Patient will continue present medication evaluation and have a blood sugar, hemoglobin A1c checked with his next visit    Lab Results   Component Value Date    HGBA1C 6 3 12/09/2019

## 2020-01-29 ENCOUNTER — TELEPHONE (OUTPATIENT)
Dept: INTERNAL MEDICINE CLINIC | Facility: CLINIC | Age: 70
End: 2020-01-29

## 2020-01-29 NOTE — TELEPHONE ENCOUNTER
Patient states that he did have an episode of blood in the stools which she states was a large amount yesterday  His bowels moved today and they are normal without any blood  We had already put in for consultation with GI physician and he suggest the call the office today to make an appointment to be seen    He was also told if continued bleeding and is perfuse, lightheadedness, dizziness he needs to go to the emergency room immediately for evaluation

## 2020-01-29 NOTE — TELEPHONE ENCOUNTER
Patient asked if you could call him back in regard to finding blood in his stool recently     Patient can be reached at [phone]

## 2020-01-30 ENCOUNTER — TELEPHONE (OUTPATIENT)
Dept: OTHER | Facility: OTHER | Age: 70
End: 2020-01-30

## 2020-01-30 NOTE — TELEPHONE ENCOUNTER
Pt would like a call back from the office RE a recommendation to a GI doctor for a colonoscopy  His old GI dr retired

## 2020-02-21 ENCOUNTER — OFFICE VISIT (OUTPATIENT)
Dept: GASTROENTEROLOGY | Facility: CLINIC | Age: 70
End: 2020-02-21
Payer: COMMERCIAL

## 2020-02-21 VITALS
TEMPERATURE: 96.6 F | BODY MASS INDEX: 27.99 KG/M2 | HEART RATE: 66 BPM | SYSTOLIC BLOOD PRESSURE: 148 MMHG | WEIGHT: 189 LBS | DIASTOLIC BLOOD PRESSURE: 85 MMHG | HEIGHT: 69 IN

## 2020-02-21 DIAGNOSIS — R19.5 OCCULT BLOOD IN STOOLS: ICD-10-CM

## 2020-02-21 PROCEDURE — 99243 OFF/OP CNSLTJ NEW/EST LOW 30: CPT | Performed by: INTERNAL MEDICINE

## 2020-02-21 PROCEDURE — 1160F RVW MEDS BY RX/DR IN RCRD: CPT | Performed by: INTERNAL MEDICINE

## 2020-02-21 RX ORDER — SODIUM, POTASSIUM,MAG SULFATES 17.5-3.13G
180 SOLUTION, RECONSTITUTED, ORAL ORAL ONCE
Qty: 180 ML | Refills: 0 | Status: SHIPPED | OUTPATIENT
Start: 2020-02-21 | End: 2020-12-14 | Stop reason: SDUPTHER

## 2020-02-21 NOTE — PATIENT INSTRUCTIONS
Colonoscopy scheduled 4/13/20 with Dr Zackery Donovan at [facility] and diabetic instructions given to pt during OV by Blayne SORENSEN

## 2020-02-21 NOTE — PROGRESS NOTES
[facility] - Outpatient Consultation  Lauren Spaulding 81 y o  male MRN: [id number]  Encounter: [id number]          ASSESSMENT AND PLAN:      1  Occult blood in stools    - Ambulatory referral to Gastroenterology  - Colonoscopy; Future  - SUPREP BOWEL PREP KIT 17 5-3 13-1 6 GM/177ML SOLN; Take 180 mL by mouth once for 1 dose  Dispense: 180 mL; Refill: 0    The rationale for colonoscopy with possible biopsy, possible polypectomy, with IV sedation as well as all risks, benefits, and alternatives were discussed with the patient in detail  Risks including but not limited to perforation, bleeding, need for blood transfusion or emergent surgery, and missed neoplasm were reviewed in detail with the patient  The patient demonstrates full understanding and wishes to proceed with the colonoscopy   ______________________________________________________________    HPI:  Lauren Spaulding is a 81y o  year old male who presents to the office for evaluation for colorectal cancer screening  Reported symptoms: blood in stools  Family history: Father was diagnosed with colon cancer when he as in his 46s  Previous colonoscopy: In August 2016; 2 polyps were removed  Repeat recommended in 5 years  No blood in stool  No weight loss  No family history of colon cancer  No change in bowel habits  They are not on blood thinners  REVIEW OF SYSTEMS:    CONSTITUTIONAL: Denies any fever, chills, rigors, and weight loss  HEENT: No earache or tinnitus  Denies hearing loss or visual disturbances  CARDIOVASCULAR: No chest pain or palpitations  RESPIRATORY: Denies any cough, hemoptysis, shortness of breath or dyspnea on exertion  GASTROINTESTINAL: As noted in the History of Present Illness  GENITOURINARY: No problems with urination  Denies any hematuria or dysuria  NEUROLOGIC: No dizziness or vertigo, denies headaches  MUSCULOSKELETAL: Denies any muscle or joint pain     SKIN: Denies skin rashes or itching  ENDOCRINE: Denies excessive thirst  Denies intolerance to heat or cold  PSYCHOSOCIAL: Denies depression or anxiety  Denies any recent memory loss  Historical Information   Past Medical History:   Diagnosis Date    Hyperlipidemia      Past Surgical History:   Procedure Laterality Date    APPENDECTOMY       Social History   Social History     Substance and Sexual Activity   Alcohol Use Yes    Comment: wine     Social History     Substance and Sexual Activity   Drug Use Never     Social History     Tobacco Use   Smoking Status Former Smoker    Types: Cigars   Smokeless Tobacco Never Used     Family History   Problem Relation Age of Onset    Parkinsonism Mother     Diabetes Father     Pancreatic cancer Father     Colon cancer Father        Meds/Allergies       Current Outpatient Medications:     atorvastatin (LIPITOR) 10 mg tablet    ergocalciferol (VITAMIN D2) 50,000 units    losartan (COZAAR) 50 mg tablet    metFORMIN (GLUCOPHAGE-XR) 500 mg 24 hr tablet    albuterol (PROVENTIL HFA,VENTOLIN HFA) 90 mcg/act inhaler    fluticasone (FLONASE) 50 mcg/act nasal spray    losartan (COZAAR) 50 mg tablet    tobramycin-dexamethasone (TOBRADEX) ophthalmic suspension    No Known Allergies        Objective     Blood pressure 148/85, pulse 66, temperature (!) 96 6 °F (35 9 °C), temperature source Tympanic, height 5' 9" (1 753 m), weight 85 7 kg (189 lb)  Body mass index is 27 91 kg/m²  PHYSICAL EXAM:      General Appearance:   Alert, cooperative, no distress   HEENT:   Normocephalic, atraumatic, anicteric  Neck:  Supple, symmetrical, trachea midline   Lungs:   Clear to auscultation bilaterally; no rales, rhonchi or wheezing; respirations unlabored    Heart[de-identified]   Regular rate and rhythm; no murmur, rub, or gallop     Abdomen:   Soft, non-tender, non-distended; normal bowel sounds; no masses, no organomegaly    Genitalia:   Deferred    Rectal:   Deferred    Extremities:  No cyanosis, clubbing or edema    Pulses:  2+ and symmetric    Skin:  No jaundice, rashes, or lesions    Lymph nodes:  No palpable cervical lymphadenopathy        Lab Results:   No visits with results within 1 Day(s) from this visit  Latest known visit with results is:   Appointment on 01/20/2020   Component Date Value    OCCULT BLD, FECAL IMMUNO* 01/22/2020 Positive*         Radiology Results:   No results found

## 2020-03-17 ENCOUNTER — TELEPHONE (OUTPATIENT)
Dept: GASTROENTEROLOGY | Facility: CLINIC | Age: 70
End: 2020-03-17

## 2020-03-17 NOTE — TELEPHONE ENCOUNTER
ELIKE and spoke with Naeem CUELLO for prior authorization for a colonoscopy  No auth required    Call ref #[id number]

## 2020-03-18 DIAGNOSIS — E78.01 FAMILIAL HYPERCHOLESTEROLEMIA: ICD-10-CM

## 2020-03-18 DIAGNOSIS — E11.65 TYPE 2 DIABETES MELLITUS WITH HYPERGLYCEMIA, WITHOUT LONG-TERM CURRENT USE OF INSULIN (HCC): ICD-10-CM

## 2020-03-18 DIAGNOSIS — I10 ESSENTIAL HYPERTENSION: ICD-10-CM

## 2020-03-19 RX ORDER — LOSARTAN POTASSIUM 50 MG/1
50 TABLET ORAL DAILY
Qty: 90 TABLET | Refills: 2 | Status: SHIPPED | OUTPATIENT
Start: 2020-03-19 | End: 2020-07-06 | Stop reason: SDUPTHER

## 2020-03-19 RX ORDER — ATORVASTATIN CALCIUM 10 MG/1
10 TABLET, FILM COATED ORAL DAILY
Qty: 90 TABLET | Refills: 0 | Status: SHIPPED | OUTPATIENT
Start: 2020-03-19 | End: 2020-07-06 | Stop reason: SDUPTHER

## 2020-03-19 RX ORDER — METFORMIN HYDROCHLORIDE 500 MG/1
500 TABLET, EXTENDED RELEASE ORAL 2 TIMES DAILY WITH MEALS
Qty: 180 TABLET | Refills: 3 | Status: SHIPPED | OUTPATIENT
Start: 2020-03-19 | End: 2020-07-06 | Stop reason: SDUPTHER

## 2020-03-25 ENCOUNTER — TELEPHONE (OUTPATIENT)
Dept: GASTROENTEROLOGY | Facility: CLINIC | Age: 70
End: 2020-03-25

## 2020-03-25 NOTE — TELEPHONE ENCOUNTER
Patient aware procedure for 04/13/20 has been cancelled   Patient is currently stuck in [place] and will call back at a later date to reschedule

## 2020-07-06 DIAGNOSIS — I10 ESSENTIAL HYPERTENSION: ICD-10-CM

## 2020-07-06 DIAGNOSIS — E11.65 TYPE 2 DIABETES MELLITUS WITH HYPERGLYCEMIA, WITHOUT LONG-TERM CURRENT USE OF INSULIN (HCC): ICD-10-CM

## 2020-07-06 DIAGNOSIS — E78.01 FAMILIAL HYPERCHOLESTEROLEMIA: ICD-10-CM

## 2020-07-06 PROCEDURE — 4010F ACE/ARB THERAPY RXD/TAKEN: CPT | Performed by: INTERNAL MEDICINE

## 2020-07-06 RX ORDER — LOSARTAN POTASSIUM 50 MG/1
50 TABLET ORAL DAILY
Qty: 90 TABLET | Refills: 2 | Status: SHIPPED | OUTPATIENT
Start: 2020-07-06 | End: 2021-04-01

## 2020-07-06 RX ORDER — ATORVASTATIN CALCIUM 10 MG/1
10 TABLET, FILM COATED ORAL DAILY
Qty: 90 TABLET | Refills: 0 | Status: SHIPPED | OUTPATIENT
Start: 2020-07-06 | End: 2020-10-05

## 2020-07-06 RX ORDER — METFORMIN HYDROCHLORIDE 500 MG/1
500 TABLET, EXTENDED RELEASE ORAL 2 TIMES DAILY WITH MEALS
Qty: 180 TABLET | Refills: 3 | Status: SHIPPED | OUTPATIENT
Start: 2020-07-06 | End: 2021-01-05 | Stop reason: SDUPTHER

## 2020-07-27 ENCOUNTER — APPOINTMENT (OUTPATIENT)
Dept: LAB | Facility: CLINIC | Age: 70
End: 2020-07-27
Payer: COMMERCIAL

## 2020-07-27 ENCOUNTER — TRANSCRIBE ORDERS (OUTPATIENT)
Dept: LAB | Facility: CLINIC | Age: 70
End: 2020-07-27

## 2020-07-27 ENCOUNTER — TELEPHONE (OUTPATIENT)
Dept: INTERNAL MEDICINE CLINIC | Facility: CLINIC | Age: 70
End: 2020-07-27

## 2020-07-27 DIAGNOSIS — Z12.11 COLON CANCER SCREENING: ICD-10-CM

## 2020-07-27 DIAGNOSIS — Z12.11 COLON CANCER SCREENING: Primary | ICD-10-CM

## 2020-07-27 LAB — HEMOCCULT STL QL IA: NEGATIVE

## 2020-07-27 PROCEDURE — G0328 FECAL BLOOD SCRN IMMUNOASSAY: HCPCS

## 2020-07-30 ENCOUNTER — APPOINTMENT (OUTPATIENT)
Dept: LAB | Facility: CLINIC | Age: 70
End: 2020-07-30
Payer: COMMERCIAL

## 2020-07-30 DIAGNOSIS — E11.65 TYPE 2 DIABETES MELLITUS WITH HYPERGLYCEMIA, WITHOUT LONG-TERM CURRENT USE OF INSULIN (HCC): ICD-10-CM

## 2020-07-30 DIAGNOSIS — I10 ESSENTIAL HYPERTENSION: ICD-10-CM

## 2020-07-30 LAB
ANION GAP SERPL CALCULATED.3IONS-SCNC: 4 MMOL/L (ref 4–13)
BUN SERPL-MCNC: 13 MG/DL (ref 5–25)
CALCIUM SERPL-MCNC: 9.3 MG/DL (ref 8.3–10.1)
CHLORIDE SERPL-SCNC: 108 MMOL/L (ref 100–108)
CO2 SERPL-SCNC: 29 MMOL/L (ref 21–32)
CREAT SERPL-MCNC: 1.04 MG/DL (ref 0.6–1.3)
EST. AVERAGE GLUCOSE BLD GHB EST-MCNC: 128 MG/DL
GFR SERPL CREATININE-BSD FRML MDRD: 74 ML/MIN/1.73SQ M
GLUCOSE P FAST SERPL-MCNC: 101 MG/DL (ref 65–99)
HBA1C MFR BLD: 6.1 %
POTASSIUM SERPL-SCNC: 4.1 MMOL/L (ref 3.5–5.3)
SODIUM SERPL-SCNC: 141 MMOL/L (ref 136–145)

## 2020-07-30 PROCEDURE — 36415 COLL VENOUS BLD VENIPUNCTURE: CPT

## 2020-07-30 PROCEDURE — 83036 HEMOGLOBIN GLYCOSYLATED A1C: CPT

## 2020-07-30 PROCEDURE — 80048 BASIC METABOLIC PNL TOTAL CA: CPT

## 2020-07-30 PROCEDURE — 3044F HG A1C LEVEL LT 7.0%: CPT | Performed by: INTERNAL MEDICINE

## 2020-08-13 ENCOUNTER — OFFICE VISIT (OUTPATIENT)
Dept: INTERNAL MEDICINE CLINIC | Facility: CLINIC | Age: 70
End: 2020-08-13
Payer: COMMERCIAL

## 2020-08-13 VITALS
WEIGHT: 187 LBS | OXYGEN SATURATION: 96 % | TEMPERATURE: 98 F | SYSTOLIC BLOOD PRESSURE: 132 MMHG | HEIGHT: 69 IN | BODY MASS INDEX: 27.7 KG/M2 | HEART RATE: 93 BPM | DIASTOLIC BLOOD PRESSURE: 70 MMHG

## 2020-08-13 DIAGNOSIS — I10 ESSENTIAL HYPERTENSION: ICD-10-CM

## 2020-08-13 DIAGNOSIS — Z00.00 HEALTHCARE MAINTENANCE: Primary | ICD-10-CM

## 2020-08-13 DIAGNOSIS — R19.5 OCCULT BLOOD IN STOOLS: ICD-10-CM

## 2020-08-13 DIAGNOSIS — Z12.5 PROSTATE CANCER SCREENING: ICD-10-CM

## 2020-08-13 DIAGNOSIS — E55.9 VITAMIN D DEFICIENCY: ICD-10-CM

## 2020-08-13 DIAGNOSIS — E11.65 TYPE 2 DIABETES MELLITUS WITH HYPERGLYCEMIA, WITHOUT LONG-TERM CURRENT USE OF INSULIN (HCC): ICD-10-CM

## 2020-08-13 DIAGNOSIS — E78.01 FAMILIAL HYPERCHOLESTEROLEMIA: ICD-10-CM

## 2020-08-13 DIAGNOSIS — E66.3 OVERWEIGHT: ICD-10-CM

## 2020-08-13 PROBLEM — H65.22 LEFT CHRONIC SEROUS OTITIS MEDIA: Status: ACTIVE | Noted: 2020-08-13

## 2020-08-13 PROCEDURE — 1160F RVW MEDS BY RX/DR IN RCRD: CPT | Performed by: INTERNAL MEDICINE

## 2020-08-13 PROCEDURE — 99214 OFFICE O/P EST MOD 30 MIN: CPT | Performed by: INTERNAL MEDICINE

## 2020-08-13 PROCEDURE — 3075F SYST BP GE 130 - 139MM HG: CPT | Performed by: INTERNAL MEDICINE

## 2020-08-13 PROCEDURE — 3008F BODY MASS INDEX DOCD: CPT | Performed by: INTERNAL MEDICINE

## 2020-08-13 PROCEDURE — 1036F TOBACCO NON-USER: CPT | Performed by: INTERNAL MEDICINE

## 2020-08-13 PROCEDURE — 3078F DIAST BP <80 MM HG: CPT | Performed by: INTERNAL MEDICINE

## 2020-08-13 PROCEDURE — 3725F SCREEN DEPRESSION PERFORMED: CPT | Performed by: INTERNAL MEDICINE

## 2020-08-13 PROCEDURE — 3044F HG A1C LEVEL LT 7.0%: CPT | Performed by: INTERNAL MEDICINE

## 2020-08-13 NOTE — ASSESSMENT & PLAN NOTE
Lab Results   Component Value Date    HGBA1C 6 1 (H) 07/30/2020    Patient does have a longstanding history of diabetes  He did have labs performed prior to the visit today we did discuss the results  Hemoglobin A1c remains at a level showing good control  Patient does follow-up on a regular basis by Ophthalmology, diabetic foot exam was performed today  With his next visit patient will be going for studies in including a repeat hemoglobin A1c, urine for microalbumin  We also will be following his renal function  Patient will continue present medication and we did discuss the importance of diet, watching his intake of concentrated sweets, simple carbohydrates and calories overall, remaining physically active

## 2020-08-13 NOTE — ASSESSMENT & PLAN NOTE
History of occult blood in the stools  Because this patient was seen by gastroenterology  Had been scheduled for an EGD and colonoscopy but this is been delayed because the COVID virus pandemic  Is been instructed to call them within the next few weeks to make, schedule an appointment for an EGD and colonoscopy

## 2020-08-13 NOTE — PROGRESS NOTES
Assessment/Plan:    Type 2 diabetes mellitus with hyperglycemia (HCC)    Lab Results   Component Value Date    HGBA1C 6 1 (H) 07/30/2020    Patient does have a longstanding history of diabetes  He did have labs performed prior to the visit today we did discuss the results  Hemoglobin A1c remains at a level showing good control  Patient does follow-up on a regular basis by Ophthalmology, diabetic foot exam was performed today  With his next visit patient will be going for studies in including a repeat hemoglobin A1c, urine for microalbumin  We also will be following his renal function  Patient will continue present medication and we did discuss the importance of diet, watching his intake of concentrated sweets, simple carbohydrates and calories overall, remaining physically active  Hypertension  History of hypertension  Patient remains on losartan 50 mg a day  His blood pressure showing adequate control  Again we will continue to monitor his renal function  Hyperlipidemia  Patient remains on a statin specifically atorvastatin for his hyperlipidemia  He admits he is not always perfect with his diet  His wife to does try to watch his intake of fats and cholesterol  Patient will continue present medication and surveillance  We will check a lipid profile with his next visit       Occult blood in stools  History of occult blood in the stools  Because this patient was seen by gastroenterology  Had been scheduled for an EGD and colonoscopy but this is been delayed because the COVID virus pandemic  Is been instructed to call them within the next few weeks to make, schedule an appointment for an EGD and colonoscopy  Vitamin D deficiency  History of vitamin-D deficiency  Patient states he does take vitamin-D supplements on a daily basis  We will check a level when he returns to the office in 4 months for physical     Overweight  With his BMI patient is considered overweight    He has had no severe and weight gain or loss since his last visit  We did discuss with the patient looking closely at his caloric intake in order to help lose weight  Increase his aerobic activity  Will check on thyroid studies with his next visit including comprehensive metabolic profile    Left chronic serous otitis media  Patient has a history of chronic serous otitis media to the left ear  Patient states occasionally he has a feeling of water in the ear especially with change in weather and during that he hotter months of the year  Evaluation patient does have a slight erythema to the lateral membrane on the left   He was told to start using Flonase nasal spray 1 spray to each nostril twice a day see if this helps  Not improving will arrange for an ears nose and throat evaluation       Diagnoses and all orders for this visit:    Healthcare maintenance  -     Comprehensive metabolic panel; Future  -     CBC and differential; Future  -     Lipid panel; Future  -     UA (URINE) with reflex to Scope; Future  -     TSH, 3rd generation with Free T4 reflex; Future    Type 2 diabetes mellitus with hyperglycemia, without long-term current use of insulin (HCC)  -     Comprehensive metabolic panel; Future  -     CBC and differential; Future  -     Lipid panel; Future  -     Hemoglobin A1C; Future  -     Microalbumin / creatinine urine ratio  -     TSH, 3rd generation with Free T4 reflex; Future    Familial hypercholesterolemia  -     Lipid panel; Future  -     TSH, 3rd generation with Free T4 reflex; Future    Vitamin D deficiency  -     Vitamin D 25 hydroxy; Future    Prostate cancer screening  -     PSA, Total Screen; Future    Essential hypertension    Occult blood in stools    Overweight          Subjective:      Patient ID: Bibiana Day is a 81 y o  male  Patient is a 82-year-old male history of hypertension hyperlipidemia, diabetes mellitus type 2  Patient is here today for routine follow-up    He did have labs performed prior to the visit today and we did discuss the results  Patient states in general he is doing well physically, only complaint is some occasional clogging of his left ear      The following portions of the patient's history were reviewed and updated as appropriate: He  has a past medical history of Hyperlipidemia  He   Patient Active Problem List    Diagnosis Date Noted    Left chronic serous otitis media 08/13/2020    Occult blood in stools 01/27/2020    Acute non-recurrent frontal sinusitis 01/17/2020    Bronchitis 01/17/2020    Overweight 04/08/2019    Acute atopic conjunctivitis of both eyes 12/03/2018    Healthcare maintenance 09/07/2018    Hypertension 10/21/2016    Vitamin D deficiency 03/03/2015    Type 2 diabetes mellitus with hyperglycemia ([facility] [address] ) 11/20/2013    Hyperlipidemia 09/18/2012     He  has a past surgical history that includes Appendectomy  His family history includes Colon cancer in his father; Diabetes in his father; Pancreatic cancer in his father; Parkinsonism in his mother  He  reports that he has quit smoking  His smoking use included cigars  He has never used smokeless tobacco  He reports current alcohol use  He reports that he does not use drugs    Current Outpatient Medications   Medication Sig Dispense Refill    albuterol (PROVENTIL HFA,VENTOLIN HFA) 90 mcg/act inhaler Inhale 2 puffs every 6 (six) hours as needed for wheezing or shortness of breath 1 Inhaler 5    atorvastatin (LIPITOR) 10 mg tablet Take 1 tablet (10 mg total) by mouth daily 90 tablet 0    ergocalciferol (VITAMIN D2) 50,000 units Take by mouth      fluticasone (FLONASE) 50 mcg/act nasal spray 1 spray into each nostril daily      losartan (COZAAR) 50 mg tablet Take 1 tablet (50 mg total) by mouth daily 90 tablet 0    losartan (COZAAR) 50 mg tablet Take 1 tablet (50 mg total) by mouth daily 90 tablet 2    metFORMIN (GLUCOPHAGE-XR) 500 mg 24 hr tablet Take 1 tablet (500 mg total) by mouth 2 (two) times a day with meals 180 tablet 3    tobramycin-dexamethasone (TOBRADEX) ophthalmic suspension Administer 1 drop to both eyes every 4 (four) hours while awake 5 mL 2    SUPREP BOWEL PREP KIT 17 5-3 13-1 6 GM/177ML SOLN Take 180 mL by mouth once for 1 dose 180 mL 0     No current facility-administered medications for this visit  Current Outpatient Medications on File Prior to Visit   Medication Sig    albuterol (PROVENTIL HFA,VENTOLIN HFA) 90 mcg/act inhaler Inhale 2 puffs every 6 (six) hours as needed for wheezing or shortness of breath    atorvastatin (LIPITOR) 10 mg tablet Take 1 tablet (10 mg total) by mouth daily    ergocalciferol (VITAMIN D2) 50,000 units Take by mouth    fluticasone (FLONASE) 50 mcg/act nasal spray 1 spray into each nostril daily    losartan (COZAAR) 50 mg tablet Take 1 tablet (50 mg total) by mouth daily    losartan (COZAAR) 50 mg tablet Take 1 tablet (50 mg total) by mouth daily    metFORMIN (GLUCOPHAGE-XR) 500 mg 24 hr tablet Take 1 tablet (500 mg total) by mouth 2 (two) times a day with meals    tobramycin-dexamethasone (TOBRADEX) ophthalmic suspension Administer 1 drop to both eyes every 4 (four) hours while awake    SUPREP BOWEL PREP KIT 17 5-3 13-1 6 GM/177ML SOLN Take 180 mL by mouth once for 1 dose     No current facility-administered medications on file prior to visit  He has No Known Allergies       Review of Systems   Constitutional: Negative  HENT: Positive for hearing loss (Some occasional clogging of his left ear slight decreased hearing in the warmer weather) and sinus pressure ( some frontal sinus pressure over the past few days)  Negative for congestion, dental problem, drooling, ear discharge, ear pain, facial swelling, mouth sores, nosebleeds, postnasal drip, rhinorrhea, sinus pain, sneezing, sore throat, tinnitus, trouble swallowing and voice change  Eyes: Negative  Respiratory: Negative  Cardiovascular: Negative  Gastrointestinal: Negative  Endocrine: Negative  Genitourinary: Negative  Musculoskeletal: Negative  Skin: Negative  Allergic/Immunologic: Negative  Neurological: Negative  Hematological: Negative  Psychiatric/Behavioral: Negative  Objective:      /70   Pulse 93   Temp 98 °F (36 7 °C)   Ht 5' 9" (1 753 m)   Wt 84 8 kg (187 lb)   SpO2 96%   BMI 27 62 kg/m²          Physical Exam  Vitals signs and nursing note reviewed  Constitutional:       General: He is not in acute distress  Appearance: Normal appearance  He is not ill-appearing, toxic-appearing or diaphoretic  Comments: Pleasant, articulate, healthy-appearing 51-year-old male who is awake alert no acute distress and oriented x3   HENT:      Head: Normocephalic and atraumatic  Comments: Patient does have a slight injection to the tympanic membrane on the left, slight bulging  Right Ear: Tympanic membrane, ear canal and external ear normal  There is no impacted cerumen  Left Ear: Ear canal and external ear normal  There is no impacted cerumen  Nose: Nose normal  No congestion or rhinorrhea  Mouth/Throat:      Mouth: Mucous membranes are moist       Pharynx: Oropharynx is clear  No oropharyngeal exudate or posterior oropharyngeal erythema  Eyes:      General: No scleral icterus  Right eye: No discharge  Left eye: No discharge  Extraocular Movements: Extraocular movements intact  Conjunctiva/sclera: Conjunctivae normal       Pupils: Pupils are equal, round, and reactive to light  Neck:      Musculoskeletal: Normal range of motion and neck supple  No neck rigidity or muscular tenderness  Vascular: No carotid bruit  Cardiovascular:      Rate and Rhythm: Normal rate and regular rhythm  Pulses: Normal pulses  Heart sounds: Normal heart sounds  No murmur  No friction rub  No gallop  Pulmonary:      Effort: Pulmonary effort is normal  No respiratory distress        Breath sounds: Normal breath sounds  No stridor  No wheezing, rhonchi or rales  Chest:      Chest wall: No tenderness  Abdominal:      General: Bowel sounds are normal  There is no distension  Palpations: There is no mass  Tenderness: There is no abdominal tenderness  There is no right CVA tenderness, left CVA tenderness, guarding or rebound  Hernia: No hernia is present  Musculoskeletal: Normal range of motion  General: No swelling, tenderness, deformity or signs of injury  Right lower leg: No edema  Left lower leg: No edema  Lymphadenopathy:      Cervical: No cervical adenopathy  Skin:     General: Skin is warm and dry  Capillary Refill: Capillary refill takes less than 2 seconds  Coloration: Skin is not jaundiced or pale  Findings: No bruising, erythema, lesion or rash  Neurological:      General: No focal deficit present  Mental Status: He is alert and oriented to person, place, and time  Mental status is at baseline  Cranial Nerves: No cranial nerve deficit  Sensory: No sensory deficit  Motor: No weakness  Coordination: Coordination normal       Gait: Gait normal       Deep Tendon Reflexes: Reflexes normal    Psychiatric:         Mood and Affect: Mood normal          Behavior: Behavior normal          Thought Content: Thought content normal          Judgment: Judgment normal          BMI Counseling: Body mass index is 27 62 kg/m²  The BMI is above normal  Nutrition recommendations include reducing portion sizes, decreasing overall calorie intake, 3-5 servings of fruits/vegetables daily, moderation in carbohydrate intake, increasing intake of lean protein, reducing intake of saturated fat and trans fat and reducing intake of cholesterol  Exercise recommendations include moderate aerobic physical activity for 150 minutes/week

## 2020-08-13 NOTE — ASSESSMENT & PLAN NOTE
History of vitamin-D deficiency  Patient states he does take vitamin-D supplements on a daily basis    We will check a level when he returns to the office in 4 months for physical

## 2020-08-13 NOTE — ASSESSMENT & PLAN NOTE
Patient has a history of chronic serous otitis media to the left ear  Patient states occasionally he has a feeling of water in the ear especially with change in weather and during that he hotter months of the year  Evaluation patient does have a slight erythema to the lateral membrane on the left   He was told to start using Flonase nasal spray 1 spray to each nostril twice a day see if this helps    Not improving will arrange for an ears nose and throat evaluation

## 2020-08-13 NOTE — ASSESSMENT & PLAN NOTE
Patient remains on a statin specifically atorvastatin for his hyperlipidemia  He admits he is not always perfect with his diet  His wife to does try to watch his intake of fats and cholesterol  Patient will continue present medication and surveillance  We will check a lipid profile with his next visit  Brooks Lew

## 2020-08-13 NOTE — ASSESSMENT & PLAN NOTE
With his BMI patient is considered overweight  He has had no severe and weight gain or loss since his last visit  We did discuss with the patient looking closely at his caloric intake in order to help lose weight  Increase his aerobic activity    Will check on thyroid studies with his next visit including comprehensive metabolic profile

## 2020-08-13 NOTE — PATIENT INSTRUCTIONS
Calorie Counting Diet   WHAT YOU NEED TO KNOW:   What is a calorie counting diet? It is a meal plan based on counting calories each day to reach a healthy body weight  You will need to eat fewer calories if you are trying to lose weight  Weight loss may decrease your risk for certain health problems or improve your health if you have health problems  Some of these health problems include heart disease, high blood pressure, and diabetes  What foods should I avoid? Your dietitian will tell you if you need to avoid certain foods based on your body weight and health condition  You may need to avoid high-fat foods if you are at risk for or have heart disease  You may need to eat fewer foods from the breads and starches food group if you have diabetes  How many calories are in foods? The following is a list of foods and drinks with the approximate number of calories in each  Check the food label to find the exact number of calories  A dietitian can tell you how many calories you should have from each food group each day    · Carbohydrate:      ¨ ½ of a 3-inch bagel, 1 slice of bread, or ½ of a hamburger bun or hot dog bun (80)    ¨ 1 (8-inch) flour tortilla or ½ cup of cooked rice (100)    ¨ 1 (6-inch) corn tortilla (80)    ¨ 1 (6-inch) pancake or 1 cup of bran flakes cereal (110)    ¨ ½ cup of cooked cereal (80)    ¨ ½ cup of cooked pasta (85)    ¨ 1 ounce of pretzels (100)    ¨ 3 cups of air-popped popcorn without butter or oil (80)    · Dairy:      ¨ 1 cup of skim or 1% milk (90)    ¨ 1 cup of 2% milk (120)    ¨ 1 cup of whole milk (160)    ¨ 1 cup of 2% chocolate milk (220)    ¨ 1 ounce of low-fat cheese with 3 grams of fat per ounce (70)    ¨ 1 ounce of cheddar cheese (114)    ¨ ½ cup of 1% fat cottage cheese (80)    ¨ 1 cup of plain or sugar-free, fat-free yogurt (90)    · Protein foods:      ¨ 3 ounces of fish (not breaded or fried) (95)    ¨ 3 ounces of breaded, fried fish (195)    ¨ ¾ cup of tuna canned in water (105)    ¨ 3 ounces of chicken breast without skin (105)    ¨ 1 fried chicken breast with skin (350)    ¨ ¼ cup of fat free egg substitute (40)    ¨ 1 large egg (75)    ¨ 3 ounces of lean beef or pork (165)    ¨ 3 ounces of fried pork chop or ham (185)    ¨ ½ cup of cooked dried beans, such as kidney, walker, lentils, or navy (115)    ¨ 3 ounces of bologna or lunch meat (225)    ¨ 2 links of breakfast sausage (140)    · Vegetables:      ¨ ½ cup of sliced mushrooms (10)    ¨ 1 cup of salad greens, such as lettuce, spinach, or hanny (15)    ¨ ½ cup of steamed asparagus (20)    ¨ ½ cup of cooked summer squash, zucchini squash, or green or wax beans (25)    ¨ 1 cup of broccoli or cauliflower florets, or 1 medium tomato (25)    ¨ 1 large raw carrot or ½ cup of cooked carrots (40)    ¨ ? of a medium cucumber or 1 stalk of celery (5)    ¨ 1 small baked potato (160)    ¨ 1 cup of breaded, fried vegetables (230)    · Fruit:      ¨ 1 (6-inch) banana (55)     ¨ ½ of a 4-inch grapefruit (55)    ¨ 15 grapes (60)    ¨ 1 medium orange or apple (70)    ¨ 1 large peach (65)    ¨ 1 cup of fresh pineapple chunks (75)    ¨ 1 cup of melon cubes (50)    ¨ 1¼ cups of whole strawberries (45)    ¨ ½ cup of fruit canned in juice (55)    ¨ ½ cup of fruit canned in heavy syrup (110)    ¨ ?  cup of raisins (130)    ¨ ½ cup of unsweetened fruit juice (60)    ¨ ½ cup of grape, cranberry, or prune juice (90)    · Fat:      ¨ 10 peanuts or 2 teaspoons of peanut butter (55)    ¨ 2 tablespoons of avocado or 1 tablespoon of regular salad dressing (45)    ¨ 2 slices of friedman (90)    ¨ 1 teaspoon of oil, such as safflower, canola, corn, or olive oil (45)    ¨ 2 teaspoons of low-fat margarine, or 1 tablespoon of low-fat mayonnaise (50)    ¨ 1 teaspoon of regular margarine (40)    ¨ 1 tablespoon of regular mayonnaise (135)    ¨ 1 tablespoon of cream cheese or 2 tablespoons of low-fat cream cheese (45)    ¨ 2 tablespoons of vegetable shortening (215)    · Dessert and sweets:      ¨ 8 animal crackers or 5 vanilla wafers (80)    ¨ 1 frozen fruit juice bar (80)    ¨ ½ cup of ice milk or low-fat frozen yogurt (90)    ¨ ½ cup of sherbet or sorbet (125)    ¨ ½ cup of sugar-free pudding or custard (60)    ¨ ½ cup of ice cream (140)    ¨ ½ cup of pudding or custard (175)    ¨ 1 (2-inch) square chocolate brownie (185)    · Combination foods:      ¨ Bean burrito made with an 8-inch tortilla, without cheese (275)    ¨ Chicken breast sandwich with lettuce and tomato (325)    ¨ 1 cup of chicken noodle soup (60)    ¨ 1 beef taco (175)    ¨ Regular hamburger with lettuce and tomato (310)    ¨ Regular cheeseburger with lettuce and tomato (410)     ¨ ¼ of a 12-inch cheese pizza (280)    ¨ Fried fish sandwich with lettuce and tomato (425)    ¨ Hot dog and bun (275)    ¨ 1½ cups of macaroni and cheese (310)    ¨ Taco salad with a fried tortilla shell (870)    · Low-calorie foods:      ¨ 1 tablespoon of ketchup or 1 tablespoon of fat free sour cream (15)    ¨ 1 teaspoon of mustard (5)    ¨ ¼ cup of salsa (20)    ¨ 1 large dill pickle (15)    ¨ 1 tablespoon of fat free salad dressing (10)    ¨ 2 teaspoons of low-sugar, light jam or jelly, or 1 tablespoon of sugar-free syrup (15)    ¨ 1 sugar-free popsicle (15)    ¨ 1 cup of club soda, seltzer water, or diet soda (0)  CARE AGREEMENT:   You have the right to help plan your care  Discuss treatment options with your caregivers to decide what care you want to receive  You always have the right to refuse treatment  The above information is an  only  It is not intended as medical advice for individual conditions or treatments  Talk to your doctor, nurse or pharmacist before following any medical regimen to see if it is safe and effective for you  © 2017 [employer] Information is for End User's use only and may not be sold, redistributed or otherwise used for commercial purposes   All illustrations and images included in CareNotes® are the copyrighted property of [employer]  or [employer]

## 2020-08-13 NOTE — ASSESSMENT & PLAN NOTE
History of hypertension  Patient remains on losartan 50 mg a day  His blood pressure showing adequate control  Again we will continue to monitor his renal function

## 2020-09-23 ENCOUNTER — IMMUNIZATIONS (OUTPATIENT)
Dept: INTERNAL MEDICINE CLINIC | Facility: CLINIC | Age: 70
End: 2020-09-23
Payer: COMMERCIAL

## 2020-09-23 DIAGNOSIS — Z23 NEED FOR INFLUENZA VACCINATION: Primary | ICD-10-CM

## 2020-09-23 PROCEDURE — 90471 IMMUNIZATION ADMIN: CPT

## 2020-09-23 PROCEDURE — 90662 IIV NO PRSV INCREASED AG IM: CPT

## 2020-10-05 DIAGNOSIS — E11.65 TYPE 2 DIABETES MELLITUS WITH HYPERGLYCEMIA, WITHOUT LONG-TERM CURRENT USE OF INSULIN (HCC): ICD-10-CM

## 2020-10-05 DIAGNOSIS — E78.01 FAMILIAL HYPERCHOLESTEROLEMIA: ICD-10-CM

## 2020-10-05 RX ORDER — ATORVASTATIN CALCIUM 10 MG/1
TABLET, FILM COATED ORAL
Qty: 90 TABLET | Refills: 0 | Status: SHIPPED | OUTPATIENT
Start: 2020-10-05 | End: 2021-01-05 | Stop reason: SDUPTHER

## 2020-10-07 ENCOUNTER — TELEPHONE (OUTPATIENT)
Dept: INTERNAL MEDICINE CLINIC | Facility: CLINIC | Age: 70
End: 2020-10-07

## 2020-10-08 DIAGNOSIS — E11.65 TYPE 2 DIABETES MELLITUS WITH HYPERGLYCEMIA, WITHOUT LONG-TERM CURRENT USE OF INSULIN (HCC): Primary | ICD-10-CM

## 2020-10-13 ENCOUNTER — TELEPHONE (OUTPATIENT)
Dept: INTERNAL MEDICINE CLINIC | Facility: CLINIC | Age: 70
End: 2020-10-13

## 2020-10-13 ENCOUNTER — HOSPITAL ENCOUNTER (EMERGENCY)
Facility: HOSPITAL | Age: 70
Discharge: HOME | End: 2020-10-13
Attending: EMERGENCY MEDICINE
Payer: COMMERCIAL

## 2020-10-13 ENCOUNTER — APPOINTMENT (EMERGENCY)
Dept: RADIOLOGY | Facility: HOSPITAL | Age: 70
End: 2020-10-13
Payer: COMMERCIAL

## 2020-10-13 ENCOUNTER — OFFICE VISIT (OUTPATIENT)
Dept: URGENT CARE | Age: 70
End: 2020-10-13

## 2020-10-13 VITALS
HEART RATE: 104 BPM | TEMPERATURE: 97.4 F | RESPIRATION RATE: 18 BRPM | DIASTOLIC BLOOD PRESSURE: 80 MMHG | SYSTOLIC BLOOD PRESSURE: 138 MMHG | OXYGEN SATURATION: 92 %

## 2020-10-13 VITALS
HEART RATE: 77 BPM | TEMPERATURE: 98.7 F | SYSTOLIC BLOOD PRESSURE: 155 MMHG | RESPIRATION RATE: 18 BRPM | HEIGHT: 69 IN | DIASTOLIC BLOOD PRESSURE: 99 MMHG | BODY MASS INDEX: 27.62 KG/M2 | OXYGEN SATURATION: 93 %

## 2020-10-13 DIAGNOSIS — J44.1 COPD EXACERBATION (HCC): Primary | ICD-10-CM

## 2020-10-13 DIAGNOSIS — R06.02 SOB (SHORTNESS OF BREATH): Primary | ICD-10-CM

## 2020-10-13 LAB
ANION GAP SERPL CALCULATED.3IONS-SCNC: 4 MMOL/L (ref 4–13)
ATRIAL RATE: 69 BPM
BASOPHILS # BLD AUTO: 0.07 THOUSANDS/ΜL (ref 0–0.1)
BASOPHILS NFR BLD AUTO: 1 % (ref 0–1)
BUN SERPL-MCNC: 10 MG/DL (ref 5–25)
CALCIUM SERPL-MCNC: 9.4 MG/DL (ref 8.3–10.1)
CHLORIDE SERPL-SCNC: 110 MMOL/L (ref 100–108)
CO2 SERPL-SCNC: 29 MMOL/L (ref 21–32)
CREAT SERPL-MCNC: 0.96 MG/DL (ref 0.6–1.3)
EOSINOPHIL # BLD AUTO: 0.41 THOUSAND/ΜL (ref 0–0.61)
EOSINOPHIL NFR BLD AUTO: 6 % (ref 0–6)
ERYTHROCYTE [DISTWIDTH] IN BLOOD BY AUTOMATED COUNT: 13.8 % (ref 11.6–15.1)
GFR SERPL CREATININE-BSD FRML MDRD: 81 ML/MIN/1.73SQ M
GLUCOSE SERPL-MCNC: 115 MG/DL (ref 65–140)
HCT VFR BLD AUTO: 46.1 % (ref 36.5–49.3)
HGB BLD-MCNC: 14.9 G/DL (ref 12–17)
IMM GRANULOCYTES # BLD AUTO: 0.01 THOUSAND/UL (ref 0–0.2)
IMM GRANULOCYTES NFR BLD AUTO: 0 % (ref 0–2)
LYMPHOCYTES # BLD AUTO: 1.79 THOUSANDS/ΜL (ref 0.6–4.47)
LYMPHOCYTES NFR BLD AUTO: 24 % (ref 14–44)
MCH RBC QN AUTO: 30.3 PG (ref 26.8–34.3)
MCHC RBC AUTO-ENTMCNC: 32.3 G/DL (ref 31.4–37.4)
MCV RBC AUTO: 94 FL (ref 82–98)
MONOCYTES # BLD AUTO: 0.53 THOUSAND/ΜL (ref 0.17–1.22)
MONOCYTES NFR BLD AUTO: 7 % (ref 4–12)
NEUTROPHILS # BLD AUTO: 4.59 THOUSANDS/ΜL (ref 1.85–7.62)
NEUTS SEG NFR BLD AUTO: 62 % (ref 43–75)
NRBC BLD AUTO-RTO: 0 /100 WBCS
P AXIS: 71 DEGREES
PLATELET # BLD AUTO: 245 THOUSANDS/UL (ref 149–390)
PMV BLD AUTO: 9.9 FL (ref 8.9–12.7)
POTASSIUM SERPL-SCNC: 4 MMOL/L (ref 3.5–5.3)
PR INTERVAL: 184 MS
QRS AXIS: 76 DEGREES
QRSD INTERVAL: 90 MS
QT INTERVAL: 364 MS
QTC INTERVAL: 390 MS
RBC # BLD AUTO: 4.91 MILLION/UL (ref 3.88–5.62)
SARS-COV-2 RNA RESP QL NAA+PROBE: NEGATIVE
SODIUM SERPL-SCNC: 143 MMOL/L (ref 136–145)
T WAVE AXIS: 72 DEGREES
TROPONIN I SERPL-MCNC: <0.02 NG/ML
VENTRICULAR RATE: 69 BPM
WBC # BLD AUTO: 7.4 THOUSAND/UL (ref 4.31–10.16)

## 2020-10-13 PROCEDURE — U0003 INFECTIOUS AGENT DETECTION BY NUCLEIC ACID (DNA OR RNA); SEVERE ACUTE RESPIRATORY SYNDROME CORONAVIRUS 2 (SARS-COV-2) (CORONAVIRUS DISEASE [COVID-19]), AMPLIFIED PROBE TECHNIQUE, MAKING USE OF HIGH THROUGHPUT TECHNOLOGIES AS DESCRIBED BY CMS-2020-01-R: HCPCS | Performed by: EMERGENCY MEDICINE

## 2020-10-13 PROCEDURE — 71045 X-RAY EXAM CHEST 1 VIEW: CPT

## 2020-10-13 PROCEDURE — 84484 ASSAY OF TROPONIN QUANT: CPT | Performed by: EMERGENCY MEDICINE

## 2020-10-13 PROCEDURE — 80048 BASIC METABOLIC PNL TOTAL CA: CPT | Performed by: EMERGENCY MEDICINE

## 2020-10-13 PROCEDURE — 94640 AIRWAY INHALATION TREATMENT: CPT

## 2020-10-13 PROCEDURE — 99285 EMERGENCY DEPT VISIT HI MDM: CPT

## 2020-10-13 PROCEDURE — 99285 EMERGENCY DEPT VISIT HI MDM: CPT | Performed by: EMERGENCY MEDICINE

## 2020-10-13 PROCEDURE — 94760 N-INVAS EAR/PLS OXIMETRY 1: CPT

## 2020-10-13 PROCEDURE — 93010 ELECTROCARDIOGRAM REPORT: CPT | Performed by: INTERNAL MEDICINE

## 2020-10-13 PROCEDURE — 96374 THER/PROPH/DIAG INJ IV PUSH: CPT

## 2020-10-13 PROCEDURE — 36415 COLL VENOUS BLD VENIPUNCTURE: CPT | Performed by: EMERGENCY MEDICINE

## 2020-10-13 PROCEDURE — 93005 ELECTROCARDIOGRAM TRACING: CPT

## 2020-10-13 PROCEDURE — 85025 COMPLETE CBC W/AUTO DIFF WBC: CPT | Performed by: EMERGENCY MEDICINE

## 2020-10-13 RX ORDER — SODIUM CHLORIDE 9 MG/ML
3 INJECTION INTRAVENOUS
Status: DISCONTINUED | OUTPATIENT
Start: 2020-10-13 | End: 2020-10-13 | Stop reason: HOSPADM

## 2020-10-13 RX ORDER — METHYLPREDNISOLONE SODIUM SUCCINATE 125 MG/2ML
125 INJECTION, POWDER, LYOPHILIZED, FOR SOLUTION INTRAMUSCULAR; INTRAVENOUS ONCE
Status: COMPLETED | OUTPATIENT
Start: 2020-10-13 | End: 2020-10-13

## 2020-10-13 RX ORDER — SODIUM CHLORIDE FOR INHALATION 0.9 %
3 VIAL, NEBULIZER (ML) INHALATION ONCE
Status: COMPLETED | OUTPATIENT
Start: 2020-10-13 | End: 2020-10-13

## 2020-10-13 RX ORDER — PREDNISONE 20 MG/1
40 TABLET ORAL DAILY
Qty: 8 TABLET | Refills: 0 | Status: SHIPPED | OUTPATIENT
Start: 2020-10-14 | End: 2020-10-18

## 2020-10-13 RX ADMIN — ISODIUM CHLORIDE 3 ML: 0.03 SOLUTION RESPIRATORY (INHALATION) at 16:00

## 2020-10-13 RX ADMIN — METHYLPREDNISOLONE SODIUM SUCCINATE 125 MG: 125 INJECTION, POWDER, FOR SOLUTION INTRAMUSCULAR; INTRAVENOUS at 16:22

## 2020-10-13 RX ADMIN — ALBUTEROL SULFATE 10 MG: 2.5 SOLUTION RESPIRATORY (INHALATION) at 16:00

## 2020-10-13 RX ADMIN — IPRATROPIUM BROMIDE 1 MG: 0.5 SOLUTION RESPIRATORY (INHALATION) at 16:00

## 2020-10-15 ENCOUNTER — OFFICE VISIT (OUTPATIENT)
Dept: INTERNAL MEDICINE CLINIC | Facility: CLINIC | Age: 70
End: 2020-10-15
Payer: COMMERCIAL

## 2020-10-15 VITALS
SYSTOLIC BLOOD PRESSURE: 140 MMHG | HEIGHT: 69 IN | TEMPERATURE: 98.1 F | BODY MASS INDEX: 27.99 KG/M2 | HEART RATE: 87 BPM | DIASTOLIC BLOOD PRESSURE: 78 MMHG | OXYGEN SATURATION: 96 % | WEIGHT: 189 LBS

## 2020-10-15 DIAGNOSIS — E11.65 TYPE 2 DIABETES MELLITUS WITH HYPERGLYCEMIA, WITHOUT LONG-TERM CURRENT USE OF INSULIN (HCC): ICD-10-CM

## 2020-10-15 DIAGNOSIS — I10 ESSENTIAL HYPERTENSION: ICD-10-CM

## 2020-10-15 DIAGNOSIS — J40 BRONCHITIS: Primary | ICD-10-CM

## 2020-10-15 DIAGNOSIS — J01.10 ACUTE NON-RECURRENT FRONTAL SINUSITIS: ICD-10-CM

## 2020-10-15 PROCEDURE — 99214 OFFICE O/P EST MOD 30 MIN: CPT | Performed by: INTERNAL MEDICINE

## 2020-10-15 PROCEDURE — 1036F TOBACCO NON-USER: CPT | Performed by: INTERNAL MEDICINE

## 2020-10-15 PROCEDURE — 3078F DIAST BP <80 MM HG: CPT | Performed by: INTERNAL MEDICINE

## 2020-10-15 PROCEDURE — 1160F RVW MEDS BY RX/DR IN RCRD: CPT | Performed by: INTERNAL MEDICINE

## 2020-10-15 RX ORDER — AMOXICILLIN AND CLAVULANATE POTASSIUM 500; 125 MG/1; MG/1
1 TABLET, FILM COATED ORAL EVERY 12 HOURS SCHEDULED
Qty: 20 TABLET | Refills: 0 | Status: SHIPPED | OUTPATIENT
Start: 2020-10-15 | End: 2020-10-25

## 2020-12-09 ENCOUNTER — LAB (OUTPATIENT)
Dept: LAB | Age: 70
End: 2020-12-09
Payer: COMMERCIAL

## 2020-12-09 DIAGNOSIS — E55.9 VITAMIN D DEFICIENCY: ICD-10-CM

## 2020-12-09 DIAGNOSIS — E11.65 TYPE 2 DIABETES MELLITUS WITH HYPERGLYCEMIA, WITHOUT LONG-TERM CURRENT USE OF INSULIN (HCC): ICD-10-CM

## 2020-12-09 DIAGNOSIS — Z00.00 HEALTHCARE MAINTENANCE: ICD-10-CM

## 2020-12-09 DIAGNOSIS — Z12.5 PROSTATE CANCER SCREENING: ICD-10-CM

## 2020-12-09 DIAGNOSIS — E78.01 FAMILIAL HYPERCHOLESTEROLEMIA: ICD-10-CM

## 2020-12-09 LAB
25(OH)D3 SERPL-MCNC: 49.4 NG/ML (ref 30–100)
ALBUMIN SERPL BCP-MCNC: 4 G/DL (ref 3.5–5)
ALP SERPL-CCNC: 62 U/L (ref 46–116)
ALT SERPL W P-5'-P-CCNC: 40 U/L (ref 12–78)
ANION GAP SERPL CALCULATED.3IONS-SCNC: 5 MMOL/L (ref 4–13)
AST SERPL W P-5'-P-CCNC: 23 U/L (ref 5–45)
BASOPHILS # BLD AUTO: 0.07 THOUSANDS/ΜL (ref 0–0.1)
BASOPHILS NFR BLD AUTO: 1 % (ref 0–1)
BILIRUB SERPL-MCNC: 0.87 MG/DL (ref 0.2–1)
BILIRUB UR QL STRIP: NEGATIVE
BUN SERPL-MCNC: 11 MG/DL (ref 5–25)
CALCIUM SERPL-MCNC: 9.4 MG/DL (ref 8.3–10.1)
CHLORIDE SERPL-SCNC: 109 MMOL/L (ref 100–108)
CHOLEST SERPL-MCNC: 153 MG/DL (ref 50–200)
CLARITY UR: NORMAL
CO2 SERPL-SCNC: 28 MMOL/L (ref 21–32)
COLOR UR: NORMAL
CREAT SERPL-MCNC: 1.02 MG/DL (ref 0.6–1.3)
CREAT UR-MCNC: 299 MG/DL
EOSINOPHIL # BLD AUTO: 0.32 THOUSAND/ΜL (ref 0–0.61)
EOSINOPHIL NFR BLD AUTO: 5 % (ref 0–6)
ERYTHROCYTE [DISTWIDTH] IN BLOOD BY AUTOMATED COUNT: 13.6 % (ref 11.6–15.1)
EST. AVERAGE GLUCOSE BLD GHB EST-MCNC: 134 MG/DL
GFR SERPL CREATININE-BSD FRML MDRD: 75 ML/MIN/1.73SQ M
GLUCOSE P FAST SERPL-MCNC: 132 MG/DL (ref 65–99)
GLUCOSE UR STRIP-MCNC: NEGATIVE MG/DL
HBA1C MFR BLD: 6.3 %
HCT VFR BLD AUTO: 44.4 % (ref 36.5–49.3)
HDLC SERPL-MCNC: 61 MG/DL
HGB BLD-MCNC: 14.7 G/DL (ref 12–17)
HGB UR QL STRIP.AUTO: NEGATIVE
IMM GRANULOCYTES # BLD AUTO: 0.01 THOUSAND/UL (ref 0–0.2)
IMM GRANULOCYTES NFR BLD AUTO: 0 % (ref 0–2)
KETONES UR STRIP-MCNC: NEGATIVE MG/DL
LDLC SERPL CALC-MCNC: 76 MG/DL (ref 0–100)
LEUKOCYTE ESTERASE UR QL STRIP: NEGATIVE
LYMPHOCYTES # BLD AUTO: 1.78 THOUSANDS/ΜL (ref 0.6–4.47)
LYMPHOCYTES NFR BLD AUTO: 28 % (ref 14–44)
MCH RBC QN AUTO: 31 PG (ref 26.8–34.3)
MCHC RBC AUTO-ENTMCNC: 33.1 G/DL (ref 31.4–37.4)
MCV RBC AUTO: 94 FL (ref 82–98)
MICROALBUMIN UR-MCNC: 16.3 MG/L (ref 0–20)
MICROALBUMIN/CREAT 24H UR: 5 MG/G CREATININE (ref 0–30)
MONOCYTES # BLD AUTO: 0.5 THOUSAND/ΜL (ref 0.17–1.22)
MONOCYTES NFR BLD AUTO: 8 % (ref 4–12)
NEUTROPHILS # BLD AUTO: 3.64 THOUSANDS/ΜL (ref 1.85–7.62)
NEUTS SEG NFR BLD AUTO: 58 % (ref 43–75)
NITRITE UR QL STRIP: NEGATIVE
NONHDLC SERPL-MCNC: 92 MG/DL
NRBC BLD AUTO-RTO: 0 /100 WBCS
PH UR STRIP.AUTO: 5.5 [PH]
PLATELET # BLD AUTO: 219 THOUSANDS/UL (ref 149–390)
PMV BLD AUTO: 9.9 FL (ref 8.9–12.7)
POTASSIUM SERPL-SCNC: 4.4 MMOL/L (ref 3.5–5.3)
PROT SERPL-MCNC: 7 G/DL (ref 6.4–8.2)
PROT UR STRIP-MCNC: NEGATIVE MG/DL
PSA SERPL-MCNC: 1.1 NG/ML (ref 0–4)
RBC # BLD AUTO: 4.74 MILLION/UL (ref 3.88–5.62)
SODIUM SERPL-SCNC: 142 MMOL/L (ref 136–145)
SP GR UR STRIP.AUTO: 1.03 (ref 1–1.03)
TRIGL SERPL-MCNC: 82 MG/DL
TSH SERPL DL<=0.05 MIU/L-ACNC: 1.65 UIU/ML (ref 0.36–3.74)
UROBILINOGEN UR QL STRIP.AUTO: 0.2 E.U./DL
WBC # BLD AUTO: 6.32 THOUSAND/UL (ref 4.31–10.16)

## 2020-12-09 PROCEDURE — 81003 URINALYSIS AUTO W/O SCOPE: CPT

## 2020-12-09 PROCEDURE — 82043 UR ALBUMIN QUANTITATIVE: CPT | Performed by: INTERNAL MEDICINE

## 2020-12-09 PROCEDURE — 82306 VITAMIN D 25 HYDROXY: CPT

## 2020-12-09 PROCEDURE — 3061F NEG MICROALBUMINURIA REV: CPT | Performed by: INTERNAL MEDICINE

## 2020-12-09 PROCEDURE — 80053 COMPREHEN METABOLIC PANEL: CPT

## 2020-12-09 PROCEDURE — 84443 ASSAY THYROID STIM HORMONE: CPT

## 2020-12-09 PROCEDURE — 36415 COLL VENOUS BLD VENIPUNCTURE: CPT

## 2020-12-09 PROCEDURE — 85025 COMPLETE CBC W/AUTO DIFF WBC: CPT

## 2020-12-09 PROCEDURE — 82570 ASSAY OF URINE CREATININE: CPT | Performed by: INTERNAL MEDICINE

## 2020-12-09 PROCEDURE — 80061 LIPID PANEL: CPT

## 2020-12-09 PROCEDURE — G0103 PSA SCREENING: HCPCS

## 2020-12-09 PROCEDURE — 83036 HEMOGLOBIN GLYCOSYLATED A1C: CPT

## 2020-12-09 PROCEDURE — 3044F HG A1C LEVEL LT 7.0%: CPT | Performed by: INTERNAL MEDICINE

## 2020-12-14 ENCOUNTER — OFFICE VISIT (OUTPATIENT)
Dept: INTERNAL MEDICINE CLINIC | Facility: CLINIC | Age: 70
End: 2020-12-14
Payer: COMMERCIAL

## 2020-12-14 VITALS
OXYGEN SATURATION: 96 % | SYSTOLIC BLOOD PRESSURE: 136 MMHG | WEIGHT: 190 LBS | HEIGHT: 69 IN | BODY MASS INDEX: 28.14 KG/M2 | DIASTOLIC BLOOD PRESSURE: 84 MMHG | HEART RATE: 73 BPM | TEMPERATURE: 98.4 F

## 2020-12-14 DIAGNOSIS — I10 ESSENTIAL HYPERTENSION: Primary | ICD-10-CM

## 2020-12-14 DIAGNOSIS — E55.9 VITAMIN D DEFICIENCY: ICD-10-CM

## 2020-12-14 DIAGNOSIS — Z00.00 HEALTHCARE MAINTENANCE: ICD-10-CM

## 2020-12-14 DIAGNOSIS — E11.65 TYPE 2 DIABETES MELLITUS WITH HYPERGLYCEMIA, WITHOUT LONG-TERM CURRENT USE OF INSULIN (HCC): ICD-10-CM

## 2020-12-14 DIAGNOSIS — E78.01 FAMILIAL HYPERCHOLESTEROLEMIA: ICD-10-CM

## 2020-12-14 PROCEDURE — 3075F SYST BP GE 130 - 139MM HG: CPT | Performed by: INTERNAL MEDICINE

## 2020-12-14 PROCEDURE — 1036F TOBACCO NON-USER: CPT | Performed by: INTERNAL MEDICINE

## 2020-12-14 PROCEDURE — 3008F BODY MASS INDEX DOCD: CPT | Performed by: INTERNAL MEDICINE

## 2020-12-14 PROCEDURE — 1160F RVW MEDS BY RX/DR IN RCRD: CPT | Performed by: INTERNAL MEDICINE

## 2020-12-14 PROCEDURE — 99214 OFFICE O/P EST MOD 30 MIN: CPT | Performed by: INTERNAL MEDICINE

## 2020-12-14 PROCEDURE — 3079F DIAST BP 80-89 MM HG: CPT | Performed by: INTERNAL MEDICINE

## 2021-01-05 DIAGNOSIS — E11.65 TYPE 2 DIABETES MELLITUS WITH HYPERGLYCEMIA, WITHOUT LONG-TERM CURRENT USE OF INSULIN (HCC): ICD-10-CM

## 2021-01-05 DIAGNOSIS — E78.01 FAMILIAL HYPERCHOLESTEROLEMIA: ICD-10-CM

## 2021-01-05 RX ORDER — ATORVASTATIN CALCIUM 10 MG/1
10 TABLET, FILM COATED ORAL DAILY
Qty: 90 TABLET | Refills: 3 | Status: SHIPPED | OUTPATIENT
Start: 2021-01-05 | End: 2021-12-09 | Stop reason: SDUPTHER

## 2021-01-05 RX ORDER — METFORMIN HYDROCHLORIDE 500 MG/1
500 TABLET, EXTENDED RELEASE ORAL 2 TIMES DAILY WITH MEALS
Qty: 180 TABLET | Refills: 3 | Status: SHIPPED | OUTPATIENT
Start: 2021-01-05 | End: 2021-12-09 | Stop reason: SDUPTHER

## 2021-01-12 DIAGNOSIS — E11.65 TYPE 2 DIABETES MELLITUS WITH HYPERGLYCEMIA, WITHOUT LONG-TERM CURRENT USE OF INSULIN (HCC): Primary | ICD-10-CM

## 2021-01-12 RX ORDER — BLOOD SUGAR DIAGNOSTIC
STRIP MISCELLANEOUS
Qty: 100 EACH | Refills: 3 | Status: SHIPPED | OUTPATIENT
Start: 2021-01-12 | End: 2021-12-09 | Stop reason: SDUPTHER

## 2021-03-10 DIAGNOSIS — Z23 ENCOUNTER FOR IMMUNIZATION: ICD-10-CM

## 2021-03-24 ENCOUNTER — OFFICE VISIT (OUTPATIENT)
Dept: INTERNAL MEDICINE CLINIC | Facility: CLINIC | Age: 71
End: 2021-03-24
Payer: COMMERCIAL

## 2021-03-24 VITALS
OXYGEN SATURATION: 98 % | SYSTOLIC BLOOD PRESSURE: 110 MMHG | DIASTOLIC BLOOD PRESSURE: 70 MMHG | HEIGHT: 69 IN | WEIGHT: 190 LBS | BODY MASS INDEX: 28.14 KG/M2 | HEART RATE: 83 BPM

## 2021-03-24 DIAGNOSIS — H65.22 LEFT CHRONIC SEROUS OTITIS MEDIA: Primary | ICD-10-CM

## 2021-03-24 PROCEDURE — 3078F DIAST BP <80 MM HG: CPT | Performed by: INTERNAL MEDICINE

## 2021-03-24 PROCEDURE — 1160F RVW MEDS BY RX/DR IN RCRD: CPT | Performed by: INTERNAL MEDICINE

## 2021-03-24 PROCEDURE — 3008F BODY MASS INDEX DOCD: CPT | Performed by: INTERNAL MEDICINE

## 2021-03-24 PROCEDURE — 99213 OFFICE O/P EST LOW 20 MIN: CPT | Performed by: INTERNAL MEDICINE

## 2021-03-24 PROCEDURE — 1036F TOBACCO NON-USER: CPT | Performed by: INTERNAL MEDICINE

## 2021-03-24 PROCEDURE — 3074F SYST BP LT 130 MM HG: CPT | Performed by: INTERNAL MEDICINE

## 2021-03-24 NOTE — PROGRESS NOTES
Assessment/Plan:    Left chronic serous otitis media  Patient is here today for evaluation  Still complaining of feeling that his left ear is clogged  Some increasing tinnitus over the past week  again to the left ear  Has no decreased auditory acuity  No nasal congestion or sore throat  Some pain to the area the TMJ on the left accompanying this  On evaluation patient has tympanic membranes are both intact  Patient does have a small amount of cerumen that stock to the top of the tympanic membrane on the left no erythema no infusion no obstruction  Patient does have a very slight erythema diffusely to nasal mucosa and a slight postnasal drip  I see no specific problem that I can address at this point time I do not feel that he needs to be on any antibiotics  Patient will have evaluation by in ears nose and throat physician and a consult has been sent  Patient was told to continue with his Flonase nasal spray until that time and if he is unable to get an appointment acutely to please call if worsening symptoms  Diagnoses and all orders for this visit:    Left chronic serous otitis media  -     Ambulatory Referral to Otolaryngology; Future          Subjective:      Patient ID: Kwame Vega is a 78 y o  male  53-year-old male history of multiple problems as outlined previously  Patient is here today for evaluation complaining of fullness left ear, increased tinnitus, some pain to the TMJ the left  No nasal congestion sore throat fever or chills  No cough      The following portions of the patient's history were reviewed and updated as appropriate: He  has a past medical history of Hyperlipidemia    He   Patient Active Problem List    Diagnosis Date Noted    Left chronic serous otitis media 08/13/2020    Occult blood in stools 01/27/2020    Acute non-recurrent frontal sinusitis 01/17/2020    Bronchitis 01/17/2020    Overweight 04/08/2019    Acute atopic conjunctivitis of both eyes 12/03/2018 Flavio [facility] maintenance 09/07/2018    Hypertension 10/21/2016    Vitamin D deficiency 03/03/2015    [facility] with [facility] 11/20/2013    Hyperlipidemia 09/18/2012     He  has a past surgical history that includes Appendectomy  His family history includes Colon cancer in his father; Diabetes in his father; Pancreatic cancer in his father; Parkinsonism in his mother  He  reports that he has quit smoking  His smoking use included cigars  He has never used smokeless tobacco  He reports current alcohol use  He reports that he does not use drugs  Current Outpatient Medications   Medication Sig Dispense Refill    albuterol (PROVENTIL HFA,VENTOLIN HFA) 90 mcg/act inhaler Inhale 2 puffs every 6 (six) hours as needed for wheezing or shortness of breath 1 Inhaler 5    atorvastatin (LIPITOR) 10 mg tablet Take 1 tablet (10 mg total) by mouth daily 90 tablet 3    ergocalciferol (VITAMIN D2) 50,000 units Take by mouth      fluticasone (FLONASE) 50 mcg/act nasal spray 1 spray into each nostril daily      losartan (COZAAR) 50 mg tablet Take 1 tablet (50 mg total) by mouth daily 90 tablet 2    metFORMIN (GLUCOPHAGE-XR) 500 mg 24 hr tablet Take 1 tablet (500 mg total) by mouth 2 (two) times a day with meals 180 tablet 3    OneTouch Ultra test strip TEST ONCE A  each 3     No current facility-administered medications for this visit        Current Outpatient Medications on File Prior to Visit   Medication Sig    albuterol (PROVENTIL HFA,VENTOLIN HFA) 90 mcg/act inhaler Inhale 2 puffs every 6 (six) hours as needed for wheezing or shortness of breath    atorvastatin (LIPITOR) 10 mg tablet Take 1 tablet (10 mg total) by mouth daily    ergocalciferol (VITAMIN D2) 50,000 units Take by mouth    fluticasone (FLONASE) 50 mcg/act nasal spray 1 spray into each nostril daily    losartan (COZAAR) 50 mg tablet Take 1 tablet (50 mg total) by mouth daily    metFORMIN (GLUCOPHAGE-XR) 500 mg 24 hr tablet Take 1 tablet (500 mg total) by mouth 2 (two) times a day with meals    OneTouch Ultra test strip TEST ONCE A DAY     No current facility-administered medications on file prior to visit  He has No Known Allergies       Review of Systems   Constitutional: Negative  HENT: Positive for ear pain and tinnitus  Negative for congestion, dental problem, drooling, ear discharge, facial swelling, hearing loss, mouth sores, nosebleeds, postnasal drip, rhinorrhea, sinus pressure, sinus pain, sneezing, sore throat, trouble swallowing and voice change  Eyes: Negative  Respiratory: Negative  Cardiovascular: Negative  Gastrointestinal: Negative  Endocrine: Negative  Genitourinary: Negative  Musculoskeletal: Negative  Skin: Negative  Allergic/Immunologic: Negative  Neurological: Negative  Hematological: Negative  Psychiatric/Behavioral: Negative  Objective:      /70   Pulse 83   Ht 5' 9" (1 753 m)   Wt 86 2 kg (190 lb)   SpO2 98%   BMI 28 06 kg/m²          Physical Exam  Vitals signs and nursing note reviewed  Constitutional:       General: He is not in acute distress  Appearance: Normal appearance  He is not ill-appearing, toxic-appearing or diaphoretic  Comments: Pleasant articulate 51-year-old male who is awake alert no acute distress oriented x3   HENT:      Head: Normocephalic and atraumatic  Ears:      Comments: Patient's tympanic membranes bilaterally intact  No effusion was seen and no erythema  Patient does have a very small piece of cerumen stuck to the top of the tympanic membrane on the left  Nose: Rhinorrhea present  No congestion  Comments: Some slight diffuse erythema to nasal mucosa with clear nasal discharge     Mouth/Throat:      Mouth: Mucous membranes are moist       Pharynx: Oropharynx is clear  No oropharyngeal exudate or posterior oropharyngeal erythema  Eyes:      General: No scleral icterus          Right eye: No discharge  Left eye: No discharge  Extraocular Movements: Extraocular movements intact  Conjunctiva/sclera: Conjunctivae normal       Pupils: Pupils are equal, round, and reactive to light  Neck:      Musculoskeletal: Normal range of motion and neck supple  Cardiovascular:      Rate and Rhythm: Normal rate and regular rhythm  Pulmonary:      Effort: Pulmonary effort is normal       Breath sounds: Normal breath sounds  Skin:     General: Skin is warm and dry  Neurological:      General: No focal deficit present  Mental Status: He is alert and oriented to person, place, and time  Mental status is at baseline  Psychiatric:         Mood and Affect: Mood normal          Behavior: Behavior normal          Thought Content:  Thought content normal          Judgment: Judgment normal

## 2021-03-24 NOTE — ASSESSMENT & PLAN NOTE
Patient is here today for evaluation  Still complaining of feeling that his left ear is clogged  Some increasing tinnitus over the past week  again to the left ear  Has no decreased auditory acuity  No nasal congestion or sore throat  Some pain to the area the TMJ on the left accompanying this  On evaluation patient has tympanic membranes are both intact  Patient does have a small amount of cerumen that stock to the top of the tympanic membrane on the left no erythema no infusion no obstruction  Patient does have a very slight erythema diffusely to nasal mucosa and a slight postnasal drip  I see no specific problem that I can address at this point time I do not feel that he needs to be on any antibiotics  Patient will have evaluation by in ears nose and throat physician and a consult has been sent  Patient was told to continue with his Flonase nasal spray until that time and if he is unable to get an appointment acutely to please call if worsening symptoms

## 2021-04-01 DIAGNOSIS — I10 ESSENTIAL HYPERTENSION: ICD-10-CM

## 2021-04-01 PROCEDURE — 4010F ACE/ARB THERAPY RXD/TAKEN: CPT | Performed by: INTERNAL MEDICINE

## 2021-04-01 RX ORDER — LOSARTAN POTASSIUM 50 MG/1
TABLET ORAL
Qty: 90 TABLET | Refills: 2 | Status: SHIPPED | OUTPATIENT
Start: 2021-04-01 | End: 2021-12-09 | Stop reason: SDUPTHER

## 2021-05-14 LAB — HBA1C MFR BLD HPLC: 6.5 %

## 2021-05-14 PROCEDURE — 3044F HG A1C LEVEL LT 7.0%: CPT | Performed by: INTERNAL MEDICINE

## 2021-05-17 ENCOUNTER — TELEPHONE (OUTPATIENT)
Dept: INTERNAL MEDICINE CLINIC | Facility: CLINIC | Age: 71
End: 2021-05-17

## 2021-05-24 ENCOUNTER — OFFICE VISIT (OUTPATIENT)
Dept: INTERNAL MEDICINE CLINIC | Facility: CLINIC | Age: 71
End: 2021-05-24
Payer: COMMERCIAL

## 2021-05-24 VITALS
OXYGEN SATURATION: 96 % | WEIGHT: 192.6 LBS | DIASTOLIC BLOOD PRESSURE: 80 MMHG | TEMPERATURE: 97.2 F | HEIGHT: 69 IN | RESPIRATION RATE: 14 BRPM | BODY MASS INDEX: 28.53 KG/M2 | SYSTOLIC BLOOD PRESSURE: 126 MMHG | HEART RATE: 98 BPM

## 2021-05-24 DIAGNOSIS — Z12.11 COLON CANCER SCREENING: ICD-10-CM

## 2021-05-24 DIAGNOSIS — E78.01 FAMILIAL HYPERCHOLESTEROLEMIA: ICD-10-CM

## 2021-05-24 DIAGNOSIS — I10 ESSENTIAL HYPERTENSION: ICD-10-CM

## 2021-05-24 DIAGNOSIS — E11.65 TYPE 2 DIABETES MELLITUS WITH HYPERGLYCEMIA, WITHOUT LONG-TERM CURRENT USE OF INSULIN (HCC): ICD-10-CM

## 2021-05-24 DIAGNOSIS — E66.3 OVERWEIGHT: ICD-10-CM

## 2021-05-24 DIAGNOSIS — Z00.00 HEALTHCARE MAINTENANCE: ICD-10-CM

## 2021-05-24 DIAGNOSIS — R19.5 OCCULT BLOOD IN STOOLS: Primary | ICD-10-CM

## 2021-05-24 PROCEDURE — 1036F TOBACCO NON-USER: CPT | Performed by: INTERNAL MEDICINE

## 2021-05-24 PROCEDURE — 99397 PER PM REEVAL EST PAT 65+ YR: CPT | Performed by: INTERNAL MEDICINE

## 2021-05-24 PROCEDURE — 3725F SCREEN DEPRESSION PERFORMED: CPT | Performed by: INTERNAL MEDICINE

## 2021-05-24 PROCEDURE — 3079F DIAST BP 80-89 MM HG: CPT | Performed by: INTERNAL MEDICINE

## 2021-05-24 PROCEDURE — 1101F PT FALLS ASSESS-DOCD LE1/YR: CPT | Performed by: INTERNAL MEDICINE

## 2021-05-24 PROCEDURE — 3074F SYST BP LT 130 MM HG: CPT | Performed by: INTERNAL MEDICINE

## 2021-05-24 PROCEDURE — 1160F RVW MEDS BY RX/DR IN RCRD: CPT | Performed by: INTERNAL MEDICINE

## 2021-05-24 PROCEDURE — 3008F BODY MASS INDEX DOCD: CPT | Performed by: INTERNAL MEDICINE

## 2021-05-24 PROCEDURE — 3288F FALL RISK ASSESSMENT DOCD: CPT | Performed by: INTERNAL MEDICINE

## 2021-05-24 NOTE — ASSESSMENT & PLAN NOTE
With the patient's BMI he is considered overweight  We did discuss with the patient the importance of watching his caloric intake and making sure that he is participating in her regular exercise program in order to help with weight loss    He was told this is extremely important with his underlying disease processes including hypertension, hyperlipidemia and diabetes

## 2021-05-24 NOTE — ASSESSMENT & PLAN NOTE
Patient is here today for routine physical   Patient states he is feeling well and offers no new complaints or concerns  He states there were some difficult times emotionally with the recent death of his mother who was in hospice living with him and his wife for prolonged  Episode  Patient did have some labs performed prior to the visit today looking at his blood sugar control  Did have extensive labs approximately 6 months ago  Patient relates that he did have initial interview the gastroenterologist to prepare for colonoscopy but because of the COVID virus and make the procedure was canceled and he has not gone back for follow-up which she will do in the near future  Admits that he is not always watching his diet closely but is sugars have been controlled  Patient did undergo full physical examination including diabetic foot exam   Will be going for in the near future for repeat diabetic eye exam   Will be seen in the office approximately 4 months for re-evaluation and we will check a fasting blood sugar, hemoglobin A1c prior to that visit

## 2021-05-24 NOTE — ASSESSMENT & PLAN NOTE
Blood pressure is showing adequate control with present treatment  Patient will continue present medication and surveillance    We will continue to monitor his renal function

## 2021-05-24 NOTE — PROGRESS NOTES
Answers for HPI/ROS submitted by the patient on 5/21/2021   How would you rate your overall health?: very good  Compared to last year, how is your physical health?: same  In general, how satisfied are you with your life?: satisfied  Compared to last year, how is your eyesight?: same  Compared to last year, how is your hearing?: slightly worse  Compared to last year, how is your emotional/mental health?: much better  How often is anger a problem for you?: never, rarely  How often do you feel unusually tired/fatigued?: sometimes  In the past 7 days, how much pain have you experienced?: none  In the past 6 months, have you lost or gained 10 pounds without trying?: No  One or more falls in the last year: No  Do you have trouble with the stairs inside or outside your home?: No  Does your home have working smoke alarms?: Yes  Does your home have a carbon monoxide monitor?: Yes  Which safety hazards (if any) have you experienced in your home? Please select all that apply : none  How would you describe your current diet?  Please select all that apply : Regular, Diabetic, Low Cholesterol, No Added Salt  In addition to prescription medications, are you taking any over-the-counter supplements?: No  Can you manage your medications?: Yes  Are you currently taking any opioid medications?: No  Can you walk and transfer into and out of your bed and chair?: Yes  Can you dress and groom yourself?: Yes  Can you bathe or shower yourself?: Yes  Can you feed yourself?: Yes  Can you do your laundry/ housekeeping?: Yes  Can you manage your money, pay your bills, and track your expenses?: Yes  Can you make your own meals?: Yes  Can you do your own shopping?: Yes  Within the last 12 months, have you had any hospitalizations or Emergency Department visits?: Yes  If yes, how many times have you been hospitalized within the past year?: 1-2  Additional Comments: I was treated for COPD for 4 hours and released  Do you have a living will?: Yes  Do you have a Durable POA (Power of ) for healthcare decisions?: Yes  Do you have an Advanced Directive for end of life decisions?: Yes  How often have you used an illegal drug (including marijuana) or a prescription medication for non-medical reasons in the past year?: never  What is the typical number of drinks you consume in a day?: 3  What is the typical number of drinks you consume in a week?: 18  How often did you have a drink containing alcohol in the past year?: 4 or more times a week  How many drinks did you have on a typical day  when you were drinking in the past year?: 3 to 4  How often did you have 6 or more drinks on one occasion in the past year?: never  How often during the last year have you found that you were not able to stop drinking once you had started?: 3 - weekly  How often during the last year have you failed to do what was normally expected from you because of drinking?: 0 - never  How often during the last year have you needed a first drink in the morning to get yourself going after a heavy drinking session?: 0 - never  How often during the last year have you had a feeling of guilt or remorse after drinking?: 0 - never  How often during the last year have you been unable to remember what happened the night before because you had been drinking?: 0 - never  Have you or someone else been injured as a result of your drinking?: 0 - no  Has a relative or friend or a doctor or another health worker been concerned about your drinking or suggested you cut down?: 0 - no  Assessment/Plan:    Healthcare maintenance   Patient is here today for routine physical   Patient states he is feeling well and offers no new complaints or concerns  He states there were some difficult times emotionally with the recent death of his mother who was in hospice living with him and his wife for prolonged  Episode  Patient did have some labs performed prior to the visit today looking at his blood sugar control    Did have extensive labs approximately 6 months ago  Patient relates that he did have initial interview the gastroenterologist to prepare for colonoscopy but because of the COVID virus and make the procedure was canceled and he has not gone back for follow-up which she will do in the near future  Admits that he is not always watching his diet closely but is sugars have been controlled  Patient did undergo full physical examination including diabetic foot exam   Will be going for in the near future for repeat diabetic eye exam   Will be seen in the office approximately 4 months for re-evaluation and we will check a fasting blood sugar, hemoglobin A1c prior to that visit  Hyperlipidemia    Patient does have a history of hyperlipidemia  Remains on atorvastatin 10 mg daily  Again he does admit that he is not always perfect with his diet, intake of fats and cholesterol  We will continue to monitor his lipid profile and make changes to medication if needed in the future  Was told the importance of watching his intake of f    Hypertension   Blood pressure is showing adequate control with present treatment  Patient will continue present medication and surveillance  We will continue to monitor his renal function    Occult blood in stools   Again patient had a positive stool test and had arrange to have evaluation by Gastroenterology but never went through with the procedure  We will initiate another consult with Gastroenterology regarding this  Patient denies any black stools or blood in the stools    Overweight   With the patient's BMI he is considered overweight  We did discuss with the patient the importance of watching his caloric intake and making sure that he is participating in her regular exercise program in order to help with weight loss    He was told this is extremely important with his underlying disease processes including hypertension, hyperlipidemia and diabetes    Type 2 diabetes mellitus with hyperglycemia ([facility] )    Lab Results   Component Value Date    HGBA1C 6 5 05/14/2021    with some adjustments with medication patient's sugars have shown good control with present treatment  Patient will continue present medication  Instructed to have a repeat diabetic eye exam in the near future  Told to continue to monitor his caloric intake in order to help control his sugars, diabetic foot exam was performed today  We will check a fasting blood sugar, hemoglobin A1c with his next visit  Vitamin D deficiency    Patient does have a history of vitamin-D deficiencies  Will continue with his supplements as previously  Diagnoses and all orders for this visit:    Occult blood in stools  -     Ambulatory referral to Gastroenterology; Future    Essential hypertension  -     Basic metabolic panel; Future    Type 2 diabetes mellitus with hyperglycemia, without long-term current use of insulin (HCC)  -     Basic metabolic panel; Future  -     Hemoglobin A1C; Future    Healthcare maintenance    Overweight    Colon cancer screening  -     Ambulatory referral to Gastroenterology; Future    Familial hypercholesterolemia          Subjective:      Patient ID: Cheryl Hutson is a 78 y o  male  Patient is a 57-year-old male history of hypertension, hyperlipidemia, diabetes mellitus type 2  Patient is here today for routine physical   Did have labs performed prior to the visit today we did discuss results  Patient states generally mentally and physically he is doing well with no new complaints or concerns  The following portions of the patient's history were reviewed and updated as appropriate:   He  has a past medical history of Diabetes ([facility] ), Hyperlipidemia, and Hypertension    He   Patient Active Problem List    Diagnosis Date Noted    Left chronic serous otitis media 08/13/2020    Occult blood in stools 01/27/2020    Acute non-recurrent frontal sinusitis 01/17/2020    Bronchitis 01/17/2020    Overweight 04/08/2019    Acute atopic conjunctivitis of both eyes 12/03/2018    Healthcare maintenance 09/07/2018    Hypertension 10/21/2016    Vitamin D deficiency 03/03/2015    [facility] 11/20/2013    Hyperlipidemia 09/18/2012     He  has a past surgical history that includes Appendectomy  His family history includes Colon cancer in his father; Diabetes in his father; Hearing loss in his family; Hypertension in his family; Pancreatic cancer in his father; Parkinsonism in his mother  He  reports that he has quit smoking  His smoking use included cigars  He has never used smokeless tobacco  He reports current alcohol use of about 7 0 standard drinks of alcohol per week  He reports that he does not use drugs  Current Outpatient Medications   Medication Sig Dispense Refill    albuterol (PROVENTIL HFA,VENTOLIN HFA) 90 mcg/act inhaler Inhale 2 puffs every 6 (six) hours as needed for wheezing or shortness of breath 1 Inhaler 5    atorvastatin (LIPITOR) 10 mg tablet Take 1 tablet (10 mg total) by mouth daily 90 tablet 3    ergocalciferol (VITAMIN D2) 50,000 units Take by mouth      fluticasone (FLONASE) 50 mcg/act nasal spray 1 spray into each nostril daily      losartan (COZAAR) 50 mg tablet TAKE 1 TABLET BY MOUTH EVERY DAY 90 tablet 2    metFORMIN (GLUCOPHAGE-XR) 500 mg 24 hr tablet Take 1 tablet (500 mg total) by mouth 2 (two) times a day with meals 180 tablet 3    OneTouch Ultra test strip TEST ONCE A  each 3     No current facility-administered medications for this visit        Current Outpatient Medications on File Prior to Visit   Medication Sig    albuterol (PROVENTIL HFA,VENTOLIN HFA) 90 mcg/act inhaler Inhale 2 puffs every 6 (six) hours as needed for wheezing or shortness of breath    atorvastatin (LIPITOR) 10 mg tablet Take 1 tablet (10 mg total) by mouth daily    ergocalciferol (VITAMIN D2) 50,000 units Take by mouth    fluticasone (FLONASE) 50 mcg/act nasal spray 1 spray into each nostril daily    losartan (COZAAR) 50 mg tablet TAKE 1 TABLET BY MOUTH EVERY DAY    metFORMIN (GLUCOPHAGE-XR) 500 mg 24 hr tablet Take 1 tablet (500 mg total) by mouth 2 (two) times a day with meals    OneTouch Ultra test strip TEST ONCE A DAY     No current facility-administered medications on file prior to visit  He has No Known Allergies       Review of Systems   Constitutional: Negative  HENT: Negative  Eyes: Negative  Respiratory: Negative  Cardiovascular: Negative  Gastrointestinal: Negative  Endocrine: Negative  Genitourinary: Negative  Musculoskeletal: Negative  Skin: Negative  Allergic/Immunologic: Negative  Neurological: Negative  Hematological: Negative  Psychiatric/Behavioral: Negative  Objective:      /80   Pulse 98   Temp (!) 97 2 °F (36 2 °C) (Skin)   Resp 14   Ht 5' 9" (1 753 m)   Wt 87 4 kg (192 lb 9 6 oz)   SpO2 96%   BMI 28 44 kg/m²          Physical Exam  Vitals signs and nursing note reviewed  Constitutional:       General: He is not in acute distress  Appearance: Normal appearance  He is not ill-appearing, toxic-appearing or diaphoretic  Comments: Pleasant, healthy-appearing, articulate 30-year-old male who is awake alert no acute distress and oriented x3   HENT:      Head: Normocephalic and atraumatic  Right Ear: Tympanic membrane, ear canal and external ear normal  There is no impacted cerumen  Left Ear: Tympanic membrane, ear canal and external ear normal  There is no impacted cerumen  Nose: Nose normal  No congestion or rhinorrhea  Mouth/Throat:      Mouth: Mucous membranes are moist       Pharynx: Oropharynx is clear  No oropharyngeal exudate or posterior oropharyngeal erythema  Eyes:      General: No scleral icterus  Right eye: No discharge  Left eye: No discharge  Extraocular Movements: Extraocular movements intact  Conjunctiva/sclera: Conjunctivae normal       Pupils: Pupils are equal, round, and reactive to light  Neck:      Musculoskeletal: Normal range of motion and neck supple  No neck rigidity or muscular tenderness  Vascular: No carotid bruit  Cardiovascular:      Rate and Rhythm: Normal rate and regular rhythm  Pulses: Normal pulses  Heart sounds: Normal heart sounds  No murmur  No friction rub  No gallop  Pulmonary:      Effort: Pulmonary effort is normal  No respiratory distress  Breath sounds: Normal breath sounds  No stridor  No wheezing, rhonchi or rales  Chest:      Chest wall: No tenderness  Abdominal:      General: Bowel sounds are normal  There is no distension  Palpations: Abdomen is soft  There is no mass  Tenderness: There is no abdominal tenderness  There is no right CVA tenderness, left CVA tenderness, guarding or rebound  Hernia: No hernia is present  Comments: Overweight   Musculoskeletal: Normal range of motion  General: No swelling, tenderness, deformity or signs of injury  Right lower leg: No edema  Left lower leg: No edema  Lymphadenopathy:      Cervical: No cervical adenopathy  Skin:     General: Skin is warm and dry  Capillary Refill: Capillary refill takes less than 2 seconds  Coloration: Skin is not jaundiced or pale  Findings: No bruising, erythema, lesion or rash  Neurological:      General: No focal deficit present  Mental Status: He is alert and oriented to person, place, and time  Mental status is at baseline  Cranial Nerves: No cranial nerve deficit  Sensory: No sensory deficit  Motor: No weakness  Coordination: Coordination normal       Gait: Gait normal       Deep Tendon Reflexes: Reflexes normal    Psychiatric:         Mood and Affect: Mood normal          Behavior: Behavior normal          Thought Content:  Thought content normal          Judgment: Judgment normal  BMI Counseling: Body mass index is 28 44 kg/m²  The BMI is above normal  Nutrition recommendations include reducing portion sizes, decreasing overall calorie intake, 3-5 servings of fruits/vegetables daily, moderation in carbohydrate intake, increasing intake of lean protein, reducing intake of saturated fat and trans fat and reducing intake of cholesterol  Exercise recommendations include exercising 3-5 times per week

## 2021-05-24 NOTE — ASSESSMENT & PLAN NOTE
Lab Results   Component Value Date    HGBA1C 6 5 05/14/2021    with some adjustments with medication patient's sugars have shown good control with present treatment  Patient will continue present medication  Instructed to have a repeat diabetic eye exam in the near future  Told to continue to monitor his caloric intake in order to help control his sugars, diabetic foot exam was performed today  We will check a fasting blood sugar, hemoglobin A1c with his next visit

## 2021-05-24 NOTE — ASSESSMENT & PLAN NOTE
Patient does have a history of hyperlipidemia  Remains on atorvastatin 10 mg daily  Again he does admit that he is not always perfect with his diet, intake of fats and cholesterol  We will continue to monitor his lipid profile and make changes to medication if needed in the future    Was told the importance of watching his intake of f

## 2021-05-24 NOTE — ASSESSMENT & PLAN NOTE
Patient does have a history of vitamin-D deficiencies  Will continue with his supplements as previously

## 2021-05-24 NOTE — ASSESSMENT & PLAN NOTE
Again patient had a positive stool test and had arrange to have evaluation by Gastroenterology but never went through with the procedure  We will initiate another consult with Gastroenterology regarding this    Patient denies any black stools or blood in the stools

## 2021-05-24 NOTE — PROGRESS NOTES
Diabetic Foot Exam    Patient's shoes and socks removed  Right Foot/Ankle   Right Foot Inspection  Skin Exam: skin normal and skin intact no dry skin, no warmth, no callus, no erythema, no maceration, no abnormal color, no pre-ulcer, no ulcer and no callus                          Toe Exam: ROM and strength within normal limitsno swelling, no tenderness, erythema and  no right toe deformity  Sensory   Vibration: intact  Proprioception: intact   Monofilament testing: intact  Vascular  Capillary refills: < 3 seconds  The right DP pulse is 2+  The right PT pulse is 2+  Left Foot/Ankle  Left Foot Inspection  Skin Exam: skin normal and skin intactno dry skin, no warmth, no erythema, no maceration, normal color, no pre-ulcer, no ulcer and no callus                         Toe Exam: ROM and strength within normal limitsno swelling, no tenderness, no erythema and no left toe deformity                   Sensory   Vibration: intact  Proprioception: intact  Monofilament: intact  Vascular  Capillary refills: < 3 seconds  The left DP pulse is 2+  The left PT pulse is 2+  Assign Risk Category:  No deformity present; No loss of protective sensation;  No weak pulses       Risk: 0    Answers for HPI/ROS submitted by the patient on 5/21/2021   How would you rate your overall health?: very good  Compared to last year, how is your physical health?: same  In general, how satisfied are you with your life?: satisfied  Compared to last year, how is your eyesight?: same  Compared to last year, how is your hearing?: slightly worse  Compared to last year, how is your emotional/mental health?: much better  How often is anger a problem for you?: never, rarely  How often do you feel unusually tired/fatigued?: sometimes  In the past 7 days, how much pain have you experienced?: none  In the past 6 months, have you lost or gained 10 pounds without trying?: No  One or more falls in the last year: No  Do you have trouble with the stairs inside or outside your home?: No  Does your home have working smoke alarms?: Yes  Does your home have a carbon monoxide monitor?: Yes  Which safety hazards (if any) have you experienced in your home? Please select all that apply : none  How would you describe your current diet?  Please select all that apply : Regular, Diabetic, Low Cholesterol, No Added Salt  In addition to prescription medications, are you taking any over-the-counter supplements?: No  Can you manage your medications?: Yes  Are you currently taking any opioid medications?: No  Can you walk and transfer into and out of your bed and chair?: Yes  Can you dress and groom yourself?: Yes  Can you bathe or shower yourself?: Yes  Can you feed yourself?: Yes  Can you do your laundry/ housekeeping?: Yes  Can you manage your money, pay your bills, and track your expenses?: Yes  Can you make your own meals?: Yes  Can you do your own shopping?: Yes  Within the last 12 months, have you had any hospitalizations or Emergency Department visits?: Yes  If yes, how many times have you been hospitalized within the past year?: 1-2  Additional Comments: I was treated for COPD for 4 hours and released  Do you have a living will?: Yes  Do you have a Durable POA (Power of ) for healthcare decisions?: Yes  Do you have an Advanced Directive for end of life decisions?: Yes  How often have you used an illegal drug (including marijuana) or a prescription medication for non-medical reasons in the past year?: never  What is the typical number of drinks you consume in a day?: 3  What is the typical number of drinks you consume in a week?: 18  How often did you have a drink containing alcohol in the past year?: 4 or more times a week  How many drinks did you have on a typical day  when you were drinking in the past year?: 3 to 4  How often did you have 6 or more drinks on one occasion in the past year?: never  How often during the last year have you found that you were not able to stop drinking once you had started?: 3 - weekly  How often during the last year have you failed to do what was normally expected from you because of drinking?: 0 - never  How often during the last year have you needed a first drink in the morning to get yourself going after a heavy drinking session?: 0 - never  How often during the last year have you had a feeling of guilt or remorse after drinking?: 0 - never  How often during the last year have you been unable to remember what happened the night before because you had been drinking?: 0 - never  Have you or someone else been injured as a result of your drinking?: 0 - no  Has a relative or friend or a doctor or another health worker been concerned about your drinking or suggested you cut down?: 0 - no

## 2021-05-24 NOTE — PATIENT INSTRUCTIONS
Heart Healthy Diet   AMBULATORY CARE:   A heart healthy diet  is an eating plan low in unhealthy fats and sodium (salt)  The plan is high in healthy fats and fiber  A heart healthy diet helps improve your cholesterol levels and lowers your risk for heart disease and stroke  A dietitian will teach you how to read and understand food labels  Heart healthy diet guidelines to follow:   · Choose foods that contain healthy fats  ? Unsaturated fats  include monounsaturated and polyunsaturated fats  Unsaturated fat is found in foods such as soybean, canola, olive, corn, and safflower oils  It is also found in soft tub margarine that is made with liquid vegetable oil  ? Omega-3 fat  is found in certain fish, such as salmon, tuna, and trout, and in walnuts and flaxseed  Eat fish high in omega-3 fats at least 2 times a week  · Get 20 to 30 grams of fiber each day  Fruits, vegetables, whole-grain foods, and legumes (cooked beans) are good sources of fiber  · Limit or do not have unhealthy fats  ? Cholesterol  is found in animal foods, such as eggs and lobster, and in dairy products made from whole milk  Limit cholesterol to less than 200 mg each day  ? Saturated fat  is found in meats, such as friedman and hamburger  It is also found in chicken or turkey skin, whole milk, and butter  Limit saturated fat to less than 7% of your total daily calories  ? Trans fat  is found in packaged foods, such as potato chips and cookies  It is also in hard margarine, some fried foods, and shortening  Do not eat foods that contain trans fats  · Limit sodium as directed  You may be told to limit sodium to 2,000 to 2,300 mg each day  Choose low-sodium or no-salt-added foods  Add little or no salt to food you prepare  Use herbs and spices in place of salt         Include the following in your heart healthy plan:  Ask your dietitian or healthcare provider how many servings to have from each of the following food groups:  · Grains:      ? Whole-wheat breads, cereals, and pastas, and brown rice    ? Low-fat, low-sodium crackers and chips    · Vegetables:      ? Broccoli, green beans, green peas, and spinach    ? Collards, kale, and lima beans    ? Carrots, sweet potatoes, tomatoes, and peppers    ? Canned vegetables with no salt added    · Fruits:      ? Bananas, peaches, pears, and pineapple    ? Grapes, raisins, and dates    ? Oranges, tangerines, grapefruit, orange juice, and grapefruit juice    ? Apricots, mangoes, melons, and papaya    ? Raspberries and strawberries    ? Canned fruit with no added sugar    · Low-fat dairy:      ? Nonfat (skim) milk, 1% milk, and low-fat almond, cashew, or soy milks fortified with calcium    ? Low-fat cheese, regular or frozen yogurt, and cottage cheese    · Meats and proteins:      ? Lean cuts of beef and pork (loin, leg, round), skinless chicken and turkey    ? Legumes, soy products, egg whites, or nuts    Limit or do not include the following in your heart healthy plan:   · Unhealthy fats and oils:      ? Whole or 2% milk, cream cheese, sour cream, or cheese    ? High-fat cuts of beef (T-bone steaks, ribs), chicken or turkey with skin, and organ meats such as liver    ? Butter, stick margarine, shortening, and cooking oils such as coconut or palm oil    · Foods and liquids high in sodium:      ? Packaged foods, such as frozen dinners, cookies, macaroni and cheese, and cereals with more than 300 mg of sodium per serving    ? Vegetables with added sodium, such as instant potatoes, vegetables with added sauces, or regular canned vegetables    ? Cured or smoked meats, such as hot dogs, friedman, and sausage    ? High-sodium ketchup, barbecue sauce, salad dressing, pickles, olives, soy sauce, or miso    · Foods and liquids high in sugar:      ? Candy, cake, cookies, pies, or doughnuts    ? Soft drinks (soda), sports drinks, or sweetened tea    ?  Canned or dry mixes for cakes, soups, sauces, or gravies    Other healthy heart guidelines:   · Do not smoke  Nicotine and other chemicals in cigarettes and cigars can cause lung and heart damage  Ask your healthcare provider for information if you currently smoke and need help to quit  E-cigarettes or smokeless tobacco still contain nicotine  Talk to your healthcare provider before you use these products  · Limit or do not drink alcohol as directed  Alcohol can damage your heart and raise your blood pressure  Your healthcare provider may give you specific daily and weekly limits  The general recommended limit is 1 drink a day for women 21 or older and for men 72 or older  Do not have more than 3 drinks in a day or 7 in a week  The recommended limit is 2 drinks a day for men 24to 59years of age  Do not have more than 4 drinks in a day or 14 in a week  A drink of alcohol is 12 ounces of beer, 5 ounces of wine, or 1½ ounces of liquor  · Exercise regularly  Exercise can help you maintain a healthy weight and improve your blood pressure and cholesterol levels  Regular exercise can also decrease your risk for heart problems  Ask your healthcare provider about the best exercise plan for you  Do not start an exercise program without asking your healthcare provider  Follow up with your doctor or cardiologist as directed:  Write down your questions so you remember to ask them during your visits  © Copyright [address] Information is for End User's use only and may not be sold, redistributed or otherwise used for commercial purposes  All illustrations and images included in CareNotes® are the copyrighted property of [employer] D [employer] M , [employer]  or [address] [place], [place] [address]pretty   The above information is an  only  It is not intended as medical advice for individual conditions or treatments  Talk to your doctor, nurse or pharmacist before following any medical regimen to see if it is safe and effective for you      Calorie Counting Diet   WHAT YOU NEED TO KNOW:   What is a calorie counting diet? It is a meal plan based on counting calories each day to reach a healthy body weight  You will need to eat fewer calories if you are trying to lose weight  Weight loss may decrease your risk for certain health problems or improve your health if you have health problems  Some of these health problems include heart disease, high blood pressure, and diabetes  What foods should I avoid? Your dietitian will tell you if you need to avoid certain foods based on your body weight and health condition  You may need to avoid high-fat foods if you are at risk for or have heart disease  You may need to eat fewer foods from the breads and starches food group if you have diabetes  How many calories are in foods? The following is a list of foods and drinks with the approximate number of calories in each  Check the food label to find the exact number of calories  A dietitian can tell you how many calories you should have from each food group each day  · Carbohydrate:      ? ½ of a 3-inch bagel, 1 slice of bread, or ½ of a hamburger bun or hot dog bun (80)    ? 1 (8-inch) flour tortilla or ½ cup of cooked rice (100)    ? 1 (6-inch) corn tortilla (80)    ? 1 (6-inch) pancake or 1 cup of bran flakes cereal (110)    ? ½ cup of cooked cereal (80)    ? ½ cup of cooked pasta (85)    ? 1 ounce of pretzels (100)    ? 3 cups of air-popped popcorn without butter or oil (80)    · Dairy:      ? 1 cup of skim or 1% milk (90)    ? 1 cup of 2% milk (120)    ? 1 cup of whole milk (160)    ? 1 cup of 2% chocolate milk (220)    ? 1 ounce of low-fat cheese with 3 grams of fat per ounce (70)    ? 1 ounce of cheddar cheese (114)    ? ½ cup of 1% fat cottage cheese (80)    ? 1 cup of plain or sugar-free, fat-free yogurt (90)    · Protein foods:      ? 3 ounces of fish (not breaded or fried) (95)    ? 3 ounces of breaded, fried fish (195)    ? ¾ cup of tuna canned in water (105)    ?  3 ounces of chicken breast without skin (105)    ? 1 fried chicken breast with skin (350)    ? ¼ cup of fat free egg substitute (40)    ? 1 large egg (75)    ? 3 ounces of lean beef or pork (165)    ? 3 ounces of fried pork chop or ham (185)    ? ½ cup of cooked dried beans, such as kidney, walker, lentils, or navy (115)    ? 3 ounces of bologna or lunch meat (225)    ? 2 links of breakfast sausage (140)    · Vegetables:      ? ½ cup of sliced mushrooms (10)    ? 1 cup of salad greens, such as lettuce, spinach, or hanny (15)    ? ½ cup of steamed asparagus (20)    ? ½ cup of cooked summer squash, zucchini squash, or green or wax beans (25)    ? 1 cup of broccoli or cauliflower florets, or 1 medium tomato (25)    ? 1 large raw carrot or ½ cup of cooked carrots (40)    ? ? of a medium cucumber or 1 stalk of celery (5)    ? 1 small baked potato (160)    ? 1 cup of breaded, fried vegetables (230)    · Fruit:      ? 1 (6-inch) banana (55)     ? ½ of a 4-inch grapefruit (55)    ? 15 grapes (60)    ? 1 medium orange or apple (70)    ? 1 large peach (65)    ? 1 cup of fresh pineapple chunks (75)    ? 1 cup of melon cubes (50)    ? 1¼ cups of whole strawberries (45)    ? ½ cup of fruit canned in juice (55)    ? ½ cup of fruit canned in heavy syrup (110)    ? ? cup of raisins (130)    ? ½ cup of unsweetened fruit juice (60)    ? ½ cup of grape, cranberry, or prune juice (90)    · Fat:      ? 10 peanuts or 2 teaspoons of peanut butter (55)    ? 2 tablespoons of avocado or 1 tablespoon of regular salad dressing (45)    ? 2 slices of friedman (90)    ? 1 teaspoon of oil, such as safflower, canola, corn, or olive oil (45)    ? 2 teaspoons of low-fat margarine, or 1 tablespoon of low-fat mayonnaise (50)    ? 1 teaspoon of regular margarine (40)    ? 1 tablespoon of regular mayonnaise (135)    ? 1 tablespoon of cream cheese or 2 tablespoons of low-fat cream cheese (45)    ?  2 tablespoons of vegetable shortening (215)    · Dessert and sweets:      ? 8 animal crackers or 5 vanilla wafers (80)    ? 1 frozen fruit juice bar (80)    ? ½ cup of ice milk or low-fat frozen yogurt (90)    ? ½ cup of sherbet or sorbet (125)    ? ½ cup of sugar-free pudding or custard (60)    ? ½ cup of ice cream (140)    ? ½ cup of pudding or custard (175)    ? 1 (2-inch) square chocolate brownie (185)    · Combination foods:      ? Bean burrito made with an 8-inch tortilla, without cheese (275)    ? Chicken breast sandwich with lettuce and tomato (325)    ? 1 cup of chicken noodle soup (60)    ? 1 beef taco (175)    ? Regular hamburger with lettuce and tomato (310)    ? Regular cheeseburger with lettuce and tomato (410)     ? ¼ of a 12-inch cheese pizza (280)    ? Fried fish sandwich with lettuce and tomato (425)    ? Hot dog and bun (275)    ? 1½ cups of macaroni and cheese (310)    ? Taco salad with a fried tortilla shell (870)    · Low-calorie foods:      ? 1 tablespoon of ketchup or 1 tablespoon of fat free sour cream (15)    ? 1 teaspoon of mustard (5)    ? ¼ cup of salsa (20)    ? 1 large dill pickle (15)    ? 1 tablespoon of fat free salad dressing (10)    ? 2 teaspoons of low-sugar, light jam or jelly, or 1 tablespoon of sugar-free syrup (15)    ? 1 sugar-free popsicle (15)    ? 1 cup of club soda, seltzer water, or diet soda (0)    CARE AGREEMENT:   You have the right to help plan your care  Discuss treatment options with your healthcare provider to decide what care you want to receive  You always have the right to refuse treatment  The above information is an  only  It is not intended as medical advice for individual conditions or treatments  Talk to your doctor, nurse or pharmacist before following any medical regimen to see if it is safe and effective for you  © Copyright [address] Information is for End User's use only and may not be sold, redistributed or otherwise used for commercial purposes   All illustrations and images included in [facility] are the copyrighted property of A D A M , Inc  or [address] [place], [place] [address]

## 2021-08-09 ENCOUNTER — TELEPHONE (OUTPATIENT)
Dept: GASTROENTEROLOGY | Facility: HOSPITAL | Age: 71
End: 2021-08-09

## 2021-08-10 ENCOUNTER — HOSPITAL ENCOUNTER (OUTPATIENT)
Dept: GASTROENTEROLOGY | Facility: HOSPITAL | Age: 71
Setting detail: OUTPATIENT SURGERY
Discharge: HOME | End: 2021-08-10
Attending: INTERNAL MEDICINE | Admitting: INTERNAL MEDICINE
Payer: COMMERCIAL

## 2021-08-10 ENCOUNTER — ANESTHESIA (OUTPATIENT)
Dept: GASTROENTEROLOGY | Facility: HOSPITAL | Age: 71
End: 2021-08-10

## 2021-08-10 ENCOUNTER — ANESTHESIA EVENT (OUTPATIENT)
Dept: GASTROENTEROLOGY | Facility: HOSPITAL | Age: 71
End: 2021-08-10

## 2021-08-10 VITALS
SYSTOLIC BLOOD PRESSURE: 142 MMHG | BODY MASS INDEX: 28.44 KG/M2 | OXYGEN SATURATION: 97 % | TEMPERATURE: 97.6 F | HEART RATE: 77 BPM | RESPIRATION RATE: 18 BRPM | WEIGHT: 192 LBS | DIASTOLIC BLOOD PRESSURE: 88 MMHG | HEIGHT: 69 IN

## 2021-08-10 DIAGNOSIS — Z12.11 SPECIAL SCREENING FOR MALIGNANT NEOPLASMS, COLON: ICD-10-CM

## 2021-08-10 LAB — GLUCOSE SERPL-MCNC: 110 MG/DL (ref 65–140)

## 2021-08-10 PROCEDURE — 45385 COLONOSCOPY W/LESION REMOVAL: CPT | Performed by: INTERNAL MEDICINE

## 2021-08-10 PROCEDURE — 82948 REAGENT STRIP/BLOOD GLUCOSE: CPT

## 2021-08-10 PROCEDURE — 88305 TISSUE EXAM BY PATHOLOGIST: CPT | Performed by: PATHOLOGY

## 2021-08-10 RX ORDER — SODIUM CHLORIDE 9 MG/ML
INJECTION, SOLUTION INTRAVENOUS CONTINUOUS PRN
Status: DISCONTINUED | OUTPATIENT
Start: 2021-08-10 | End: 2021-08-10

## 2021-08-10 RX ORDER — PROPOFOL 10 MG/ML
INJECTION, EMULSION INTRAVENOUS CONTINUOUS PRN
Status: DISCONTINUED | OUTPATIENT
Start: 2021-08-10 | End: 2021-08-10

## 2021-08-10 RX ORDER — PROPOFOL 10 MG/ML
INJECTION, EMULSION INTRAVENOUS AS NEEDED
Status: DISCONTINUED | OUTPATIENT
Start: 2021-08-10 | End: 2021-08-10

## 2021-08-10 RX ADMIN — SODIUM CHLORIDE: 0.9 INJECTION, SOLUTION INTRAVENOUS at 09:26

## 2021-08-10 RX ADMIN — PROPOFOL 20 MG: 10 INJECTION, EMULSION INTRAVENOUS at 09:49

## 2021-08-10 RX ADMIN — PROPOFOL 100 MCG/KG/MIN: 10 INJECTION, EMULSION INTRAVENOUS at 09:34

## 2021-08-10 RX ADMIN — PROPOFOL 80 MG: 10 INJECTION, EMULSION INTRAVENOUS at 09:33

## 2021-08-10 NOTE — H&P
History and Physical - [facility]  Adry Shine 78 y o  male MRN: [id number]                  HPI: Adry Shine is a 78y o  year old male who presents for history of colon polyps, colonoscopy      REVIEW OF SYSTEMS: Per the HPI, and otherwise unremarkable  Historical Information   Past Medical History:   Diagnosis Date    [facility] ([email or web address] 75 )     Diabetes mellitus ([email or web address] 75 )     Hyperlipidemia     Hypertension      Past Surgical History:   Procedure Laterality Date    APPENDECTOMY       Social History   Social History     Substance and Sexual Activity   Alcohol Use Yes    Alcohol/week: 7 0 standard drinks    Types: 7 Glasses of wine per week    Comment: wine     Social History     Substance and Sexual Activity   Drug Use Never     Social History     Tobacco Use   Smoking Status Former Smoker    Types: Cigars   Smokeless Tobacco Never Used     Family History   Problem Relation Age of Onset    Parkinsonism Mother     Diabetes Father     Pancreatic cancer Father     Colon cancer Father     Hypertension Family     Hearing loss Family        Meds/Allergies       Current Outpatient Medications:     albuterol (PROVENTIL HFA,VENTOLIN HFA) 90 mcg/act inhaler    atorvastatin (LIPITOR) 10 mg tablet    ergocalciferol (VITAMIN D2) 50,000 units    fluticasone (FLONASE) 50 mcg/act nasal spray    losartan (COZAAR) 50 mg tablet    metFORMIN (GLUCOPHAGE-XR) 500 mg 24 hr tablet    OneTouch Ultra test strip    No Known Allergies    Objective     There were no vitals taken for this visit  PHYSICAL EXAM    Gen: NAD  Head: NCAT  CV: RRR  CHEST: Clear  ABD: soft, NT/ND  EXT: no edema      ASSESSMENT/PLAN:  This is a 78y o  year old male here for colonoscopy, and he is stable and optimized for his procedure

## 2021-08-10 NOTE — ANESTHESIA PREPROCEDURE EVALUATION
Procedure:  COLONOSCOPY    Relevant Problems   CARDIO   (+) Hyperlipidemia   (+) Hypertension      ENDO   (+) Type 2 diabetes mellitus with hyperglycemia (HCC)      Other   (+) Occult blood in stools   (+) Overweight        Physical Exam    Airway    Mallampati score: I  TM Distance: >3 FB  Neck ROM: full     Dental   No notable dental hx     Cardiovascular      Pulmonary      Other Findings        Anesthesia Plan  ASA Score- 2     Anesthesia Type- IV sedation with anesthesia with ASA Monitors  Additional Monitors:   Airway Plan:           Plan Factors-Exercise tolerance (METS): >4 METS  Chart reviewed  Existing labs reviewed  Patient is not a current smoker  Patient did not smoke on day of surgery  Induction- intravenous  Postoperative Plan-     Informed Consent- Anesthetic plan and risks discussed with patient and spouse  I personally reviewed this patient with the CRNA  Discussed and agreed on the Anesthesia Plan with the CRNA  Tahir Aguilar

## 2021-08-10 NOTE — ANESTHESIA POSTPROCEDURE EVALUATION
Post-Op Assessment Note    CV Status:  Stable  Pain Score: 0    Pain management: adequate     Mental Status:  Sleepy   Hydration Status:  Stable   PONV Controlled:  None   Airway Patency:  Patent      Post Op Vitals Reviewed: Yes      Staff: CRNA         No complications documented      BP   100/60   Temp     Pulse  71   Resp   16   SpO2   97%

## 2021-08-20 ENCOUNTER — TELEPHONE (OUTPATIENT)
Dept: GASTROENTEROLOGY | Facility: CLINIC | Age: 71
End: 2021-08-20

## 2021-08-20 NOTE — TELEPHONE ENCOUNTER
----- Message from Nuris Jackson DO sent at 8/18/2021  1:00 PM EDT -----  Please call patient and inform him that the polyps that were removed during his recent colonoscopy were benign  This is good news  He should have repeat colonoscopy in 5 years given his personal history of colon polyps and family history of colon cancer  Thank you  BURAK Johnson  Gastroenterology Fellow  [facility]  Available on [email or web address]

## 2021-08-24 ENCOUNTER — TELEPHONE (OUTPATIENT)
Dept: GASTROENTEROLOGY | Facility: CLINIC | Age: 71
End: 2021-08-24

## 2021-08-24 NOTE — TELEPHONE ENCOUNTER
----- Message from Lm Coleman DO sent at 8/18/2021  1:00 PM EDT -----  Please call patient and inform him that the polyps that were removed during his recent colonoscopy were benign  This is good news  He should have repeat colonoscopy in 5 years given his personal history of colon polyps and family history of colon cancer  Thank you  BURAK Meyers  Gastroenterology Fellow  [facility]  Available on [email or web address]

## 2021-11-29 ENCOUNTER — VBI (OUTPATIENT)
Dept: ADMINISTRATIVE | Facility: OTHER | Age: 71
End: 2021-11-29

## 2021-12-09 ENCOUNTER — OFFICE VISIT (OUTPATIENT)
Dept: INTERNAL MEDICINE CLINIC | Facility: CLINIC | Age: 71
End: 2021-12-09
Payer: COMMERCIAL

## 2021-12-09 ENCOUNTER — APPOINTMENT (OUTPATIENT)
Dept: LAB | Age: 71
End: 2021-12-09
Payer: COMMERCIAL

## 2021-12-09 VITALS
SYSTOLIC BLOOD PRESSURE: 122 MMHG | DIASTOLIC BLOOD PRESSURE: 60 MMHG | WEIGHT: 194 LBS | TEMPERATURE: 97.6 F | HEIGHT: 69 IN | HEART RATE: 76 BPM | BODY MASS INDEX: 28.73 KG/M2 | OXYGEN SATURATION: 99 %

## 2021-12-09 DIAGNOSIS — I10 PRIMARY HYPERTENSION: ICD-10-CM

## 2021-12-09 DIAGNOSIS — E11.65 TYPE 2 DIABETES MELLITUS WITH HYPERGLYCEMIA, WITHOUT LONG-TERM CURRENT USE OF INSULIN (HCC): ICD-10-CM

## 2021-12-09 DIAGNOSIS — E66.3 OVERWEIGHT: ICD-10-CM

## 2021-12-09 DIAGNOSIS — I10 ESSENTIAL HYPERTENSION: ICD-10-CM

## 2021-12-09 DIAGNOSIS — T78.2XXS ANAPHYLAXIS, SEQUELA: Primary | ICD-10-CM

## 2021-12-09 DIAGNOSIS — E78.01 FAMILIAL HYPERCHOLESTEROLEMIA: ICD-10-CM

## 2021-12-09 DIAGNOSIS — E55.9 VITAMIN D DEFICIENCY: ICD-10-CM

## 2021-12-09 DIAGNOSIS — Z23 ENCOUNTER FOR IMMUNIZATION: ICD-10-CM

## 2021-12-09 PROBLEM — T78.2XXA ANAPHYLAXIS: Status: ACTIVE | Noted: 2021-12-09

## 2021-12-09 LAB
ANION GAP SERPL CALCULATED.3IONS-SCNC: 8 MMOL/L (ref 4–13)
BUN SERPL-MCNC: 14 MG/DL (ref 5–25)
CALCIUM SERPL-MCNC: 9.6 MG/DL (ref 8.3–10.1)
CHLORIDE SERPL-SCNC: 108 MMOL/L (ref 100–108)
CHOLEST SERPL-MCNC: 142 MG/DL
CO2 SERPL-SCNC: 25 MMOL/L (ref 21–32)
CREAT SERPL-MCNC: 1.15 MG/DL (ref 0.6–1.3)
EST. AVERAGE GLUCOSE BLD GHB EST-MCNC: 134 MG/DL
GFR SERPL CREATININE-BSD FRML MDRD: 65 ML/MIN/1.73SQ M
GLUCOSE P FAST SERPL-MCNC: 106 MG/DL (ref 65–99)
HBA1C MFR BLD: 6.3 %
HDLC SERPL-MCNC: 66 MG/DL
LDLC SERPL CALC-MCNC: 53 MG/DL (ref 0–100)
NONHDLC SERPL-MCNC: 76 MG/DL
POTASSIUM SERPL-SCNC: 4.2 MMOL/L (ref 3.5–5.3)
SODIUM SERPL-SCNC: 141 MMOL/L (ref 136–145)
TRIGL SERPL-MCNC: 115 MG/DL

## 2021-12-09 PROCEDURE — 3074F SYST BP LT 130 MM HG: CPT | Performed by: INTERNAL MEDICINE

## 2021-12-09 PROCEDURE — 3008F BODY MASS INDEX DOCD: CPT | Performed by: INTERNAL MEDICINE

## 2021-12-09 PROCEDURE — 90662 IIV NO PRSV INCREASED AG IM: CPT | Performed by: INTERNAL MEDICINE

## 2021-12-09 PROCEDURE — 3044F HG A1C LEVEL LT 7.0%: CPT | Performed by: INTERNAL MEDICINE

## 2021-12-09 PROCEDURE — 99214 OFFICE O/P EST MOD 30 MIN: CPT | Performed by: INTERNAL MEDICINE

## 2021-12-09 PROCEDURE — 90670 PCV13 VACCINE IM: CPT | Performed by: INTERNAL MEDICINE

## 2021-12-09 PROCEDURE — 4040F PNEUMOC VAC/ADMIN/RCVD: CPT | Performed by: INTERNAL MEDICINE

## 2021-12-09 PROCEDURE — 1160F RVW MEDS BY RX/DR IN RCRD: CPT | Performed by: INTERNAL MEDICINE

## 2021-12-09 PROCEDURE — 83036 HEMOGLOBIN GLYCOSYLATED A1C: CPT

## 2021-12-09 PROCEDURE — 36415 COLL VENOUS BLD VENIPUNCTURE: CPT

## 2021-12-09 PROCEDURE — 90471 IMMUNIZATION ADMIN: CPT | Performed by: INTERNAL MEDICINE

## 2021-12-09 PROCEDURE — 4010F ACE/ARB THERAPY RXD/TAKEN: CPT | Performed by: INTERNAL MEDICINE

## 2021-12-09 PROCEDURE — 80048 BASIC METABOLIC PNL TOTAL CA: CPT

## 2021-12-09 PROCEDURE — 1036F TOBACCO NON-USER: CPT | Performed by: INTERNAL MEDICINE

## 2021-12-09 PROCEDURE — 80061 LIPID PANEL: CPT

## 2021-12-09 PROCEDURE — 90472 IMMUNIZATION ADMIN EACH ADD: CPT | Performed by: INTERNAL MEDICINE

## 2021-12-09 PROCEDURE — 3078F DIAST BP <80 MM HG: CPT | Performed by: INTERNAL MEDICINE

## 2021-12-09 RX ORDER — EPINEPHRINE 0.3 MG/.3ML
0.3 INJECTION SUBCUTANEOUS ONCE
Qty: 0.6 ML | Refills: 3 | Status: SHIPPED | OUTPATIENT
Start: 2021-12-09 | End: 2021-12-09

## 2021-12-09 RX ORDER — LOSARTAN POTASSIUM 50 MG/1
50 TABLET ORAL DAILY
Qty: 90 TABLET | Refills: 2 | Status: SHIPPED | OUTPATIENT
Start: 2021-12-09

## 2021-12-09 RX ORDER — ATORVASTATIN CALCIUM 10 MG/1
10 TABLET, FILM COATED ORAL DAILY
Qty: 90 TABLET | Refills: 3 | Status: SHIPPED | OUTPATIENT
Start: 2021-12-09

## 2021-12-09 RX ORDER — BLOOD SUGAR DIAGNOSTIC
STRIP MISCELLANEOUS
Qty: 100 EACH | Refills: 3 | Status: SHIPPED | OUTPATIENT
Start: 2021-12-09

## 2021-12-09 RX ORDER — METFORMIN HYDROCHLORIDE 500 MG/1
500 TABLET, EXTENDED RELEASE ORAL 2 TIMES DAILY WITH MEALS
Qty: 180 TABLET | Refills: 3 | Status: SHIPPED | OUTPATIENT
Start: 2021-12-09

## 2022-02-07 ENCOUNTER — VBI (OUTPATIENT)
Dept: ADMINISTRATIVE | Facility: OTHER | Age: 72
End: 2022-02-07

## 2022-03-15 ENCOUNTER — VBI (OUTPATIENT)
Dept: ADMINISTRATIVE | Facility: OTHER | Age: 72
End: 2022-03-15

## 2022-04-14 ENCOUNTER — APPOINTMENT (OUTPATIENT)
Dept: LAB | Facility: CLINIC | Age: 72
End: 2022-04-14
Payer: COMMERCIAL

## 2022-04-14 DIAGNOSIS — I10 ESSENTIAL HYPERTENSION: ICD-10-CM

## 2022-04-14 DIAGNOSIS — E11.65 TYPE 2 DIABETES MELLITUS WITH HYPERGLYCEMIA, WITHOUT LONG-TERM CURRENT USE OF INSULIN (HCC): ICD-10-CM

## 2022-04-14 LAB
ANION GAP SERPL CALCULATED.3IONS-SCNC: 4 MMOL/L (ref 4–13)
BUN SERPL-MCNC: 19 MG/DL (ref 5–25)
CALCIUM SERPL-MCNC: 9.4 MG/DL (ref 8.3–10.1)
CHLORIDE SERPL-SCNC: 108 MMOL/L (ref 100–108)
CO2 SERPL-SCNC: 28 MMOL/L (ref 21–32)
CREAT SERPL-MCNC: 1.12 MG/DL (ref 0.6–1.3)
EST. AVERAGE GLUCOSE BLD GHB EST-MCNC: 140 MG/DL
GFR SERPL CREATININE-BSD FRML MDRD: 66 ML/MIN/1.73SQ M
GLUCOSE P FAST SERPL-MCNC: 111 MG/DL (ref 65–99)
HBA1C MFR BLD: 6.5 %
POTASSIUM SERPL-SCNC: 4 MMOL/L (ref 3.5–5.3)
SODIUM SERPL-SCNC: 140 MMOL/L (ref 136–145)

## 2022-04-14 PROCEDURE — 80048 BASIC METABOLIC PNL TOTAL CA: CPT

## 2022-04-14 PROCEDURE — 83036 HEMOGLOBIN GLYCOSYLATED A1C: CPT

## 2022-04-14 PROCEDURE — 36415 COLL VENOUS BLD VENIPUNCTURE: CPT

## 2022-04-14 PROCEDURE — 3044F HG A1C LEVEL LT 7.0%: CPT | Performed by: INTERNAL MEDICINE

## 2022-04-19 ENCOUNTER — OFFICE VISIT (OUTPATIENT)
Dept: INTERNAL MEDICINE CLINIC | Facility: CLINIC | Age: 72
End: 2022-04-19
Payer: COMMERCIAL

## 2022-04-19 VITALS
SYSTOLIC BLOOD PRESSURE: 114 MMHG | WEIGHT: 191 LBS | TEMPERATURE: 97.7 F | HEART RATE: 74 BPM | BODY MASS INDEX: 28.29 KG/M2 | OXYGEN SATURATION: 98 % | DIASTOLIC BLOOD PRESSURE: 68 MMHG | HEIGHT: 69 IN

## 2022-04-19 DIAGNOSIS — Z00.00 HEALTHCARE MAINTENANCE: ICD-10-CM

## 2022-04-19 DIAGNOSIS — E11.65 TYPE 2 DIABETES MELLITUS WITH HYPERGLYCEMIA, WITHOUT LONG-TERM CURRENT USE OF INSULIN (HCC): ICD-10-CM

## 2022-04-19 DIAGNOSIS — I10 PRIMARY HYPERTENSION: ICD-10-CM

## 2022-04-19 DIAGNOSIS — E11.9 TYPE 2 DIABETES MELLITUS WITHOUT COMPLICATION, WITHOUT LONG-TERM CURRENT USE OF INSULIN (HCC): ICD-10-CM

## 2022-04-19 DIAGNOSIS — E55.9 VITAMIN D DEFICIENCY: Primary | ICD-10-CM

## 2022-04-19 DIAGNOSIS — E78.01 FAMILIAL HYPERCHOLESTEROLEMIA: ICD-10-CM

## 2022-04-19 PROCEDURE — 3074F SYST BP LT 130 MM HG: CPT | Performed by: INTERNAL MEDICINE

## 2022-04-19 PROCEDURE — 99214 OFFICE O/P EST MOD 30 MIN: CPT | Performed by: INTERNAL MEDICINE

## 2022-04-19 PROCEDURE — 1160F RVW MEDS BY RX/DR IN RCRD: CPT | Performed by: INTERNAL MEDICINE

## 2022-04-19 PROCEDURE — 3078F DIAST BP <80 MM HG: CPT | Performed by: INTERNAL MEDICINE

## 2022-04-19 PROCEDURE — 3008F BODY MASS INDEX DOCD: CPT | Performed by: INTERNAL MEDICINE

## 2022-04-19 PROCEDURE — 1036F TOBACCO NON-USER: CPT | Performed by: INTERNAL MEDICINE

## 2022-04-19 NOTE — ASSESSMENT & PLAN NOTE
Patient will be due for his 1st Medicare wellness visit when returns to the office in 4 months  He was given a slip for complete labs to be performed prior to that visit  In the interim he was told if the any new medical problems or concerns to please call

## 2022-04-19 NOTE — ASSESSMENT & PLAN NOTE
Patient remains with atorvastatin 10 mg daily  We told the patient that the with a history of diabetes he is at increased risk for coronary artery disease, peripheral vascular disease and it is extremely important that we keep his cholesterol under control  He is not always compliant with  His diet especially with his intake of fats and cholesterol    We will check a lipid profile with his next visit

## 2022-04-19 NOTE — ASSESSMENT & PLAN NOTE
Patient has a history of vitamin-D deficiency will check a level with his next visit    Continues meds as previously

## 2022-04-19 NOTE — ASSESSMENT & PLAN NOTE
Blood pressure showing excellent control  Patient will continue present surveillance  He remains on a low dose of losartan a mg daily    We will continue to monitor his renal function which has been stable

## 2022-04-19 NOTE — PROGRESS NOTES
Assessment/Plan:    Type 2 diabetes mellitus with hyperglycemia ([facility] [address] )   Patient did have labs performed prior to visit  Checking on his diabetes control  As noted hemoglobin A1cs consistently at 6 5  Patient admits that he is not always watching his diet as far as intake  Of concentrated sweets and simple carbohydrates  No significant weight loss since his last visit  Not involved in any routine exercise program   Patient will have a fasting blood sugar hemoglobin A1c checked with his next visit  We remind the patient again at the importance of having routine eye exams routine foot exams  Lab Results   Component Value Date    HGBA1C 6 5 (H) 04/14/2022       Hypertension   Blood pressure showing excellent control  Patient will continue present surveillance  He remains on a low dose of losartan a mg daily  We will continue to monitor his renal function which has been stable    Hyperlipidemia   Patient remains with atorvastatin 10 mg daily  We told the patient that the with a history of diabetes he is at increased risk for coronary artery disease, peripheral vascular disease and it is extremely important that we keep his cholesterol under control  He is not always compliant with  His diet especially with his intake of fats and cholesterol  We will check a lipid profile with his next visit    Healthcare maintenance   Patient will be due for his 1st Medicare wellness visit when returns to the office in 4 months  He was given a slip for complete labs to be performed prior to that visit  In the interim he was told if the any new medical problems or concerns to please call  Vitamin D deficiency    Patient has a history of vitamin-D deficiency will check a level with his next visit  Continues meds as previously       Diagnoses and all orders for this visit:    Vitamin D deficiency  -     Vitamin D 25 hydroxy; Future    Primary hypertension  -     Comprehensive metabolic panel;  Future  -     CBC and differential; Future  -     Lipid panel; Future  -     UA (URINE) with reflex to Scope; Future    Healthcare maintenance  -     Comprehensive metabolic panel; Future  -     CBC and differential; Future  -     Lipid panel; Future  -     UA (URINE) with reflex to Scope; Future  -     Hemoglobin A1C; Future  -     Microalbumin / creatinine urine ratio  -     PSA, Total Screen; Future    Type 2 diabetes mellitus without complication, without long-term current use of insulin (HCC)  -     Hemoglobin A1C; Future  -     Microalbumin / creatinine urine ratio    Familial hypercholesterolemia    Type 2 diabetes mellitus with hyperglycemia, without long-term current use of insulin (HCC)          Subjective:      Patient ID: Hank Conde is a 73 y o  male  The patient is a 82-year-old male history of multiple problems as outlined previously including diabetes mellitus type 2, hypertension, hyperlipidemia  Patient is here today for routine follow-up and he did have labs performed prior to the visit and we did discuss the results  Patient states he is feeling well and offers no new medical complaints or concerns to      The following portions of the patient's history were reviewed and updated as appropriate:   He  has a past medical history of Diabetes ([address] ), Diabetes mellitus ([address] ), Hyperlipidemia, and Hypertension  He   Patient Active Problem List    Diagnosis Date Noted    Anaphylaxis 12/09/2021    Left chronic serous otitis media 08/13/2020    Occult blood in stools 01/27/2020    Acute non-recurrent frontal sinusitis 01/17/2020    Bronchitis 01/17/2020    Overweight 04/08/2019    Acute atopic conjunctivitis of both eyes 12/03/2018    Healthcare maintenance 09/07/2018    Hypertension 10/21/2016    Vitamin D deficiency 03/03/2015    Type 2 diabetes mellitus with hyperglycemia ([address] ) 11/20/2013    Hyperlipidemia 09/18/2012     He  has a past surgical history that includes Appendectomy    His family history includes Colon cancer in his father; Diabetes in his father; Hearing loss in his family; Hypertension in his family; Pancreatic cancer in his father; Parkinsonism in his mother  He  reports that he has quit smoking  His smoking use included cigars  He has never used smokeless tobacco  He reports current alcohol use of about 7 0 standard drinks of alcohol per week  He reports that he does not use drugs  Current Outpatient Medications   Medication Sig Dispense Refill    albuterol (PROVENTIL HFA,VENTOLIN HFA) 90 mcg/act inhaler Inhale 2 puffs every 6 (six) hours as needed for wheezing or shortness of breath 1 Inhaler 5    atorvastatin (LIPITOR) 10 mg tablet Take 1 tablet (10 mg total) by mouth daily 90 tablet 3    ergocalciferol (VITAMIN D2) 50,000 units Take by mouth      fluticasone (FLONASE) 50 mcg/act nasal spray 1 spray into each nostril daily      glucose blood (OneTouch Ultra) test strip Test blood sugars daily 100 each 3    losartan (COZAAR) 50 mg tablet Take 1 tablet (50 mg total) by mouth daily 90 tablet 2    metFORMIN (GLUCOPHAGE-XR) 500 mg 24 hr tablet Take 1 tablet (500 mg total) by mouth 2 (two) times a day with meals (Patient taking differently: Take 500 mg by mouth daily with breakfast  ) 180 tablet 3    EPINEPHrine (EPIPEN) 0 3 mg/0 3 mL SOAJ Inject 0 3 mL (0 3 mg total) into a muscle once for 1 dose 0 6 mL 3     No current facility-administered medications for this visit       Current Outpatient Medications on File Prior to Visit   Medication Sig    albuterol (PROVENTIL HFA,VENTOLIN HFA) 90 mcg/act inhaler Inhale 2 puffs every 6 (six) hours as needed for wheezing or shortness of breath    atorvastatin (LIPITOR) 10 mg tablet Take 1 tablet (10 mg total) by mouth daily    ergocalciferol (VITAMIN D2) 50,000 units Take by mouth    fluticasone (FLONASE) 50 mcg/act nasal spray 1 spray into each nostril daily    glucose blood (OneTouch Ultra) test strip Test blood sugars daily    losartan (COZAAR) 50 mg tablet Take 1 tablet (50 mg total) by mouth daily    metFORMIN (GLUCOPHAGE-XR) 500 mg 24 hr tablet Take 1 tablet (500 mg total) by mouth 2 (two) times a day with meals (Patient taking differently: Take 500 mg by mouth daily with breakfast  )    EPINEPHrine (EPIPEN) 0 3 mg/0 3 mL SOAJ Inject 0 3 mL (0 3 mg total) into a muscle once for 1 dose     No current facility-administered medications on file prior to visit  He is allergic to bee venom       Review of Systems   Constitutional: Negative  HENT: Negative  Eyes: Negative  Respiratory: Negative  Cardiovascular: Negative  Gastrointestinal: Negative  Endocrine: Negative  Genitourinary: Negative  Musculoskeletal: Negative  Skin: Negative  Allergic/Immunologic: Negative  Neurological: Negative  Hematological: Negative  Psychiatric/Behavioral: Negative  Objective:      /68   Pulse 74   Temp 97 7 °F (36 5 °C)   Ht 5' 9" (1 753 m)   Wt 86 6 kg (191 lb)   SpO2 98%   BMI 28 21 kg/m²          Physical Exam  Vitals and nursing note reviewed  Constitutional:       General: He is not in acute distress  Appearance: Normal appearance  He is not ill-appearing, toxic-appearing or diaphoretic  Comments:  Pleasant articulate overweight 81-year-old male who is awake alert no acute distress and oriented x3   HENT:      Head: Normocephalic and atraumatic  Right Ear: Tympanic membrane, ear canal and external ear normal  There is no impacted cerumen  Left Ear: Tympanic membrane, ear canal and external ear normal  There is no impacted cerumen  Nose: Nose normal  No congestion or rhinorrhea  Mouth/Throat:      Mouth: Mucous membranes are moist       Pharynx: Oropharynx is clear  No oropharyngeal exudate or posterior oropharyngeal erythema  Eyes:      General: No scleral icterus  Right eye: No discharge  Left eye: No discharge        Extraocular Movements: Extraocular movements intact  Conjunctiva/sclera: Conjunctivae normal       Pupils: Pupils are equal, round, and reactive to light  Neck:      Vascular: No carotid bruit  Cardiovascular:      Rate and Rhythm: Normal rate and regular rhythm  Pulses: Normal pulses  Heart sounds: Normal heart sounds  No murmur heard  No friction rub  No gallop  Pulmonary:      Effort: Pulmonary effort is normal  No respiratory distress  Breath sounds: Normal breath sounds  No stridor  No wheezing, rhonchi or rales  Chest:      Chest wall: No tenderness  Abdominal:      General: Abdomen is flat  Bowel sounds are normal  There is no distension  Palpations: Abdomen is soft  There is no mass  Tenderness: There is no abdominal tenderness  There is no right CVA tenderness, left CVA tenderness, guarding or rebound  Hernia: No hernia is present  Musculoskeletal:         General: No swelling, tenderness, deformity or signs of injury  Normal range of motion  Cervical back: Normal range of motion and neck supple  No rigidity or tenderness  Right lower leg: No edema  Left lower leg: No edema  Lymphadenopathy:      Cervical: No cervical adenopathy  Skin:     General: Skin is warm and dry  Capillary Refill: Capillary refill takes less than 2 seconds  Coloration: Skin is not jaundiced or pale  Findings: No bruising, erythema, lesion or rash  Neurological:      General: No focal deficit present  Mental Status: He is alert and oriented to person, place, and time  Mental status is at baseline  Cranial Nerves: No cranial nerve deficit  Sensory: No sensory deficit  Motor: No weakness  Coordination: Coordination normal       Gait: Gait normal       Deep Tendon Reflexes: Reflexes normal    Psychiatric:         Mood and Affect: Mood normal          Behavior: Behavior normal          Thought Content:  Thought content normal          Judgment: Judgment normal

## 2022-04-19 NOTE — ASSESSMENT & PLAN NOTE
Patient did have labs performed prior to visit  Checking on his diabetes control  As noted hemoglobin A1cs consistently at 6 5  Patient admits that he is not always watching his diet as far as intake  Of concentrated sweets and simple carbohydrates  No significant weight loss since his last visit  Not involved in any routine exercise program   Patient will have a fasting blood sugar hemoglobin A1c checked with his next visit    We remind the patient again at the importance of having routine eye exams routine foot exams  Lab Results   Component Value Date    HGBA1C 6 5 (H) 04/14/2022

## 2022-05-11 LAB
LEFT EYE DIABETIC RETINOPATHY: NORMAL
RIGHT EYE DIABETIC RETINOPATHY: NORMAL

## 2022-06-24 ENCOUNTER — OFFICE VISIT (OUTPATIENT)
Dept: INTERNAL MEDICINE CLINIC | Facility: CLINIC | Age: 72
End: 2022-06-24

## 2022-06-24 VITALS
BODY MASS INDEX: 28.14 KG/M2 | WEIGHT: 190 LBS | OXYGEN SATURATION: 98 % | SYSTOLIC BLOOD PRESSURE: 134 MMHG | HEART RATE: 77 BPM | DIASTOLIC BLOOD PRESSURE: 62 MMHG | HEIGHT: 69 IN | TEMPERATURE: 97.5 F

## 2022-06-24 DIAGNOSIS — J01.10 ACUTE NON-RECURRENT FRONTAL SINUSITIS: Primary | ICD-10-CM

## 2022-06-24 PROCEDURE — 99213 OFFICE O/P EST LOW 20 MIN: CPT | Performed by: INTERNAL MEDICINE

## 2022-06-24 RX ORDER — AMOXICILLIN 500 MG/1
500 CAPSULE ORAL EVERY 8 HOURS SCHEDULED
Qty: 30 CAPSULE | Refills: 0 | Status: SHIPPED | OUTPATIENT
Start: 2022-06-24 | End: 2022-07-04

## 2022-06-24 NOTE — ASSESSMENT & PLAN NOTE
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

## 2022-06-24 NOTE — PROGRESS NOTES
Assessment/Plan:    Acute non-recurrent frontal sinusitis  Patient is here today for re-evaluation  States that he is suffering from a sinus infection acute onset approximately 2 days ago  Nasal congestion, postnasal drip, frontal sinus headache  No fever no chills COVID is negative  Patient on evaluation has enlarged red nasal turbinates bilaterally with thick yellow discharge, erythema to posterior airway with again a thick yellow postnasal drip, tenderness to percussion frontal sinuses bilaterally  Lungs are clear to auscultation and heart is regular rate and rhythm  Patient has had recurrent sinus infections in the past   This 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   Placed on amoxicillin 500 mg p o  t i d  for 10 days and patient is to continue with his Flonase nasal spray  Call if not improving       Diagnoses and all orders for this visit:    Acute non-recurrent frontal sinusitis  -     amoxicillin (AMOXIL) 500 mg capsule; Take 1 capsule (500 mg total) by mouth every 8 (eight) hours for 10 days          Subjective:      Patient ID: Hank Conde is a 73 y o  male  Patient is a 82-year-old male history of medical problems as outlined previously who is here today acutely for evaluation  States he has been ill for the last 2 days with nasal congestion frontal sinus pressure, postnasal drip  Denies any fever chills  He states the symptoms or cardiac arrest aches he has with sinus infection  COVID negative      The following portions of the patient's history were reviewed and updated as appropriate:   He  has a past medical history of Diabetes ([address] ), Diabetes mellitus ([facility] [address] ), Hyperlipidemia, and Hypertension    He   Patient Active Problem List    Diagnosis Date Noted    Anaphylaxis 12/09/2021    Left chronic serous otitis media 08/13/2020    Occult blood in stools 01/27/2020    Acute non-recurrent frontal sinusitis 01/17/2020    Bronchitis 01/17/2020    Overweight 04/08/2019    Acute atopic conjunctivitis of both eyes 12/03/2018    Healthcare maintenance 09/07/2018    Hypertension 10/21/2016    Vitamin D deficiency 03/03/2015    Type 2 diabetes mellitus with hyperglycemia ([facility] [address] ) 11/20/2013    Hyperlipidemia 09/18/2012     He  has a past surgical history that includes Appendectomy  His family history includes Colon cancer in his father; Diabetes in his father; Hearing loss in his family; Hypertension in his family; Pancreatic cancer in his father; Parkinsonism in his mother  He  reports that he has quit smoking  His smoking use included cigars  He has never used smokeless tobacco  He reports current alcohol use of about 7 0 standard drinks of alcohol per week  He reports that he does not use drugs  Current Outpatient Medications   Medication Sig Dispense Refill    albuterol (PROVENTIL HFA,VENTOLIN HFA) 90 mcg/act inhaler Inhale 2 puffs every 6 (six) hours as needed for wheezing or shortness of breath 1 Inhaler 5    amoxicillin (AMOXIL) 500 mg capsule Take 1 capsule (500 mg total) by mouth every 8 (eight) hours for 10 days 30 capsule 0    atorvastatin (LIPITOR) 10 mg tablet Take 1 tablet (10 mg total) by mouth daily 90 tablet 3    ergocalciferol (VITAMIN D2) 50,000 units Take by mouth      fluticasone (FLONASE) 50 mcg/act nasal spray 1 spray into each nostril daily      glucose blood (OneTouch Ultra) test strip Test blood sugars daily 100 each 3    losartan (COZAAR) 50 mg tablet Take 1 tablet (50 mg total) by mouth daily 90 tablet 2    metFORMIN (GLUCOPHAGE-XR) 500 mg 24 hr tablet Take 1 tablet (500 mg total) by mouth 2 (two) times a day with meals (Patient taking differently: Take 500 mg by mouth daily with breakfast) 180 tablet 3    EPINEPHrine (EPIPEN) 0 3 mg/0 3 mL SOAJ Inject 0 3 mL (0 3 mg total) into a muscle once for 1 dose 0 6 mL 3     No current facility-administered medications for this visit       Current Outpatient Medications on File Prior to Visit   Medication Sig    albuterol (PROVENTIL HFA,VENTOLIN HFA) 90 mcg/act inhaler Inhale 2 puffs every 6 (six) hours as needed for wheezing or shortness of breath    atorvastatin (LIPITOR) 10 mg tablet Take 1 tablet (10 mg total) by mouth daily    ergocalciferol (VITAMIN D2) 50,000 units Take by mouth    fluticasone (FLONASE) 50 mcg/act nasal spray 1 spray into each nostril daily    glucose blood (OneTouch Ultra) test strip Test blood sugars daily    losartan (COZAAR) 50 mg tablet Take 1 tablet (50 mg total) by mouth daily    metFORMIN (GLUCOPHAGE-XR) 500 mg 24 hr tablet Take 1 tablet (500 mg total) by mouth 2 (two) times a day with meals (Patient taking differently: Take 500 mg by mouth daily with breakfast)    EPINEPHrine (EPIPEN) 0 3 mg/0 3 mL SOAJ Inject 0 3 mL (0 3 mg total) into a muscle once for 1 dose     No current facility-administered medications on file prior to visit  He is allergic to bee venom       Review of Systems   Constitutional: Negative  HENT: Positive for congestion, postnasal drip, rhinorrhea, sinus pressure and sinus pain  Negative for dental problem, drooling, ear discharge, ear pain, facial swelling, hearing loss, mouth sores, nosebleeds, sneezing, sore throat, tinnitus, trouble swallowing and voice change  Eyes: Negative  Respiratory: Negative  Cardiovascular: Negative  Gastrointestinal: Negative  Endocrine: Negative  Genitourinary: Negative  Musculoskeletal: Negative  Skin: Negative  Allergic/Immunologic: Negative  Neurological: Negative  Hematological: Negative  Psychiatric/Behavioral: Negative  Objective:      /62   Pulse 77   Temp 97 5 °F (36 4 °C)   Ht 5' 9" (1 753 m)   Wt 86 2 kg (190 lb)   SpO2 98%   BMI 28 06 kg/m²          Physical Exam  Vitals and nursing note reviewed  Constitutional:       General: He is not in acute distress  Appearance: Normal appearance  He is not ill-appearing, toxic-appearing or diaphoretic  Comments: Pleasant articulate 44-year-old male who is awake alert  Sounds congested  no acute distress   HENT:      Head: Normocephalic and atraumatic  Right Ear: Tympanic membrane, ear canal and external ear normal  There is no impacted cerumen  Left Ear: Tympanic membrane, ear canal and external ear normal  There is no impacted cerumen  Nose: Congestion and rhinorrhea present  Comments: Patient has diffuse erythema to nasal mucosa with enlarged red nasal turbinates bilaterally thick yellow nasal discharge     Mouth/Throat:      Mouth: Mucous membranes are moist       Pharynx: No oropharyngeal exudate or posterior oropharyngeal erythema  Comments: Erythema to posterior airway with a thick yellow postnasal drip  Eyes:      General: No scleral icterus  Right eye: No discharge  Left eye: No discharge  Extraocular Movements: Extraocular movements intact  Conjunctiva/sclera: Conjunctivae normal       Pupils: Pupils are equal, round, and reactive to light  Neck:      Vascular: No carotid bruit  Cardiovascular:      Rate and Rhythm: Normal rate and regular rhythm  Pulmonary:      Effort: Pulmonary effort is normal  No respiratory distress  Breath sounds: Normal breath sounds  No stridor  No wheezing, rhonchi or rales  Chest:      Chest wall: No tenderness  Musculoskeletal:      Cervical back: Normal range of motion and neck supple  No rigidity or tenderness  Lymphadenopathy:      Cervical: No cervical adenopathy  Skin:     General: Skin is warm and dry  Neurological:      General: No focal deficit present  Mental Status: He is alert and oriented to person, place, and time  Mental status is at baseline  Psychiatric:         Mood and Affect: Mood normal          Behavior: Behavior normal          Thought Content:  Thought content normal          Judgment: Judgment normal

## 2022-07-18 ENCOUNTER — TELEPHONE (OUTPATIENT)
Dept: INTERNAL MEDICINE CLINIC | Facility: CLINIC | Age: 72
End: 2022-07-18

## 2022-07-18 DIAGNOSIS — B37.0 ORAL THRUSH: Primary | ICD-10-CM

## 2022-07-18 NOTE — TELEPHONE ENCOUNTER
Samuelcleo Carlton had a recent visit with you and still has a terible sore thrato, not coughing as much    Please advise if you can treat and /or does he need appt?      [phone]

## 2022-07-18 NOTE — ASSESSMENT & PLAN NOTE
Patient was recently seen and evaluated in our office for a sinus infection  He states his nasal congestion has improved  He did have a slight cough again this is improved  No fever chills but is complaining of severe sore throat  States somewhat difficult to swallow  Does not state that his tongue is swollen are enlarged but I do have concerns with him being on antibiotics and history of diabetes his possible oral thrush  Will send a prescription for nystatin swish and spit to the pharmacy    He is urged to call if not improving and would need to be seen

## 2022-08-17 ENCOUNTER — RA CDI HCC (OUTPATIENT)
Dept: OTHER | Facility: HOSPITAL | Age: 72
End: 2022-08-17

## 2022-08-17 NOTE — PROGRESS NOTES
[facility] [[email or web address]  coding opportunities          Chart Reviewed number of suggestions sent to Provider: 1    Based on clinical documentation indicated in your record, it appears that the patient may have the following conditions:    E11 22, N18 2- Type 2 diabetes mellitus with diabetic chronic kidney disease ([facility] [[email or web address] 75 )  *Per coding guidelines a causal relationship is presumed between DM and CKD     If this is correct, please document and assess at your next visit   8/26/22       Patients Insurance        Commercial Insurance: [employer]

## 2022-08-23 ENCOUNTER — APPOINTMENT (OUTPATIENT)
Dept: LAB | Facility: CLINIC | Age: 72
End: 2022-08-23
Payer: MEDICARE

## 2022-08-23 DIAGNOSIS — E55.9 VITAMIN D DEFICIENCY: ICD-10-CM

## 2022-08-23 DIAGNOSIS — E11.9 TYPE 2 DIABETES MELLITUS WITHOUT COMPLICATION, WITHOUT LONG-TERM CURRENT USE OF INSULIN (HCC): ICD-10-CM

## 2022-08-23 DIAGNOSIS — Z00.00 HEALTHCARE MAINTENANCE: ICD-10-CM

## 2022-08-23 DIAGNOSIS — I10 PRIMARY HYPERTENSION: ICD-10-CM

## 2022-08-23 LAB
25(OH)D3 SERPL-MCNC: 54.3 NG/ML (ref 30–100)
BACTERIA UR QL AUTO: ABNORMAL /HPF
BASOPHILS # BLD AUTO: 0.06 THOUSANDS/ΜL (ref 0–0.1)
BASOPHILS NFR BLD AUTO: 1 % (ref 0–1)
BILIRUB UR QL STRIP: NEGATIVE
CLARITY UR: CLEAR
COLOR UR: YELLOW
EOSINOPHIL # BLD AUTO: 0.51 THOUSAND/ΜL (ref 0–0.61)
EOSINOPHIL NFR BLD AUTO: 7 % (ref 0–6)
ERYTHROCYTE [DISTWIDTH] IN BLOOD BY AUTOMATED COUNT: 13.7 % (ref 11.6–15.1)
EST. AVERAGE GLUCOSE BLD GHB EST-MCNC: 128 MG/DL
GLUCOSE UR STRIP-MCNC: NEGATIVE MG/DL
HBA1C MFR BLD: 6.1 %
HCT VFR BLD AUTO: 43.6 % (ref 36.5–49.3)
HGB BLD-MCNC: 14.1 G/DL (ref 12–17)
HGB UR QL STRIP.AUTO: NEGATIVE
HYALINE CASTS #/AREA URNS LPF: ABNORMAL /LPF
IMM GRANULOCYTES # BLD AUTO: 0.03 THOUSAND/UL (ref 0–0.2)
IMM GRANULOCYTES NFR BLD AUTO: 0 % (ref 0–2)
KETONES UR STRIP-MCNC: NEGATIVE MG/DL
LEUKOCYTE ESTERASE UR QL STRIP: NEGATIVE
LYMPHOCYTES # BLD AUTO: 1.94 THOUSANDS/ΜL (ref 0.6–4.47)
LYMPHOCYTES NFR BLD AUTO: 27 % (ref 14–44)
MCH RBC QN AUTO: 30.1 PG (ref 26.8–34.3)
MCHC RBC AUTO-ENTMCNC: 32.3 G/DL (ref 31.4–37.4)
MCV RBC AUTO: 93 FL (ref 82–98)
MONOCYTES # BLD AUTO: 0.59 THOUSAND/ΜL (ref 0.17–1.22)
MONOCYTES NFR BLD AUTO: 8 % (ref 4–12)
NEUTROPHILS # BLD AUTO: 3.98 THOUSANDS/ΜL (ref 1.85–7.62)
NEUTS SEG NFR BLD AUTO: 57 % (ref 43–75)
NITRITE UR QL STRIP: NEGATIVE
NON-SQ EPI CELLS URNS QL MICRO: ABNORMAL /HPF
NRBC BLD AUTO-RTO: 0 /100 WBCS
PH UR STRIP.AUTO: 5.5 [PH]
PLATELET # BLD AUTO: 216 THOUSANDS/UL (ref 149–390)
PMV BLD AUTO: 10 FL (ref 8.9–12.7)
PROT UR STRIP-MCNC: ABNORMAL MG/DL
PSA SERPL-MCNC: 1.3 NG/ML (ref 0–4)
RBC # BLD AUTO: 4.69 MILLION/UL (ref 3.88–5.62)
RBC #/AREA URNS AUTO: ABNORMAL /HPF
SP GR UR STRIP.AUTO: 1.02 (ref 1–1.03)
UROBILINOGEN UR STRIP-ACNC: <2 MG/DL
WBC # BLD AUTO: 7.11 THOUSAND/UL (ref 4.31–10.16)
WBC #/AREA URNS AUTO: ABNORMAL /HPF

## 2022-08-23 PROCEDURE — 82306 VITAMIN D 25 HYDROXY: CPT

## 2022-08-23 PROCEDURE — 80061 LIPID PANEL: CPT

## 2022-08-23 PROCEDURE — 80053 COMPREHEN METABOLIC PANEL: CPT

## 2022-08-23 PROCEDURE — 83036 HEMOGLOBIN GLYCOSYLATED A1C: CPT

## 2022-08-23 PROCEDURE — G0103 PSA SCREENING: HCPCS

## 2022-08-23 PROCEDURE — 81001 URINALYSIS AUTO W/SCOPE: CPT

## 2022-08-23 PROCEDURE — 36415 COLL VENOUS BLD VENIPUNCTURE: CPT

## 2022-08-23 PROCEDURE — 85025 COMPLETE CBC W/AUTO DIFF WBC: CPT

## 2022-08-24 LAB
ALBUMIN SERPL BCP-MCNC: 3.7 G/DL (ref 3.5–5)
ALP SERPL-CCNC: 71 U/L (ref 46–116)
ALT SERPL W P-5'-P-CCNC: 36 U/L (ref 12–78)
ANION GAP SERPL CALCULATED.3IONS-SCNC: 2 MMOL/L (ref 4–13)
AST SERPL W P-5'-P-CCNC: 27 U/L (ref 5–45)
BILIRUB SERPL-MCNC: 0.85 MG/DL (ref 0.2–1)
BUN SERPL-MCNC: 15 MG/DL (ref 5–25)
CALCIUM SERPL-MCNC: 9 MG/DL (ref 8.3–10.1)
CHLORIDE SERPL-SCNC: 110 MMOL/L (ref 96–108)
CHOLEST SERPL-MCNC: 147 MG/DL
CO2 SERPL-SCNC: 28 MMOL/L (ref 21–32)
CREAT SERPL-MCNC: 1.09 MG/DL (ref 0.6–1.3)
GFR SERPL CREATININE-BSD FRML MDRD: 68 ML/MIN/1.73SQ M
GLUCOSE P FAST SERPL-MCNC: 116 MG/DL (ref 65–99)
HDLC SERPL-MCNC: 63 MG/DL
LDLC SERPL CALC-MCNC: 69 MG/DL (ref 0–100)
NONHDLC SERPL-MCNC: 84 MG/DL
POTASSIUM SERPL-SCNC: 4.4 MMOL/L (ref 3.5–5.3)
PROT SERPL-MCNC: 6.8 G/DL (ref 6.4–8.4)
SODIUM SERPL-SCNC: 140 MMOL/L (ref 135–147)
TRIGL SERPL-MCNC: 74 MG/DL

## 2022-08-26 ENCOUNTER — OFFICE VISIT (OUTPATIENT)
Dept: INTERNAL MEDICINE CLINIC | Facility: CLINIC | Age: 72
End: 2022-08-26
Payer: MEDICARE

## 2022-08-26 VITALS
WEIGHT: 184 LBS | BODY MASS INDEX: 27.25 KG/M2 | HEART RATE: 71 BPM | HEIGHT: 69 IN | RESPIRATION RATE: 18 BRPM | TEMPERATURE: 97.6 F | SYSTOLIC BLOOD PRESSURE: 126 MMHG | DIASTOLIC BLOOD PRESSURE: 64 MMHG | OXYGEN SATURATION: 97 %

## 2022-08-26 DIAGNOSIS — E55.9 VITAMIN D DEFICIENCY: ICD-10-CM

## 2022-08-26 DIAGNOSIS — E78.01 FAMILIAL HYPERCHOLESTEROLEMIA: ICD-10-CM

## 2022-08-26 DIAGNOSIS — Z00.00 HEALTHCARE MAINTENANCE: ICD-10-CM

## 2022-08-26 DIAGNOSIS — L30.9 DERMATITIS: ICD-10-CM

## 2022-08-26 DIAGNOSIS — M25.512 ACUTE PAIN OF LEFT SHOULDER: Primary | ICD-10-CM

## 2022-08-26 DIAGNOSIS — E11.65 TYPE 2 DIABETES MELLITUS WITH HYPERGLYCEMIA, WITHOUT LONG-TERM CURRENT USE OF INSULIN (HCC): ICD-10-CM

## 2022-08-26 DIAGNOSIS — I10 PRIMARY HYPERTENSION: ICD-10-CM

## 2022-08-26 PROCEDURE — G0439 PPPS, SUBSEQ VISIT: HCPCS | Performed by: INTERNAL MEDICINE

## 2022-08-26 PROCEDURE — 99214 OFFICE O/P EST MOD 30 MIN: CPT | Performed by: INTERNAL MEDICINE

## 2022-08-26 RX ORDER — CLOTRIMAZOLE AND BETAMETHASONE DIPROPIONATE 10; .64 MG/G; MG/G
CREAM TOPICAL 2 TIMES DAILY
Qty: 30 G | Refills: 4 | Status: SHIPPED | OUTPATIENT
Start: 2022-08-26

## 2022-08-26 NOTE — ASSESSMENT & PLAN NOTE
Patient is complaining of some acute discomfort in his left shoulder  No specific accident or injury  On evaluation patient has some minor pain with movement and rotation to his left shoulder girdle with mostly discomfort to the area of the biceps tendon insertion at the shoulder  Patient does not have any weakness in the shoulder with movement or against resistance  No gross deformity  States that this is been nagging discomfort for few weeks and wishes to be seen by Orthopedics for evaluation consult has been sent

## 2022-08-26 NOTE — ASSESSMENT & PLAN NOTE
Patient does have a history of vitamin-D deficiency  Previously had been taking a large dose of vitamin-D 88405 units weekly  He is now taking 2000 International Units daily    Check a vitamin-D level with his next visit

## 2022-08-26 NOTE — ASSESSMENT & PLAN NOTE
History of hyperlipidemia with diabetes  Patient remains on atorvastatin 10 mg daily  Cholesterol showing excellent control  Patient admits that he is not always perfect with his diet but again we did stress the importance of watching his intake of fats and cholesterol    We did discuss with the patient that with a history of diabetes he is at increased risk for coronary artery disease stroke, peripheral vascular disease

## 2022-08-26 NOTE — ASSESSMENT & PLAN NOTE
Patient is complaining of pruritic dermatitis behind both ears worse on right than left and also to his anterior chest wall  Patient has a slightly raised erythematous rash noted to all areas and seem to be confluent behind the ears and to his chest wall  Again he states this is pruritic and initially patient was placed on Lotrisone cream to apply to the area twice a day as needed    Will call if not improving

## 2022-08-26 NOTE — ASSESSMENT & PLAN NOTE
Patient is a 72-year-old male history of multiple medical problems including diabetes mellitus type 2, essential hypertension, hyperlipidemia  Patient is here today for repeat Medicare wellness visit  He did have extensive lab testing performed prior to the visit today we did discuss the results of length of the patient  Patient states in general he is doing well and he remains physically active  Denies any chest pain or pressure no increasing shortness of breath exertion  He states he is trying to be compliant with his diet and his blood sugar levels have been stable  Patient did undergo full physical exam today in the office including his right exam and prostate exam   Decision today will be for the patient to continue present medication and surveillance and he knows to call if any new problems or concerns  We will check a fasting blood sugar hemoglobin A1c with his next visit

## 2022-08-26 NOTE — PROGRESS NOTES
Assessment and Plan:     Problem List Items Addressed This Visit    None          Preventive health issues were discussed with patient, and age appropriate screening tests were ordered as noted in patient's After Visit Summary  Personalized health advice and appropriate referrals for health education or preventive services given if needed, as noted in patient's After Visit Summary       History of Present Illness:     Patient presents for a Medicare Wellness Visit    HPI   Patient Care Team:  Beau Carpio DO as PCP - Danita Abdalla DO as PCP - Jason (JAIDEN)  Beau Carpio DO     Review of Systems:     Review of Systems     Problem List:     Patient Active Problem List   Diagnosis    Hyperlipidemia    Hypertension    [facility]    Vitamin D deficiency    Healthcare maintenance    Acute atopic conjunctivitis of both eyes    Overweight    Acute non-recurrent frontal sinusitis    Bronchitis    Occult blood in stools    Left chronic serous otitis media    Anaphylaxis    Oral thrush      Past Medical and Surgical History:     Past Medical History:   Diagnosis Date    Diabetes [facility]     [facility]     Hyperlipidemia     Hypertension      Past Surgical History:   Procedure Laterality Date    APPENDECTOMY        Family History:     Family History   Problem Relation Age of Onset   Tomie Coop Parkinsonism Mother     Diabetes Father     Pancreatic cancer Father     Colon cancer Father     Hypertension Family     Hearing loss Family       Social History:     Social History     Socioeconomic History    Marital status: /Civil Union     Spouse name: Not on file    Number of children: Not on file    Years of education: Not on file    Highest education level: Not on file   Occupational History    Not on file   Tobacco Use    Smoking status: Former Smoker     Types: Cigars    Smokeless tobacco: Never Used   Vaping Use    Vaping Use: Never used   Substance and Sexual Activity    Alcohol use: Yes     Alcohol/week: 7 0 standard drinks     Types: 7 Glasses of wine per week     Comment: wine    Drug use: Never    Sexual activity: Not on file   Other Topics Concern    Not on file   Social History Narrative    Not on file     Social Determinants of Health     Financial Resource Strain: Not on file   Food Insecurity: Not on file   Transportation Needs: Not on file   Physical Activity: Not on file   Stress: Not on file   Social Connections: Not on file   Intimate Partner Violence: Not on file   Housing Stability: Not on file      Medications and Allergies:     Current Outpatient Medications   Medication Sig Dispense Refill    albuterol (PROVENTIL HFA,VENTOLIN HFA) 90 mcg/act inhaler Inhale 2 puffs every 6 (six) hours as needed for wheezing or shortness of breath 1 Inhaler 5    atorvastatin (LIPITOR) 10 mg tablet Take 1 tablet (10 mg total) by mouth daily 90 tablet 3    EPINEPHrine (EPIPEN) 0 3 mg/0 3 mL SOAJ Inject 0 3 mL (0 3 mg total) into a muscle once for 1 dose 0 6 mL 3    ergocalciferol (VITAMIN D2) 50,000 units Take by mouth      fluticasone (FLONASE) 50 mcg/act nasal spray 1 spray into each nostril daily      glucose blood (OneTouch Ultra) test strip Test blood sugars daily 100 each 3    losartan (COZAAR) 50 mg tablet Take 1 tablet (50 mg total) by mouth daily 90 tablet 2    metFORMIN (GLUCOPHAGE-XR) 500 mg 24 hr tablet Take 1 tablet (500 mg total) by mouth 2 (two) times a day with meals (Patient taking differently: Take 500 mg by mouth daily with breakfast) 180 tablet 3     No current facility-administered medications for this visit       Allergies   Allergen Reactions    Bee Venom Syncope      Immunizations:     Immunization History   Administered Date(s) Administered    COVID-19 MODERNA VACC 0 5 ML IM 02/19/2021, 03/19/2021, 10/30/2021, 04/19/2022    INFLUENZA 10/01/2015, 09/26/2016, 09/29/2017, 09/26/2018, 11/01/2018    Influenza, high dose seasonal 0 7 mL 09/23/2020, 12/09/2021    Pneumococcal Conjugate 13-Valent 12/09/2021      Health Maintenance:         Topic Date Due    Hepatitis C Screening  Never done    Colorectal Cancer Screening  08/09/2026         Topic Date Due    Influenza Vaccine (1) 09/01/2022      Medicare Screening Tests and Risk Assessments:     Anjana Lawrence is here for his Subsequent Wellness visit  Last Medicare Wellness visit information reviewed, patient interviewed and updates made to the record today  Health Risk Assessment:   Patient rates overall health as very good  Patient feels that their physical health rating is same  Patient is very satisfied with their life  Eyesight was rated as slightly worse  Hearing was rated as same  Patient feels that their emotional and mental health rating is much better  Patients states they are never, rarely angry  Patient states they are sometimes unusually tired/fatigued  Pain experienced in the last 7 days has been some  Patient's pain rating has been 3/10  Patient states that he has experienced no weight loss or gain in last 6 months  Fall Risk Screening: In the past year, patient has experienced: no history of falling in past year      Home Safety:  Patient does not have trouble with stairs inside or outside of their home  Patient has working smoke alarms and has working carbon monoxide detector  Home safety hazards include: none  Nutrition:   Current diet is Diabetic  Medications:   Patient is not currently taking any over-the-counter supplements  Patient is able to manage medications  Activities of Daily Living (ADLs)/Instrumental Activities of Daily Living (IADLs):   Walk and transfer into and out of bed and chair?: Yes  Dress and groom yourself?: Yes    Bathe or shower yourself?: Yes    Feed yourself?  Yes  Do your laundry/housekeeping?: Yes  Manage your money, pay your bills and track your expenses?: Yes  Make your own meals?: Yes    Do your own shopping?: Yes    Previous Hospitalizations:   Any hospitalizations or ED visits within the last 12 months?: Yes    How many hospitalizations have you had in the last year?: 1-2    Advance Care Planning:   Living will: Yes    Durable POA for healthcare: Yes    Advanced directive: Yes      PREVENTIVE SCREENINGS      Cardiovascular Screening:    General: Screening Not Indicated and History Lipid Disorder      Diabetes Screening:     General: Screening Not Indicated and History Diabetes      Colorectal Cancer Screening:     General: Screening Current      Prostate Cancer Screening:    General: Screening Current      Abdominal Aortic Aneurysm (AAA) Screening:    Risk factors include: age between 73-69 yo and tobacco use        Lung Cancer Screening:     General: Screening Not Indicated    Screening, Brief Intervention, and Referral to Treatment (SBIRT)    Screening  Typical number of drinks in a day: 3  Typical number of drinks in a week: 21  Interpretation: Low risk drinking behavior  AUDIT-C Screenin) How often did you have a drink containing alcohol in the past year? 2 to 3 times a week  2) How many drinks did you have on a typical day when you were drinking in the past year? 3 to 4  3) How often did you have 6 or more drinks on one occasion in the past year? daily or almost daily    AUDIT-C Score: 7  Interpretation: Score 4-12 (male): POSITIVE screen for alcohol misuse    AUDIT Screenin) How often during the last year have you found that you were not able to stop drinking once you had started? 0 - never  5) How often during the last year have you failed to do what was normally expected from you because of drinking? 0 - never  6) How often during the last year have you needed a first drink in the morning to get yourself going after a heavy drinking session?  0 - never  7) How often during the last year have you had a feeling of guilt or remorse after drinking? 0 - never  8) How often during the last year have you been unable to remember what happened the night before because you had been drinking? 0 - never  9) Have you or someone else been injured as a result of your drinking? 0 - no  10) Has a relative or friend or a doctor or another health worker been concerned about your drinking or suggested you cut down? 0 - no    AUDIT Score: 7  Interpretation: Low risk alcohol consumption    Single Item Drug Screening:  How often have you used an illegal drug (including marijuana) or a prescription medication for non-medical reasons in the past year? never    Single Item Drug Screen Score: 0  Interpretation: Negative screen for possible drug use disorder    No exam data present     Physical Exam:     There were no vitals taken for this visit      Physical Exam     Carter Merchant, DO

## 2022-08-26 NOTE — PROGRESS NOTES
Assessment/Plan:    Healthcare maintenance  Patient is a 44-year-old male history of multiple medical problems including diabetes mellitus type 2, essential hypertension, hyperlipidemia  Patient is here today for repeat Medicare wellness visit  He did have extensive lab testing performed prior to the visit today we did discuss the results of length of the patient  Patient states in general he is doing well and he remains physically active  Denies any chest pain or pressure no increasing shortness of breath exertion  He states he is trying to be compliant with his diet and his blood sugar levels have been stable  Patient did undergo full physical exam today in the office including his right exam and prostate exam   Decision today will be for the patient to continue present medication and surveillance and he knows to call if any new problems or concerns  We will check a fasting blood sugar hemoglobin A1c with his next visit  Acute pain of left shoulder  Patient is complaining of some acute discomfort in his left shoulder  No specific accident or injury  On evaluation patient has some minor pain with movement and rotation to his left shoulder girdle with mostly discomfort to the area of the biceps tendon insertion at the shoulder  Patient does not have any weakness in the shoulder with movement or against resistance  No gross deformity  States that this is been nagging discomfort for few weeks and wishes to be seen by Orthopedics for evaluation consult has been sent  Dermatitis  Patient is complaining of pruritic dermatitis behind both ears worse on right than left and also to his anterior chest wall  Patient has a slightly raised erythematous rash noted to all areas and seem to be confluent behind the ears and to his chest wall  Again he states this is pruritic and initially patient was placed on Lotrisone cream to apply to the area twice a day as needed    Will call if not improving    Hyperlipidemia  History of hyperlipidemia with diabetes  Patient remains on atorvastatin 10 mg daily  Cholesterol showing excellent control  Patient admits that he is not always perfect with his diet but again we did stress the importance of watching his intake of fats and cholesterol  We did discuss with the patient that with a history of diabetes he is at increased risk for coronary artery disease stroke, peripheral vascular disease    Hypertension  Patient does have a history of mild hypertension  He remains on losartan at 50 mg daily  His blood pressure showing excellent control renal function is stable  Will continue present medication  Type 2 diabetes mellitus with hyperglycemia ([facility] )  Patient does have a longstanding history of diabetes mellitus type 2  Remains on metformin and as noted his sugar is showing adequate control with present treatment  Despite this we did discuss again with the patient the importance of watching his intake of concentrated sweets and simple carbohydrates and calories overall  We performed diabetic foot exam today and we discussed with the patient importance of regular eye care  Check a fasting blood sugar hemoglobin A1c with his next visit    Lab Results   Component Value Date    HGBA1C 6 1 (H) 08/23/2022       Vitamin D deficiency  Patient does have a history of vitamin-D deficiency  Previously had been taking a large dose of vitamin-D 60419 units weekly  He is now taking 2000 International Units daily  Check a vitamin-D level with his next visit       Diagnoses and all orders for this visit:    Acute pain of left shoulder  -     Ambulatory Referral to Orthopedic Surgery; Future    Type 2 diabetes mellitus with hyperglycemia, without long-term current use of insulin (HCC)  -     Hemoglobin A1C; Future    Vitamin D deficiency    Primary hypertension  -     Basic metabolic panel;  Future    Familial hypercholesterolemia    Healthcare maintenance    Dermatitis  -     clotrimazole-betamethasone (LOTRISONE) 1-0 05 % cream; Apply topically 2 (two) times a day          Subjective:      Patient ID: Drea Santacruz is a 73 y o  male  Patient is 62-year-old male history of medical problems as outlined previously including hypertension, hyperlipidemia, diabetes mellitus type 2  Patient is here today for repeat Medicare wellness visit and did have extensive lab testing performed prior to visit today and we did discuss the results  Patient states in general feeling well and offers no new complaints other than some discomfort his left shoulder  And patient also has per rash behind the ears worse on the right than the left and anterior chest wall      The following portions of the patient's history were reviewed and updated as appropriate:   He  has a past medical history of Allergic (Bees), Diabetes ([address] ), Diabetes mellitus ([address] ), HL (hearing loss) (11/11/2020), Hyperlipidemia, and Hypertension  He   Patient Active Problem List    Diagnosis Date Noted    Acute pain of left shoulder 08/26/2022    Dermatitis 08/26/2022    Oral thrush 07/18/2022    Anaphylaxis 12/09/2021    Left chronic serous otitis media 08/13/2020    Occult blood in stools 01/27/2020    Acute non-recurrent frontal sinusitis 01/17/2020    Bronchitis 01/17/2020    Overweight 04/08/2019    Acute atopic conjunctivitis of both eyes 12/03/2018    Healthcare maintenance 09/07/2018    Hypertension 10/21/2016    Vitamin D deficiency 03/03/2015    Type 2 diabetes mellitus with hyperglycemia ([address] ) 11/20/2013    Hyperlipidemia 09/18/2012     He  has a past surgical history that includes Appendectomy  His family history includes Arthritis in his mother; Asthma in his father; Cancer in his father; Colon cancer in his father; Diabetes in his father; Hearing loss in his family and mother; Hypertension in his family; Pancreatic cancer in his father; Parkinsonism in his mother    He reports that he quit smoking about 23 years ago  His smoking use included cigars  He has a 25 00 pack-year smoking history  He has never used smokeless tobacco  He reports current alcohol use of about 3 0 standard drinks of alcohol per week  He reports that he does not use drugs  Current Outpatient Medications   Medication Sig Dispense Refill    albuterol (PROVENTIL HFA,VENTOLIN HFA) 90 mcg/act inhaler Inhale 2 puffs every 6 (six) hours as needed for wheezing or shortness of breath 1 Inhaler 5    atorvastatin (LIPITOR) 10 mg tablet Take 1 tablet (10 mg total) by mouth daily 90 tablet 3    clotrimazole-betamethasone (LOTRISONE) 1-0 05 % cream Apply topically 2 (two) times a day 30 g 4    ergocalciferol (VITAMIN D2) 50,000 units Take by mouth      fluticasone (FLONASE) 50 mcg/act nasal spray 1 spray into each nostril daily      glucose blood (OneTouch Ultra) test strip Test blood sugars daily 100 each 3    losartan (COZAAR) 50 mg tablet Take 1 tablet (50 mg total) by mouth daily 90 tablet 2    metFORMIN (GLUCOPHAGE-XR) 500 mg 24 hr tablet Take 1 tablet (500 mg total) by mouth 2 (two) times a day with meals (Patient taking differently: Take 500 mg by mouth daily with breakfast) 180 tablet 3    EPINEPHrine (EPIPEN) 0 3 mg/0 3 mL SOAJ Inject 0 3 mL (0 3 mg total) into a muscle once for 1 dose 0 6 mL 3     No current facility-administered medications for this visit       Current Outpatient Medications on File Prior to Visit   Medication Sig    albuterol (PROVENTIL HFA,VENTOLIN HFA) 90 mcg/act inhaler Inhale 2 puffs every 6 (six) hours as needed for wheezing or shortness of breath    atorvastatin (LIPITOR) 10 mg tablet Take 1 tablet (10 mg total) by mouth daily    ergocalciferol (VITAMIN D2) 50,000 units Take by mouth    fluticasone (FLONASE) 50 mcg/act nasal spray 1 spray into each nostril daily    glucose blood (OneTouch Ultra) test strip Test blood sugars daily    losartan (COZAAR) 50 mg tablet Take 1 tablet (50 mg total) by mouth daily    metFORMIN (GLUCOPHAGE-XR) 500 mg 24 hr tablet Take 1 tablet (500 mg total) by mouth 2 (two) times a day with meals (Patient taking differently: Take 500 mg by mouth daily with breakfast)    EPINEPHrine (EPIPEN) 0 3 mg/0 3 mL SOAJ Inject 0 3 mL (0 3 mg total) into a muscle once for 1 dose     No current facility-administered medications on file prior to visit  He is allergic to bee venom       Review of Systems   Constitutional: Negative  HENT: Negative  Eyes: Negative  Respiratory: Negative  Cardiovascular: Negative  Gastrointestinal: Negative  Endocrine: Negative  Genitourinary: Negative  Musculoskeletal: Positive for arthralgias  Negative for back pain, gait problem, joint swelling, myalgias, neck pain and neck stiffness  Skin: Positive for rash  Negative for color change, pallor and wound  Allergic/Immunologic: Negative  Neurological: Negative  Hematological: Negative  Psychiatric/Behavioral: Negative  Objective:      /64 (BP Location: Left arm, Patient Position: Sitting, Cuff Size: Adult)   Pulse 71   Temp 97 6 °F (36 4 °C) (Tympanic)   Resp 18   Ht 5' 9" (1 753 m)   Wt 83 5 kg (184 lb)   SpO2 97%   BMI 27 17 kg/m²          Physical Exam  Vitals and nursing note reviewed  Constitutional:       General: He is not in acute distress  Appearance: Normal appearance  He is not ill-appearing, toxic-appearing or diaphoretic  Comments: A pleasant articulate healthy-appearing but mildly overweight 24-year-old male who is awake alert no acute distress oriented x3   HENT:      Head: Normocephalic and atraumatic  Right Ear: Tympanic membrane, ear canal and external ear normal  There is no impacted cerumen  Left Ear: Tympanic membrane, ear canal and external ear normal  There is no impacted cerumen  Nose: Nose normal  No congestion or rhinorrhea        Mouth/Throat:      Mouth: Mucous membranes are moist       Pharynx: Oropharynx is clear  No oropharyngeal exudate or posterior oropharyngeal erythema  Eyes:      General: No scleral icterus  Right eye: No discharge  Left eye: No discharge  Extraocular Movements: Extraocular movements intact  Conjunctiva/sclera: Conjunctivae normal       Pupils: Pupils are equal, round, and reactive to light  Neck:      Vascular: No carotid bruit  Cardiovascular:      Rate and Rhythm: Normal rate and regular rhythm  Pulses: Normal pulses  Heart sounds: Normal heart sounds  No murmur heard  No friction rub  No gallop  Pulmonary:      Effort: Pulmonary effort is normal  No respiratory distress  Breath sounds: Normal breath sounds  No stridor  No wheezing, rhonchi or rales  Chest:      Chest wall: No tenderness  Abdominal:      General: Bowel sounds are normal  There is no distension  Palpations: Abdomen is soft  There is no mass  Tenderness: There is no abdominal tenderness  There is no right CVA tenderness, left CVA tenderness, guarding or rebound  Hernia: No hernia is present  Genitourinary:     Penis: Normal        Testes: Normal       Prostate: Normal       Rectum: Normal  Guaiac result negative  Musculoskeletal:         General: Tenderness (A slight tenderness to palpation the area insertion biceps tendon to his left shoulder  No crepitus with movement full range of motion no weakness but he states continued discomfort) present  No swelling, deformity or signs of injury  Normal range of motion  Cervical back: Normal range of motion and neck supple  No rigidity or tenderness  Right lower leg: No edema  Left lower leg: No edema  Lymphadenopathy:      Cervical: No cervical adenopathy  Skin:     General: Skin is warm and dry  Capillary Refill: Capillary refill takes less than 2 seconds  Coloration: Skin is not jaundiced        Findings: Rash ( slightly raised erythematous rash with dry skin line both the ears worse on the right than left  Similar rash to anterior chest wall which she states is pruritic) present  No bruising or lesion  Neurological:      General: No focal deficit present  Mental Status: He is alert and oriented to person, place, and time  Mental status is at baseline  Cranial Nerves: No cranial nerve deficit  Sensory: No sensory deficit  Motor: No weakness  Coordination: Coordination normal       Gait: Gait normal       Deep Tendon Reflexes: Reflexes normal    Psychiatric:         Mood and Affect: Mood normal          Thought Content:  Thought content normal          Judgment: Judgment normal

## 2022-08-26 NOTE — ASSESSMENT & PLAN NOTE
Patient does have a longstanding history of diabetes mellitus type 2  Remains on metformin and as noted his sugar is showing adequate control with present treatment  Despite this we did discuss again with the patient the importance of watching his intake of concentrated sweets and simple carbohydrates and calories overall  We performed diabetic foot exam today and we discussed with the patient importance of regular eye care    Check a fasting blood sugar hemoglobin A1c with his next visit    Lab Results   Component Value Date    HGBA1C 6 1 (H) 08/23/2022

## 2022-08-26 NOTE — ASSESSMENT & PLAN NOTE
Patient does have a history of mild hypertension  He remains on losartan at 50 mg daily  His blood pressure showing excellent control renal function is stable  Will continue present medication

## 2022-08-29 ENCOUNTER — TELEPHONE (OUTPATIENT)
Dept: ADMINISTRATIVE | Facility: OTHER | Age: 72
End: 2022-08-29

## 2022-08-29 NOTE — TELEPHONE ENCOUNTER
----- Message from Chantell Acevedo sent at 8/26/2022  1:35 PM EDT -----  Regarding: eye exam  08/26/22 1:36 PM    Hello, our patient Gracia Caballero has had Eye Exam  completed/performed   Please assist in updating the patient chart by media The date of service is 05/11/2022    Thank you,  Tami Swanson MA  [facility] INTERNAL MED

## 2022-08-30 NOTE — TELEPHONE ENCOUNTER
Upon review of the In Basket request we were able to locate, review, and update the patient chart as requested for Diabetic Eye Exam     Any additional questions or concerns should be emailed to the Practice Liaisons via [email or web address] email, please do not reply via In Basket      Thank you  Laurent Clark MA

## 2022-09-01 ENCOUNTER — OFFICE VISIT (OUTPATIENT)
Dept: OBGYN CLINIC | Facility: OTHER | Age: 72
End: 2022-09-01
Payer: MEDICARE

## 2022-09-01 ENCOUNTER — APPOINTMENT (OUTPATIENT)
Dept: RADIOLOGY | Facility: OTHER | Age: 72
End: 2022-09-01
Payer: MEDICARE

## 2022-09-01 DIAGNOSIS — M25.512 ACUTE PAIN OF LEFT SHOULDER: ICD-10-CM

## 2022-09-01 DIAGNOSIS — M75.42 SUBACROMIAL IMPINGEMENT OF LEFT SHOULDER: Primary | ICD-10-CM

## 2022-09-01 PROCEDURE — 99204 OFFICE O/P NEW MOD 45 MIN: CPT | Performed by: ORTHOPAEDIC SURGERY

## 2022-09-01 PROCEDURE — 73030 X-RAY EXAM OF SHOULDER: CPT

## 2022-09-01 NOTE — PROGRESS NOTES
Assessment  Diagnoses and all orders for this visit:    Subacromial impingement of left shoulder    Discussion and Plan:    · The patient has an examination consistent with subacromial impingement syndrome of the left shoulder  I have discussed with the patient the pathophysiology of this diagnosis and reviewed how the examination correlates with this diagnosis  Treatment options were discussed at length and after discussing these treatment options, the patient denied wanting receive a subacromial injection of corticosteroid  This may be performed in the future if his symptoms warrant  · He was provided with a prescription for referral to physical therapy  · We will reevaluate the patient in 6-8 weeks  If the symptoms fail to improve with this treatment the patient would be indicated for further imaging in the form of an MRI scan of the shoulder  Subjective:   Patient ID: Dorothea Steele is a 73 y o  male    The patient presents with a chief complaint of left shoulder pain  The pain began a few month(s) ago and is not associated with an acute injury  The patient describes the pain as aching and dull in intensity,  intermittent, occurring with increasing frequency in timing, and localizes the pain to the  left subacromial joint, deltoid  The pain is worse with overhead work, overuse and raising arm over head and relieved by rest, ice, avoiding the painful activities  The pain is not associated with numbness and tingling  The pain is not associated with constitutional symptoms  The patient is awoken at night by the pain  The patient has had no prior treatment  The following portions of the patient's history were reviewed and updated as appropriate: allergies, current medications, past family history, past medical history, past social history, past surgical history and problem list         Objective: There were no vitals taken for this visit        Left Shoulder Exam     Tenderness   The patient is experiencing no tenderness  Range of Motion   The patient has normal left shoulder ROM  Muscle Strength   Abduction: 4/5     Tests   Chinchilla test: positive  Impingement: positive  Drop arm: negative    Other   Erythema: absent  Sensation: normal  Pulse: present             Physical Exam  Constitutional:       General: He is not in acute distress  Appearance: He is well-developed  Eyes:      Conjunctiva/sclera: Conjunctivae normal       Pupils: Pupils are equal, round, and reactive to light  Cardiovascular:      Rate and Rhythm: Normal rate and regular rhythm  Pulmonary:      Effort: Pulmonary effort is normal       Breath sounds: Normal breath sounds  Abdominal:      General: Bowel sounds are normal       Palpations: Abdomen is soft  Musculoskeletal:      Cervical back: Normal range of motion and neck supple  Skin:     General: Skin is warm and dry  Findings: No erythema or rash  Neurological:      Mental Status: He is alert and oriented to person, place, and time  Deep Tendon Reflexes: Reflexes are normal and symmetric  Psychiatric:         Behavior: Behavior normal            I have personally reviewed pertinent films in PACS and my interpretation is as follows  X Ray Left Shoulder 9/1/2022: No acute osseous abnormalities or degenerative changes       Scribe Attestation    I,:  Radha Michaud am acting as a scribe while in the presence of the attending physician :       I,:  Kandi Cuadra MD personally performed the services described in this documentation    as scribed in my presence :

## 2022-09-08 ENCOUNTER — EVALUATION (OUTPATIENT)
Dept: PHYSICAL THERAPY | Facility: REHABILITATION | Age: 72
End: 2022-09-08
Payer: MEDICARE

## 2022-09-08 DIAGNOSIS — M75.42 SUBACROMIAL IMPINGEMENT OF LEFT SHOULDER: ICD-10-CM

## 2022-09-08 DIAGNOSIS — M25.512 ACUTE PAIN OF LEFT SHOULDER: Primary | ICD-10-CM

## 2022-09-08 PROCEDURE — 97161 PT EVAL LOW COMPLEX 20 MIN: CPT | Performed by: PHYSICAL THERAPIST

## 2022-09-08 PROCEDURE — 97110 THERAPEUTIC EXERCISES: CPT | Performed by: PHYSICAL THERAPIST

## 2022-09-08 PROCEDURE — 97112 NEUROMUSCULAR REEDUCATION: CPT | Performed by: PHYSICAL THERAPIST

## 2022-09-08 NOTE — PROGRESS NOTES
PT Evaluation     Today's date: 2022  Patient name: Jamia Vargas  : 1950  MRN: [id number]  Referring provider: Sunni Carmichael  Dx:   Encounter Diagnosis     ICD-10-CM    1  Acute pain of left shoulder  M25 512    2  Subacromial impingement of left shoulder  M75 42 Ambulatory Referral to Physical Therapy       Start Time: 1535  Stop Time: 1635  Total time in clinic (min): 60 minutes    Assessment  Assessment details: Jamia Vargas is a 73 y o  male presenting with subacute L shoulder pain consistent with LHB tendinopathy and SAIS  Primary impairments include painfull arc, weakness, ROM limitation, and motor control dysfunction    Will benefit from skilled PT interventions for return to PLOF  HEP was provided and reviewed  Patient is able to complete HEP with good technique and appropriate pain response  Patient expressed understanding of appropriate dosage and frequency of HEP  No additional referral necessary  Impairments: abnormal coordination, abnormal muscle firing, abnormal or restricted ROM, abnormal movement, activity intolerance, impaired balance, impaired physical strength, lacks appropriate home exercise program, pain with function, poor posture  and poor body mechanics  Functional limitations: Oh activity, reaching, cross body, housework  Symptom irritability: moderateUnderstanding of Dx/Px/POC: good   Prognosis: good    Goals    Short Term Goals: In 3 weeks, the patient will:  1  Full pain free SH ROM  2  SH mmt to 3-/5  3  Supervision with HEP for self care    Long Term Goals: In 6 weeks, the patient will:  1  SH MMT to 5/5   2  FOTO to greater than predicted value  3   Independent with HEP for selfcare      Plan  Patient would benefit from: skilled physical therapy  Planned therapy interventions: joint mobilization, manual therapy, massage, Gilliam taping, neuromuscular re-education, patient education, postural training, self care, strengthening, stretching, therapeutic activities, therapeutic exercise, therapeutic training, graded exercise, graded activity, home exercise program, flexibility, functional ROM exercises, balance and activity modification  Treatment plan discussed with: patient        Subjective  Pain Location: L shoulder pain  Pain Intensity: 0 at rest, intermittent pain, 2/10  JENY: 5 month hx, no known JENY  Provoked by: Cross body, OH activity, showering  Eased Positions: rest  Constitutional S/S: denies   Dominant Hand: R  Occupation:   Goals: reduce pain, improve function,     Objective    Vitals: 166/80    Red Flags: none    Standing         Head Position x Protracted  Neutral  Retracted   Scapular Position x Protracted  Neutral  Retracted   Thoracic Spine x Inc Kyphosis  Neutral     Lumbar Spine x Inc Lordosis  Neutral  Dec Lordosis     Strength and ROM evaluated B from a regional biomechanical perspective and values relevant to this episode recorded in table below    ROM: Goniometric measurement revealed the following findings  Shoulder ROM Left   Flexion 180 painful arc   Abduction 180 painful arc   ER WNL   IR L2     Strength: MMT revealed the following findings    Joint Motion Left   Sh  Flexion 4-/5   Sh  Abduction 4+/5   Sh  ER 5/5   Sh  IR 5/5     Additional Assessments:  Palpation: Mild TTP LHB  Joint mobility: limited inferior glides    Shoulder Specific Special Tests:                                                                                                                                Test / Measure  Left 9/8/2022   Amor-José Miguel +   Painful Arc +   Infraspinatus Strength Test -   Drop Arm -   Supraspinatus (empty can) -   Neer +   Sulcus  -   Biceps Load II -   Elizabeth Relocation -   Clunk -        Precautions: DMII,     Pertinent Findings:                                                                                                                                                     Test / Measure  9/8/2022   FOTO 72   Flexion mmt  4-/5   SH IR ROM L2     Manuals 9/8                   Neuro Re-Ed    SRER 2x10 gtg   TB ER 2x10 gtb   TB Row    TB I    IR Strap S 3x30"   HEP Review 5'       Ther Ex    UBE 4/4                               Ther Activity            Gait Training            Modalities

## 2022-09-14 ENCOUNTER — OFFICE VISIT (OUTPATIENT)
Dept: PHYSICAL THERAPY | Facility: REHABILITATION | Age: 72
End: 2022-09-14
Payer: MEDICARE

## 2022-09-14 DIAGNOSIS — M25.512 ACUTE PAIN OF LEFT SHOULDER: ICD-10-CM

## 2022-09-14 DIAGNOSIS — M75.42 SUBACROMIAL IMPINGEMENT OF LEFT SHOULDER: Primary | ICD-10-CM

## 2022-09-14 PROCEDURE — 97112 NEUROMUSCULAR REEDUCATION: CPT | Performed by: PHYSICAL THERAPIST

## 2022-09-14 PROCEDURE — 97110 THERAPEUTIC EXERCISES: CPT | Performed by: PHYSICAL THERAPIST

## 2022-09-14 NOTE — PROGRESS NOTES
Daily Note     Today's date: 2022  Patient name: Kandy Busch  : 1950  MRN: [id number]  Referring provider: Yvonne Montilla  Dx:   Encounter Diagnosis     ICD-10-CM    1  Subacromial impingement of left shoulder  M75 42    2  Acute pain of left shoulder  M25 512        Start Time: 1500  Stop Time: 1545  Total time in clinic (min): 45 minutes    Subjective: Sore generally post initial eval x 2 days  Objective: See treatment diary below      Assessment: Tolerated treatment well  Patient would benefit from continued PT 1:1 with Johan Rincon DPT for entirety of tx  Pt progressing well, explained DOMS and expectations  IR ROM improving  Plan: Continue per plan of care        Precautions: DMII,     Pertinent Findings:                                                                                                                                                     Test / Measure  2022   FOTO 72   Flexion mmt  4-/5   SH IR ROM L2     Manuals                        Neuro Re-Ed     SRER 2x10 gtg 2x10 gtg   TB ER 2x10 gtb    IR Strap S 3x30" 3x30"   HEP Review 5'    [place] Row  3x10 25#   [place] I  3x10 17#   Isometric ABD + Flexion AROM  3x10 ytb   Face Pull  3x10 12#   Ther Ex     UBE 4/4 4/4   SH flexion to 90  3x10 2#   SH Abd to 90  3x10 2#                            Ther Activity               Gait Training               Modalities

## 2022-09-16 ENCOUNTER — OFFICE VISIT (OUTPATIENT)
Dept: PHYSICAL THERAPY | Facility: REHABILITATION | Age: 72
End: 2022-09-16
Payer: MEDICARE

## 2022-09-16 DIAGNOSIS — I10 ESSENTIAL HYPERTENSION: ICD-10-CM

## 2022-09-16 DIAGNOSIS — M75.42 SUBACROMIAL IMPINGEMENT OF LEFT SHOULDER: ICD-10-CM

## 2022-09-16 DIAGNOSIS — M25.512 ACUTE PAIN OF LEFT SHOULDER: Primary | ICD-10-CM

## 2022-09-16 PROCEDURE — 97110 THERAPEUTIC EXERCISES: CPT | Performed by: PHYSICAL THERAPIST

## 2022-09-16 PROCEDURE — 97112 NEUROMUSCULAR REEDUCATION: CPT | Performed by: PHYSICAL THERAPIST

## 2022-09-16 RX ORDER — LOSARTAN POTASSIUM 50 MG/1
TABLET ORAL
Qty: 90 TABLET | Refills: 2 | Status: SHIPPED | OUTPATIENT
Start: 2022-09-16

## 2022-09-16 NOTE — PROGRESS NOTES
Daily Note     Today's date: 2022  Patient name: Sonia Fernandez  : 1950  MRN: [id number]  Referring provider: Mason Vang  Dx:   Encounter Diagnosis     ICD-10-CM    1  Acute pain of left shoulder  M25 512    2  Subacromial impingement of left shoulder  M75 42        Start Time: 0915  Stop Time: 1000  Total time in clinic (min): 45 minutes    Subjective: Feeling progression, general soreness  Objective: See treatment diary below      Assessment: Tolerated treatment well  Patient would benefit from continued PT 1:1 with Suyapa Conklin DPT for entirety of tx  Pt progressing well, improved exercise and loading tolerance  Continue to progress periscapular and cuff loading as tolerated  Plan: Continue per plan of care        Precautions: DMII,     Pertinent Findings:                                                                                                                                                     Test / Measure  2022   FOTO 72   Flexion mmt  4-/5   SH IR ROM L2     Manuals                            Neuro Re-Ed      SRER 2x10 gtg 2x10 gtg 2x10 gtb   TB ER 2x10 gtb     IR Strap S 3x30" 3x30" 3x30"   HEP Review 5'     [place] Row  3x10 25# 3x10 25#   [place] I  3x10 17# 3x10 20#   Isometric ABD + Flexion AROM  3x10 ytb 3x10 rtb   Face Pull  3x10 12# 3x10 14#   D1 ext   3x10 9#   Ther Ex      UBE /4 4 /4   SH flexion to 90  3x10 2# 3x10 3#   SH Abd to 90  3x10 2# 3x10 3#                                 Ther Activity                  Gait Training                  Modalities

## 2022-09-20 ENCOUNTER — OFFICE VISIT (OUTPATIENT)
Dept: PHYSICAL THERAPY | Facility: REHABILITATION | Age: 72
End: 2022-09-20
Payer: MEDICARE

## 2022-09-20 DIAGNOSIS — M25.512 ACUTE PAIN OF LEFT SHOULDER: ICD-10-CM

## 2022-09-20 DIAGNOSIS — M75.42 SUBACROMIAL IMPINGEMENT OF LEFT SHOULDER: Primary | ICD-10-CM

## 2022-09-20 PROCEDURE — 97112 NEUROMUSCULAR REEDUCATION: CPT

## 2022-09-20 PROCEDURE — 97110 THERAPEUTIC EXERCISES: CPT

## 2022-09-20 NOTE — PROGRESS NOTES
Daily Note     Today's date: 2022  Patient name: Ambrocio Enrique  : 1950  MRN: [id number]  Referring provider: Pollo Henry  Dx:   Encounter Diagnosis     ICD-10-CM    1  Subacromial impingement of left shoulder  M75 42    2  Acute pain of left shoulder  M25 512                   Subjective: Pt reports no pain currently, he reports overall his shoulder feels like its getting better  Objective: See treatment diary below      Assessment: Tolerated treatment well  He reports no aggravation of sx with exercises today  Good tolerance for exercises at current difficulty  He reports no pain after session, and states the stretches felt good to do last  Mild soreness and fatigue felt after session  Patient exhibited good technique with therapeutic exercises and would benefit from continued PT      Plan: Continue per plan of care        Precautions: DMII,     Pertinent Findings:                                                                                                                                                     Test / Measure  2022   FOTO 72   Flexion mmt  4-/5   SH IR ROM L2     Manuals                                Neuro Re-Ed       SRER 2x10 gtg 2x10 gtg 2x10 gtb 2x10 gtb   TB ER 2x10 gtb      IR Strap S 3x30" 3x30" 3x30" 3x30"   HEP Review 5'      [place] Row  3x10 25# 3x10 25# 3x10 25#   [place] I  3x10 17# 3x10 20# 3x10 20#   Isometric ABD + Flexion AROM  3x10 ytb 3x10 rtb 3x10 rtb   Face Pull  3x10 12# 3x10 14# 3x10 14#   D1 ext   3x10 9# 3x10 9#   Ther Ex       UBE /4 /4 //4   SH flexion to 90  3x10 2# 3x10 3# 3x10 3#   SH Abd to 90  3x10 2# 3x10 3# 3x10 3#                                      Ther Activity                     Gait Training                     Modalities

## 2022-09-22 ENCOUNTER — OFFICE VISIT (OUTPATIENT)
Dept: PHYSICAL THERAPY | Facility: REHABILITATION | Age: 72
End: 2022-09-22
Payer: MEDICARE

## 2022-09-22 DIAGNOSIS — M25.512 ACUTE PAIN OF LEFT SHOULDER: ICD-10-CM

## 2022-09-22 DIAGNOSIS — M75.42 SUBACROMIAL IMPINGEMENT OF LEFT SHOULDER: Primary | ICD-10-CM

## 2022-09-22 PROCEDURE — 97110 THERAPEUTIC EXERCISES: CPT

## 2022-09-22 PROCEDURE — 97112 NEUROMUSCULAR REEDUCATION: CPT

## 2022-09-22 NOTE — PROGRESS NOTES
Daily Note     Today's date: 2022  Patient name: Eladia Banegas  : 1950  MRN: [id number]  Referring provider: Deepika Harkins  Dx:   Encounter Diagnosis     ICD-10-CM    1  Subacromial impingement of left shoulder  M75 42    2  Acute pain of left shoulder  M25 512        Start Time: 1517  Stop Time: 1610  Total time in clinic (min): 53 minutes    Subjective: Pt reports no change in sx since previous session, he rates discomfort around a 3/10 at rest  He additionally verbalizes no change in medical status since last visit      Objective: See treatment diary below      Assessment: Tolerated treatment well  Pt demonstrates good form with all exercise and was given cues on proper reps/sets throughout session  Pt demonstrates 1/2 grade improvement in LUE 4/5 compared to previous assessment, He additionally presents with increase IR to T10, equal to contralateral UE  Patient exhibited good technique with therapeutic exercises and would benefit from continued PT      Plan: Continue per plan of care        Precautions: DMII,     Pertinent Findings:                                                                                                                                                     Test / Measure  2022   FOTO 72 64   Flexion mmt  4-/5 4/5   SH IR ROM L2 T10     Manuals                                         Neuro Re-Ed         SRER 2x10 gtg 2x10 gtg 2x10 gtb 2x10 gtb 2x10 GTB    TB ER 2x10 gtb        IR Strap S 3x30" 3x30" 3x30" 3x30" 3x30''    HEP Review 5'        [place] Row  3x10 25# 3x10 25# 3x10 25# 3x10 25#    [place] I  3x10 17# 3x10 20# 3x10 20# 3x10 20#    Isometric ABD + Flexion AROM  3x10 ytb 3x10 rtb 3x10 rtb 3x10 RTB    Face Pull  3x10 12# 3x10 14# 3x10 14# 3x10 14#    D1 ext   3x10 9# 3x10 9# 3x10 9#    Ther Ex         UBE 4/4 4/4 4/4 4/4 4'/4' L1 5    SH flexion to 90  3x10 2# 3x10 3# 3x10 3# 3x10 3#    SH Abd to 90  3x10 2# 3x10 3# 3x10 3# 3x10 3#                                                 Ther Activity                           Gait Training                           Modalities

## 2022-09-27 ENCOUNTER — OFFICE VISIT (OUTPATIENT)
Dept: PHYSICAL THERAPY | Facility: REHABILITATION | Age: 72
End: 2022-09-27
Payer: MEDICARE

## 2022-09-27 DIAGNOSIS — M75.42 SUBACROMIAL IMPINGEMENT OF LEFT SHOULDER: ICD-10-CM

## 2022-09-27 DIAGNOSIS — M25.512 ACUTE PAIN OF LEFT SHOULDER: Primary | ICD-10-CM

## 2022-09-27 PROCEDURE — 97110 THERAPEUTIC EXERCISES: CPT | Performed by: PHYSICAL THERAPIST

## 2022-09-27 PROCEDURE — 97112 NEUROMUSCULAR REEDUCATION: CPT | Performed by: PHYSICAL THERAPIST

## 2022-09-27 NOTE — PROGRESS NOTES
Daily Note     Today's date: 2022  Patient name: Ahsan Kline  : 1950  MRN: [id number]  Referring provider: Kristina Kapadia  Dx:   Encounter Diagnosis     ICD-10-CM    1  Acute pain of left shoulder  M25 512    2  Subacromial impingement of left shoulder  M75 42        Start Time: 1045  Stop Time: 1130  Total time in clinic (min): 45 minutes    Subjective: Feeling good, mild soreness while watching celtic games standing with arms crossed  Objective: See treatment diary below    Assessment: Tolerated treatment well  Patient would benefit from continued PT 1:1 with Michelle Rowland DPT for entirety of tx  Pt progressing well, improving strength and ROM, continue to progress capacity  Plan: Continue per plan of care        Precautions: DMII,     Pertinent Findings:                                                                                                                                                     Test / Measure  2022   FOTO 72 64   Flexion mmt  4-/5 4/5   SH IR ROM L2 T10     Manuals                                Neuro Re-Ed       SRER 2x10 gtb 2x10 gtb 2x10 GTB 2x10 MTB   TB ER       IR Strap S 3x30" 3x30" 3x30'' 3x30''   [place] Row 3x10 25# 3x10 25# 3x10 25# 3x10 BTB   [place] Row    3x10 20#   [place] I 3x10 20# 3x10 20# 3x10 20# 3x10 20#   Isometric ABD + Flexion AROM 3x10 rtb 3x10 rtb 3x10 RTB 3x10 RTB   Face Pull 3x10 14# 3x10 14# 3x10 14# 3x10 14#   D1 ext 3x10 9# 3x10 9# 3x10 9# 3x10 9#   90/90 Carry    3x30" 5#   Ther Ex       UBE 4/4 4/4 4'/4' L1 5 4'/4' L1 5   SH flexion to 90 3x10 3# 3x10 3# 3x10 3# 3x10 3#   SH Abd to 90 3x10 3# 3x10 3# 3x10 3# 3x10 3#                                      Ther Activity                     Gait Training                     Modalities

## 2022-09-29 ENCOUNTER — OFFICE VISIT (OUTPATIENT)
Dept: PHYSICAL THERAPY | Facility: REHABILITATION | Age: 72
End: 2022-09-29
Payer: MEDICARE

## 2022-09-29 DIAGNOSIS — M25.512 ACUTE PAIN OF LEFT SHOULDER: Primary | ICD-10-CM

## 2022-09-29 DIAGNOSIS — M75.42 SUBACROMIAL IMPINGEMENT OF LEFT SHOULDER: ICD-10-CM

## 2022-09-29 PROCEDURE — 97112 NEUROMUSCULAR REEDUCATION: CPT | Performed by: PHYSICAL THERAPIST

## 2022-09-29 PROCEDURE — 97110 THERAPEUTIC EXERCISES: CPT | Performed by: PHYSICAL THERAPIST

## 2022-09-29 NOTE — PROGRESS NOTES
Daily Note     Today's date: 2022  Patient name: Jamia Vargas  : 1950  MRN: [id number]  Referring provider: Sumaya Benavidez  Dx:   Encounter Diagnosis     ICD-10-CM    1  Acute pain of left shoulder  M25 512    2  Subacromial impingement of left shoulder  M75 42        Start Time: 1315  Stop Time: 1400  Total time in clinic (min): 45 minutes    Subjective: Pt reports impoved function, intermittent pain  Objective: See treatment diary below    Assessment: Tolerated treatment well  Patient would benefit from continued PT 1:1 with Rosa Paz DPT for entirety of tx  Pt progressing well, only pain reported with D2 was mild  Continue progressive loading  Plan: Continue per plan of care        Precautions: DMII,     Pertinent Findings:                                                                                                                                                     Test / Measure  2022   FOTO 72 64   Flexion mmt  4-/5 4/5   SH IR ROM L2 T10     Manuals                                    Neuro Re-Ed        SRER 2x10 gtb 2x10 gtb 2x10 GTB 2x10 MTB 2x10 MTB   TB ER        IR Strap S 3x30" 3x30" 3x30'' 3x30'' 3x30''   [place] Row 3x10 25# 3x10 25# 3x10 25# 3x10 BTB 3x10 25#   [place] Row    3x10 20# 3x10 20#   [place] I 3x10 20# 3x10 20# 3x10 20# 3x10 20# 3x10 20#   Isometric ABD + Flexion AROM 3x10 rtb 3x10 rtb 3x10 RTB 3x10 RTB 3x10 MTB   Face Pull 3x10 14# 3x10 14# 3x10 14# 3x10 14# 3x10 14#   D1 ext 3x10 9# 3x10 9# 3x10 9# 3x10 9# 3x10 9#   90/90 Carry    3x30" 5# 3x30" 5#   Ther Ex        UBE 4/4 4/4 4'/4' L1 5 4'/4' L1 5 4'/4' L1 5   SH flexion to 90 3x10 3# 3x10 3# 3x10 3# 3x10 3# 3x10 3#   SH Abd to 90 3x10 3# 3x10 3# 3x10 3# 3x10 3# 3x10 3#   Posterior capsule stretch     2x30"                                   Ther Activity                        Gait Training                        Modalities

## 2022-10-04 ENCOUNTER — OFFICE VISIT (OUTPATIENT)
Dept: PHYSICAL THERAPY | Facility: REHABILITATION | Age: 72
End: 2022-10-04
Payer: MEDICARE

## 2022-10-04 DIAGNOSIS — M75.42 SUBACROMIAL IMPINGEMENT OF LEFT SHOULDER: ICD-10-CM

## 2022-10-04 DIAGNOSIS — M25.512 ACUTE PAIN OF LEFT SHOULDER: Primary | ICD-10-CM

## 2022-10-04 PROCEDURE — 97112 NEUROMUSCULAR REEDUCATION: CPT | Performed by: PHYSICAL THERAPIST

## 2022-10-04 PROCEDURE — 97110 THERAPEUTIC EXERCISES: CPT | Performed by: PHYSICAL THERAPIST

## 2022-10-04 NOTE — PROGRESS NOTES
Daily Note     Today's date: 10/4/2022  Patient name: Eladia Banegas  : 1950  MRN: [id number]  Referring provider: Deepika Harkins  Dx:   Encounter Diagnosis     ICD-10-CM    1  Acute pain of left shoulder  M25 512    2  Subacromial impingement of left shoulder  M75 42        Start Time: 1415  Stop Time: 1455  Total time in clinic (min): 40 minutes    Subjective: Reports feeling good, minimal pain  Objective: See treatment diary below    Assessment: Tolerated treatment well  Patient would benefit from continued PT 1:1 with Syd Craft DPT for entirety of tx  Plan: Continue per plan of care        Precautions: DMII,     Pertinent Findings:                                                                                                                                                     Test / Measure  2022   FOTO 72 64   Flexion mmt  4-/5 4/5   SH IR ROM L2 T10     Manuals 9/16 9/20 9/22 9/27 9/29 10/4                                       Neuro Re-Ed         SRER 2x10 gtb 2x10 gtb 2x10 GTB 2x10 MTB 2x10 MTB    TB ER         IR Strap S 3x30" 3x30" 3x30'' 3x30'' 3x30'' 3x30''   [place] Row 3x10 25# 3x10 25# 3x10 25# 3x10 BTB 3x10 25# 3x10 30#   [place] Row    3x10 20# 3x10 20#    [place] I 3x10 20# 3x10 20# 3x10 20# 3x10 20# 3x10 20# 3x10 22   Isometric ABD + Flexion AROM 3x10 rtb 3x10 rtb 3x10 RTB 3x10 RTB 3x10 MTB    Face Pull 3x10 14# 3x10 14# 3x10 14# 3x10 14# 3x10 14# 3x10 17 5#   D1 ext 3x10 9# 3x10 9# 3x10 9# 3x10 9# 3x10 9# 3x10 9#   90/90 Carry    3x30" 5# 3x30" 5# 3x30" 5#   Ther Ex         UBE 4/4 4/4 4'/4' L1 5 4'/4' L1 5 4'/4' L1 5 4'/4' L1 5   SH flexion to 90 3x10 3# 3x10 3# 3x10 3# 3x10 3# 3x10 3# 3x10 3#   SH Abd to 90 3x10 3# 3x10 3# 3x10 3# 3x10 3# 3x10 3# 3x10 3#   Posterior capsule stretch     2x30" 2x30"                                       Ther Activity                           Gait Training                           Modalities

## 2022-10-06 ENCOUNTER — OFFICE VISIT (OUTPATIENT)
Dept: PHYSICAL THERAPY | Facility: REHABILITATION | Age: 72
End: 2022-10-06
Payer: MEDICARE

## 2022-10-06 DIAGNOSIS — M75.42 SUBACROMIAL IMPINGEMENT OF LEFT SHOULDER: ICD-10-CM

## 2022-10-06 DIAGNOSIS — M25.512 ACUTE PAIN OF LEFT SHOULDER: Primary | ICD-10-CM

## 2022-10-06 PROCEDURE — 97110 THERAPEUTIC EXERCISES: CPT

## 2022-10-06 PROCEDURE — 97112 NEUROMUSCULAR REEDUCATION: CPT

## 2022-10-06 NOTE — PROGRESS NOTES
Daily Note     Today's date: 10/6/2022  Patient name: Jamia Vargas  : 1950  MRN: [id number]  Referring provider: Sumaya Benavidez  Dx:   Encounter Diagnosis     ICD-10-CM    1  Acute pain of left shoulder  M25 512    2  Subacromial impingement of left shoulder  M75 42                   Subjective: Pt reports overall improvement, states that IR behind back is no longer painful      Objective: See treatment diary below      Assessment: Tolerated treatment well  Patient would benefit from continued PT   Pt  able to complete all exercises with no increase in pain during or after session  Pt demonstrated good mobility and control throughout session  Pt  1:1 with PTA for entirety  Plan: Continue per plan of care        Precautions: DMII,     Pertinent Findings:                                                                                                                                                     Test / Measure  2022   FOTO 72 64   Flexion mmt  4-/5 4/5   SH IR ROM L2 T10     Manuals 9/16 9/20 9/22 9/27 9/29 10/4 10/6                                           Neuro Re-Ed          SRER 2x10 gtb 2x10 gtb 2x10 GTB 2x10 MTB 2x10 MTB     TB ER          IR Strap S 3x30" 3x30" 3x30'' 3x30'' 3x30'' 3x30'' 3x30"   [place] Row 3x10 25# 3x10 25# 3x10 25# 3x10 BTB 3x10 25# 3x10 30# 3x10 30#   [place] Row    3x10 20# 3x10 20#     [place] I 3x10 20# 3x10 20# 3x10 20# 3x10 20# 3x10 20# 3x10 22 3x10 22#   Isometric ABD + Flexion AROM 3x10 rtb 3x10 rtb 3x10 RTB 3x10 RTB 3x10 MTB     Face Pull 3x10 14# 3x10 14# 3x10 14# 3x10 14# 3x10 14# 3x10 17 5# 3x10 17 5#   D1 ext 3x10 9# 3x10 9# 3x10 9# 3x10 9# 3x10 9# 3x10 9# 3x10  9#   90/90 Carry    3x30" 5# 3x30" 5# 3x30" 5# 3x30" 5"   Ther Ex          UBE 4/4 4/4 4'/4' L1 5 4'/4' L1 5 4'/4' L1 5 4'/4' L1 5 4/4 L2   SH flexion to 90 3x10 3# 3x10 3# 3x10 3# 3x10 3# 3x10 3# 3x10 3# 3x10 3#   SH Abd to 90 3x10 3# 3x10 3# 3x10 3# 3x10 3# 3x10 3# 3x10 3# 3x10 3#   Posterior capsule stretch     2x30" 2x30"                                            Ther Activity                              Gait Training                              Modalities

## 2022-10-11 ENCOUNTER — OFFICE VISIT (OUTPATIENT)
Dept: PHYSICAL THERAPY | Facility: REHABILITATION | Age: 72
End: 2022-10-11
Payer: MEDICARE

## 2022-10-11 DIAGNOSIS — M75.42 SUBACROMIAL IMPINGEMENT OF LEFT SHOULDER: ICD-10-CM

## 2022-10-11 DIAGNOSIS — M25.512 ACUTE PAIN OF LEFT SHOULDER: Primary | ICD-10-CM

## 2022-10-11 PROCEDURE — 97110 THERAPEUTIC EXERCISES: CPT | Performed by: PHYSICAL THERAPIST

## 2022-10-11 PROCEDURE — 97112 NEUROMUSCULAR REEDUCATION: CPT | Performed by: PHYSICAL THERAPIST

## 2022-10-11 NOTE — PROGRESS NOTES
PT Re-Evaluation     Today's date: 10/11/2022  Patient name: Jasmina Mooney  : 1950  MRN: [id number]  Referring provider: Cynthia Skaggs  Dx:   Encounter Diagnosis     ICD-10-CM    1  Acute pain of left shoulder  M25 512    2  Subacromial impingement of left shoulder  M75 42        Start Time: 1300  Stop Time: 1345  Total time in clinic (min): 45 minutes    Assessment/Plan  Pt's s/s have reduced substantially, pain intermittent and load dependent  Pt traveling for the next month, will reval upon return  Hold tx until pt returns from vacation  Goals  Short Term Goals: In 3 weeks, the patient will:  1  Full pain free SH ROM MET  2  SH mmt to 3-/5 MET  3  Supervision with HEP for self care MET    Long Term Goals: In 6 weeks, the patient will:  1  SH MMT to 5/5 Not Met  2  FOTO to greater than predicted value   3  Independent with HEP for selfcare MET      Subjective  Pt reports progressing very well, pain and function substantially improved       Objective         Pertinent Findings:                                                                                                                                                     Test / Measure  2022 9/22 10/11   FOTO 72 64 78   Flexion mmt  4-/5 4/5 5/5   SH IR ROM L2 T10 T8     Manuals 9/16 9/20 9/22 9/27 9/29 10/4 10/6 10/11                                               Neuro Re-Ed           SRER 2x10 gtb 2x10 gtb 2x10 GTB 2x10 MTB 2x10 MTB      TB ER           IR Strap S 3x30" 3x30" 3x30'' 3x30'' 3x30'' 3x30'' 3x30" 3x30"   [place] Row 3x10 25# 3x10 25# 3x10 25# 3x10 BTB 3x10 25# 3x10 30# 3x10 30# 3x10 30#   [place] Row    3x10 20# 3x10 20#      [place] I 3x10 20# 3x10 20# 3x10 20# 3x10 20# 3x10 20# 3x10 22 3x10 22# 3x10 22#   Isometric ABD + Flexion AROM 3x10 rtb 3x10 rtb 3x10 RTB 3x10 RTB 3x10 MTB      Face Pull 3x10 14# 3x10 14# 3x10 14# 3x10 14# 3x10 14# 3x10 17 5# 3x10 17 5# 3x10 17 5#   D1 ext 3x10 9# 3x10 9# 3x10 9# 3x10 9# 3x10 9# 3x10 9# 3x10  9# 3x10  9#   90/90 Carry    3x30" 5# 3x30" 5# 3x30" 5# 3x30" 5" 3x30" 5"   Ther Ex           UBE 4/4 4/4 4'/4' L1 5 4'/4' L1 5 4'/4' L1 5 4'/4' L1 5 4/4 L2 4/4 L2   SH flexion to 90 3x10 3# 3x10 3# 3x10 3# 3x10 3# 3x10 3# 3x10 3# 3x10 3# 3x10 4#   SH Abd to 90 3x10 3# 3x10 3# 3x10 3# 3x10 3# 3x10 3# 3x10 3# 3x10 3# 3x10 4#   Posterior capsule stretch     2x30" 2x30"  2x30"                                               Ther Activity                                 Gait Training                                 Modalities

## 2022-10-17 ENCOUNTER — OFFICE VISIT (OUTPATIENT)
Dept: OBGYN CLINIC | Facility: OTHER | Age: 72
End: 2022-10-17
Payer: MEDICARE

## 2022-10-17 VITALS
HEIGHT: 69 IN | BODY MASS INDEX: 27.4 KG/M2 | DIASTOLIC BLOOD PRESSURE: 78 MMHG | SYSTOLIC BLOOD PRESSURE: 131 MMHG | WEIGHT: 185 LBS | HEART RATE: 73 BPM

## 2022-10-17 DIAGNOSIS — M75.42 IMPINGEMENT SYNDROME OF LEFT SHOULDER: Primary | ICD-10-CM

## 2022-10-17 PROCEDURE — 99213 OFFICE O/P EST LOW 20 MIN: CPT | Performed by: PHYSICIAN ASSISTANT

## 2022-10-17 NOTE — PROGRESS NOTES
Assessment  Diagnoses and all orders for this visit:    Impingement syndrome of left shoulder      Discussion and Plan:    Diana Badillo has nearly resolved his impingement  He still has some objective findings, but his strength is great  He has resumed all activities without pain  If pain returns he will let me know  He will continue with his HEP as directed by therapist   If he has any issues, questions or concerns, he will give us a call  Follow up : PRN    Subjective:   Patient ID: Huyen Izquierdo is a 73 y o  male      Diana Badillo presents to the office in follow up of his left shoulder impingement syndrome  At last visit, he was referred to formal physical therapy  He was offered a steroid injection, but he declined  Per therapy reports, he is improving  Diana Badillo notes pain with certain motions only  Pain improves with rest   Denies any pain that interferes with ADLs and activities of enjoyment  Diana Badillo denies pain that interferes with sleep  Denies new injury or trauma  Denies paresthesias  Pleased with progress      The following portions of the patient's history were reviewed and updated as appropriate: allergies, current medications, past family history, past medical history, past social history, past surgical history and problem list     Review of Systems   Constitutional: Negative for chills and fever  HENT: Negative for hearing loss  Eyes: Negative for visual disturbance  Respiratory: Negative for shortness of breath  Cardiovascular: Negative for chest pain  Gastrointestinal: Negative for abdominal pain  Musculoskeletal:        As reviewed in the HPI   Skin: Negative for rash  Neurological:        As reviewed in the HPI   Psychiatric/Behavioral: Negative for agitation         Objective:  /78 (BP Location: Right arm, Patient Position: Sitting, Cuff Size: Adult)   Pulse 73   Ht 5' 9" (1 753 m)   Wt 83 9 kg (185 lb)   BMI 27 32 kg/m²       Left Shoulder Exam     Tenderness   The patient is experiencing no tenderness  Range of Motion   The patient has normal left shoulder ROM  Muscle Strength   External rotation: 5/5   Supraspinatus: 5/5     Tests   Chinchilla test: positive  Impingement: negative  Drop arm: negative    Other   Erythema: absent  Sensation: normal  Pulse: present             Physical Exam  Constitutional:       Appearance: He is well-developed  HENT:      Head: Normocephalic  Pulmonary:      Effort: No respiratory distress  Breath sounds: No wheezing  Musculoskeletal:      Cervical back: Normal range of motion  Skin:     General: Skin is warm and dry  Neurological:      Mental Status: He is alert and oriented to person, place, and time  Psychiatric:         Behavior: Behavior normal          Thought Content:  Thought content normal          Judgment: Judgment normal

## 2022-10-29 NOTE — ASSESSMENT & PLAN NOTE
Patient had a history of vitamin-D deficiency  He was given a bolus dose of 50,000 international units 3 times a week for 4 weeks  He was supposed to start taking supplements specifically of vitamin-D 2000 international units daily  He states he has not been compliant    We will check a vitamin-D level when he returns to the office 29-Oct-2022 14:09

## 2022-11-28 ENCOUNTER — TELEPHONE (OUTPATIENT)
Dept: INTERNAL MEDICINE CLINIC | Facility: CLINIC | Age: 72
End: 2022-11-28

## 2022-11-28 NOTE — TELEPHONE ENCOUNTER
Liliane Hdez called to ask if he is due for a pneumonia shot  He was going to book it at [facility] but it asked which vaccine he wanted  Please advise  He is ok coming here for it     [phone]

## 2023-01-05 DIAGNOSIS — I10 ESSENTIAL HYPERTENSION: ICD-10-CM

## 2023-01-05 DIAGNOSIS — E11.65 TYPE 2 DIABETES MELLITUS WITH HYPERGLYCEMIA, WITHOUT LONG-TERM CURRENT USE OF INSULIN (HCC): ICD-10-CM

## 2023-01-05 DIAGNOSIS — E78.01 FAMILIAL HYPERCHOLESTEROLEMIA: ICD-10-CM

## 2023-01-05 RX ORDER — LOSARTAN POTASSIUM 50 MG/1
50 TABLET ORAL DAILY
Qty: 90 TABLET | Refills: 0 | Status: SHIPPED | OUTPATIENT
Start: 2023-01-05

## 2023-01-05 RX ORDER — ATORVASTATIN CALCIUM 10 MG/1
10 TABLET, FILM COATED ORAL DAILY
Qty: 90 TABLET | Refills: 0 | Status: SHIPPED | OUTPATIENT
Start: 2023-01-05

## 2023-01-05 RX ORDER — METFORMIN HYDROCHLORIDE 500 MG/1
500 TABLET, EXTENDED RELEASE ORAL 2 TIMES DAILY WITH MEALS
Qty: 180 TABLET | Refills: 0 | Status: SHIPPED | OUTPATIENT
Start: 2023-01-05

## 2023-04-03 DIAGNOSIS — E78.01 FAMILIAL HYPERCHOLESTEROLEMIA: ICD-10-CM

## 2023-04-03 DIAGNOSIS — E11.65 TYPE 2 DIABETES MELLITUS WITH HYPERGLYCEMIA, WITHOUT LONG-TERM CURRENT USE OF INSULIN (HCC): ICD-10-CM

## 2023-04-03 DIAGNOSIS — I10 ESSENTIAL HYPERTENSION: ICD-10-CM

## 2023-04-03 RX ORDER — LOSARTAN POTASSIUM 50 MG/1
TABLET ORAL
Qty: 90 TABLET | Refills: 0 | Status: SHIPPED | OUTPATIENT
Start: 2023-04-03

## 2023-04-03 RX ORDER — METFORMIN HYDROCHLORIDE 500 MG/1
TABLET, EXTENDED RELEASE ORAL
Qty: 180 TABLET | Refills: 0 | Status: SHIPPED | OUTPATIENT
Start: 2023-04-03

## 2023-04-03 RX ORDER — ATORVASTATIN CALCIUM 10 MG/1
TABLET, FILM COATED ORAL
Qty: 90 TABLET | Refills: 0 | Status: SHIPPED | OUTPATIENT
Start: 2023-04-03

## 2023-04-26 ENCOUNTER — OFFICE VISIT (OUTPATIENT)
Dept: INTERNAL MEDICINE CLINIC | Facility: CLINIC | Age: 73
End: 2023-04-26

## 2023-04-26 VITALS
SYSTOLIC BLOOD PRESSURE: 102 MMHG | HEIGHT: 69 IN | HEART RATE: 90 BPM | TEMPERATURE: 98 F | DIASTOLIC BLOOD PRESSURE: 60 MMHG | OXYGEN SATURATION: 97 % | BODY MASS INDEX: 27.25 KG/M2 | RESPIRATION RATE: 18 BRPM | WEIGHT: 184 LBS

## 2023-04-26 DIAGNOSIS — E78.01 FAMILIAL HYPERCHOLESTEROLEMIA: ICD-10-CM

## 2023-04-26 DIAGNOSIS — E55.9 VITAMIN D DEFICIENCY: ICD-10-CM

## 2023-04-26 DIAGNOSIS — E11.65 TYPE 2 DIABETES MELLITUS WITH HYPERGLYCEMIA, WITHOUT LONG-TERM CURRENT USE OF INSULIN (HCC): ICD-10-CM

## 2023-04-26 DIAGNOSIS — L30.9 DERMATITIS: ICD-10-CM

## 2023-04-26 DIAGNOSIS — I10 PRIMARY HYPERTENSION: ICD-10-CM

## 2023-04-26 DIAGNOSIS — Z12.31 ENCOUNTER FOR SCREENING MAMMOGRAM FOR MALIGNANT NEOPLASM OF BREAST: Primary | ICD-10-CM

## 2023-04-26 LAB
CREAT UR-MCNC: 275 MG/DL
MICROALBUMIN UR-MCNC: 49.7 MG/L (ref 0–20)
MICROALBUMIN/CREAT 24H UR: 18 MG/G CREATININE (ref 0–30)
SL AMB POCT HEMOGLOBIN AIC: 5.7 (ref ?–6.5)

## 2023-04-26 RX ORDER — CLOTRIMAZOLE AND BETAMETHASONE DIPROPIONATE 10; .64 MG/G; MG/G
CREAM TOPICAL 2 TIMES DAILY
Qty: 30 G | Refills: 4 | Status: SHIPPED | OUTPATIENT
Start: 2023-04-26

## 2023-04-26 NOTE — ASSESSMENT & PLAN NOTE
Patient has some spots of dermatitis behind both ears forehead and the angles of his mouth bilaterally  He uses hydrocortisone cream occasionally which she states helps  New prescription was given

## 2023-04-26 NOTE — ASSESSMENT & PLAN NOTE
Patient does have a history of hypertension  He remains on Cozaar 50 mg daily and as noted blood pressure is showing excellent control  He denies any lightheadedness or dizziness  We will be checking on his renal function with his next visit and making adjustments to medication if needed in the future

## 2023-04-26 NOTE — ASSESSMENT & PLAN NOTE
Patient does have a longstanding history of diabetes mellitus type 2  As noted patient's blood sugars are showing excellent control with hemoglobin A1c of 5 7 in the office today  Patient states that he has been watching his diet more closely and with him being in [place] over the winter who is eating a lot of fresh fruits and vegetables  Not a lot of fatty foods  He did have a urine collected today which will be sent off for test for microalbumin  He is up-to-date with diabetic foot exam and eye exam   We will continue to monitor blood sugar readings and make adjustment to treatment if needed in the future    Lab Results   Component Value Date    HGBA1C 5 7 04/26/2023

## 2023-04-26 NOTE — ASSESSMENT & PLAN NOTE
History of hyperlipidemia  As previously patient has been told it is extremely important that we continue to monitor his cholesterol and he remains on medication atorvastatin 10 mg daily  Again we discussed diet and the importance of watching his intake of fats and cholesterol

## 2023-04-26 NOTE — ASSESSMENT & PLAN NOTE
Patient does have a history of a vitamin D deficiency and is taking supplements  Check on a level with his next visit

## 2023-04-26 NOTE — PROGRESS NOTES
Name: Christel Poole      : 1950      MRN: [id number]  Encounter Provider: Calvin Bowie DO  Encounter Date: 2023   Encounter department: [facility] INTERNAL MEDICINE    Assessment & Plan     1  Encounter for screening mammogram for malignant neoplasm of breast    2  Type 2 diabetes mellitus with hyperglycemia, without long-term current use of insulin ([facility] [address] )  Assessment & Plan:  Patient does have a longstanding history of diabetes mellitus type 2  As noted patient's blood sugars are showing excellent control with hemoglobin A1c of 5 7 in the office today  Patient states that he has been watching his diet more closely and with him being in [place] over the winter who is eating a lot of fresh fruits and vegetables  Not a lot of fatty foods  He did have a urine collected today which will be sent off for test for microalbumin  He is up-to-date with diabetic foot exam and eye exam   We will continue to monitor blood sugar readings and make adjustment to treatment if needed in the future  Lab Results   Component Value Date    HGBA1C 5 7 2023       Orders:  -     POCT hemoglobin A1c  -     Microalbumin / creatinine urine ratio  -     Comprehensive metabolic panel; Future  -     CBC and differential; Future  -     Lipid panel; Future  -     Vitamin D 25 hydroxy; Future    3  Dermatitis  Assessment & Plan:  Patient has some spots of dermatitis behind both ears forehead and the angles of his mouth bilaterally  He uses hydrocortisone cream occasionally which she states helps  New prescription was given  Orders:  -     clotrimazole-betamethasone (LOTRISONE) 1-0 05 % cream; Apply topically 2 (two) times a day    4  Vitamin D deficiency  Assessment & Plan:  Patient does have a history of a vitamin D deficiency and is taking supplements  Check on a level with his next visit  Orders:  -     Vitamin D 25 hydroxy; Future    5   Primary hypertension  Assessment & Plan:  Patient does have a history of hypertension  He remains on Cozaar 50 mg daily and as noted blood pressure is showing excellent control  He denies any lightheadedness or dizziness  We will be checking on his renal function with his next visit and making adjustments to medication if needed in the future  Orders:  -     Comprehensive metabolic panel; Future  -     CBC and differential; Future  -     Lipid panel; Future  -     Vitamin D 25 hydroxy; Future    6  Familial hypercholesterolemia  Assessment & Plan:  History of hyperlipidemia  As previously patient has been told it is extremely important that we continue to monitor his cholesterol and he remains on medication atorvastatin 10 mg daily  Again we discussed diet and the importance of watching his intake of fats and cholesterol  Orders:  -     Lipid panel; Future           Subjective     Patient is a 72-year-old male history of multiple medical problems who is here today for routine follow-up after 6-month period of time  Patient has spent the winter in [place] and now has returned locally  States he is feeling well offers no new medical complaints or concerns  He is accompanied today by his wife  Now officially retired    Review of Systems   Constitutional: Negative  HENT: Negative  Eyes: Negative  Respiratory: Negative  Cardiovascular: Negative  Gastrointestinal: Negative  Endocrine: Negative  Genitourinary: Negative  Musculoskeletal: Negative  Skin: Negative  Allergic/Immunologic: Negative  Neurological: Negative  Hematological: Negative  Psychiatric/Behavioral: Negative          Past Medical History:   Diagnosis Date   • Allergic Bees   • Diabetes ([facility] [address] )    • Diabetes mellitus ([facility] [address] )    • HL (hearing loss) 11/11/2020   • Hyperlipidemia    • Hypertension      Past Surgical History:   Procedure Laterality Date   • APPENDECTOMY       Family History   Problem Relation Age of Onset   • Parkinsonism Mother    • Arthritis Mother    • Hearing loss Mother         at age 80   • Diabetes Father    • Pancreatic cancer Father    • Colon cancer Father    • Asthma Father    • Cancer Father         colon cancer   • Hypertension Family    • Hearing loss Family      Social History     Socioeconomic History   • Marital status: /Civil Union     Spouse name: None   • Number of children: None   • Years of education: None   • Highest education level: None   Occupational History   • None   Tobacco Use   • Smoking status: Former     Packs/day: 1 00     Years: 25 00     Pack years: 25 00     Types: Cigars, Cigarettes     Quit date: 1999     Years since quittin 7   • Smokeless tobacco: Never   Vaping Use   • Vaping Use: Never used   Substance and Sexual Activity   • Alcohol use: Yes     Alcohol/week: 3 0 standard drinks     Types: 3 Glasses of wine per week     Comment: wine   • Drug use: Never   • Sexual activity: Yes     Partners: Female     Birth control/protection: None   Other Topics Concern   • None   Social History Narrative   • None     Social Determinants of Health     Financial Resource Strain: Low Risk    • Difficulty of Paying Living Expenses: Not very hard   Food Insecurity: Not on file   Transportation Needs: No Transportation Needs   • Lack of Transportation (Medical): No   • Lack of Transportation (Non-Medical):  No   Physical Activity: Not on file   Stress: Not on file   Social Connections: Not on file   Intimate Partner Violence: Not on file   Housing Stability: Not on file     Current Outpatient Medications on File Prior to Visit   Medication Sig   • albuterol (PROVENTIL HFA,VENTOLIN HFA) 90 mcg/act inhaler Inhale 2 puffs every 6 (six) hours as needed for wheezing or shortness of breath   • atorvastatin (LIPITOR) 10 mg tablet TAKE 1 TABLET BY MOUTH EVERY DAY   • EPINEPHrine (EPIPEN) 0 3 mg/0 3 mL SOAJ Inject 0 3 mL (0 3 mg total) into a muscle once for 1 dose   • ergocalciferol (VITAMIN D2) 50,000 units Take by mouth   • "fluticasone (FLONASE) 50 mcg/act nasal spray 1 spray into each nostril daily   • glucose blood (OneTouch Ultra) test strip Test blood sugars daily   • losartan (COZAAR) 50 mg tablet TAKE 1 TABLET BY MOUTH EVERY DAY   • metFORMIN (GLUCOPHAGE-XR) 500 mg 24 hr tablet TAKE 1 TABLET BY MOUTH TWICE A DAY WITH MEALS   • [DISCONTINUED] clotrimazole-betamethasone (LOTRISONE) 1-0 05 % cream Apply topically 2 (two) times a day     Allergies   Allergen Reactions   • Bee Venom Syncope     Immunization History   Administered Date(s) Administered   • COVID-19 MODERNA VACC 0 5 ML IM 02/19/2021, 03/19/2021, 10/30/2021, 04/19/2022   • INFLUENZA 10/01/2015, 09/26/2016, 09/29/2017, 09/26/2018, 11/01/2018   • Influenza, high dose seasonal 0 7 mL 09/23/2020, 12/09/2021   • Pneumococcal Conjugate 13-Valent 12/09/2021       Objective     /60 (BP Location: Left arm, Patient Position: Sitting, Cuff Size: Adult)   Pulse 90   Temp 98 °F (36 7 °C) (Tympanic)   Resp 18   Ht 5' 9\" (1 753 m)   Wt 83 5 kg (184 lb)   SpO2 97%   BMI 27 17 kg/m²     Physical Exam  Vitals and nursing note reviewed  Constitutional:       General: He is not in acute distress  Appearance: Normal appearance  He is not ill-appearing, toxic-appearing or diaphoretic  Comments: Pleasant articulate 32-year-old male who is awake alert in no acute distress and oriented x3   HENT:      Head: Normocephalic and atraumatic  Right Ear: Tympanic membrane, ear canal and external ear normal  There is no impacted cerumen  Left Ear: Tympanic membrane, ear canal and external ear normal  There is no impacted cerumen  Nose: Nose normal  No congestion or rhinorrhea  Mouth/Throat:      Mouth: Mucous membranes are moist       Pharynx: Oropharynx is clear  No oropharyngeal exudate or posterior oropharyngeal erythema  Eyes:      General: No scleral icterus  Right eye: No discharge  Left eye: No discharge        Extraocular Movements: " Extraocular movements intact  Conjunctiva/sclera: Conjunctivae normal       Pupils: Pupils are equal, round, and reactive to light  Neck:      Vascular: No carotid bruit  Cardiovascular:      Rate and Rhythm: Normal rate and regular rhythm  Pulses: Normal pulses  Heart sounds: Normal heart sounds  No murmur heard  No friction rub  No gallop  Pulmonary:      Effort: Pulmonary effort is normal  No respiratory distress  Breath sounds: Normal breath sounds  No stridor  No wheezing, rhonchi or rales  Chest:      Chest wall: No tenderness  Abdominal:      General: Abdomen is flat  Bowel sounds are normal  There is no distension  Palpations: Abdomen is soft  There is no mass  Tenderness: There is no abdominal tenderness  There is no right CVA tenderness, left CVA tenderness, guarding or rebound  Hernia: No hernia is present  Musculoskeletal:         General: No swelling, tenderness, deformity or signs of injury  Normal range of motion  Cervical back: Normal range of motion and neck supple  No rigidity or tenderness  Right lower leg: No edema  Left lower leg: No edema  Lymphadenopathy:      Cervical: No cervical adenopathy  Skin:     General: Skin is warm and dry  Capillary Refill: Capillary refill takes less than 2 seconds  Coloration: Skin is not jaundiced or pale  Findings: No bruising, erythema, lesion or rash  Neurological:      General: No focal deficit present  Mental Status: He is alert and oriented to person, place, and time  Mental status is at baseline  Cranial Nerves: No cranial nerve deficit  Sensory: No sensory deficit  Motor: No weakness  Coordination: Coordination normal       Gait: Gait normal       Deep Tendon Reflexes: Reflexes normal    Psychiatric:         Mood and Affect: Mood normal          Behavior: Behavior normal          Thought Content:  Thought content normal          Judgment: Judgment rohan Ortiz DO

## 2023-05-16 ENCOUNTER — CLINICAL SUPPORT (OUTPATIENT)
Dept: INTERNAL MEDICINE CLINIC | Facility: CLINIC | Age: 73
End: 2023-05-16

## 2023-05-16 DIAGNOSIS — Z23 ENCOUNTER FOR IMMUNIZATION: Primary | ICD-10-CM

## 2023-06-15 LAB
LEFT EYE DIABETIC RETINOPATHY: NORMAL
RIGHT EYE DIABETIC RETINOPATHY: NORMAL

## 2023-06-23 DIAGNOSIS — E78.01 FAMILIAL HYPERCHOLESTEROLEMIA: ICD-10-CM

## 2023-06-23 DIAGNOSIS — E11.65 TYPE 2 DIABETES MELLITUS WITH HYPERGLYCEMIA, WITHOUT LONG-TERM CURRENT USE OF INSULIN (HCC): ICD-10-CM

## 2023-06-23 DIAGNOSIS — I10 ESSENTIAL HYPERTENSION: ICD-10-CM

## 2023-06-23 RX ORDER — ATORVASTATIN CALCIUM 10 MG/1
TABLET, FILM COATED ORAL
Qty: 90 TABLET | Refills: 0 | Status: SHIPPED | OUTPATIENT
Start: 2023-06-23

## 2023-06-23 RX ORDER — LOSARTAN POTASSIUM 50 MG/1
TABLET ORAL
Qty: 90 TABLET | Refills: 0 | Status: SHIPPED | OUTPATIENT
Start: 2023-06-23

## 2023-08-10 DIAGNOSIS — E78.01 FAMILIAL HYPERCHOLESTEROLEMIA: ICD-10-CM

## 2023-08-10 DIAGNOSIS — E11.65 TYPE 2 DIABETES MELLITUS WITH HYPERGLYCEMIA, WITHOUT LONG-TERM CURRENT USE OF INSULIN (HCC): ICD-10-CM

## 2023-08-10 DIAGNOSIS — I10 ESSENTIAL HYPERTENSION: ICD-10-CM

## 2023-08-10 RX ORDER — ATORVASTATIN CALCIUM 10 MG/1
10 TABLET, FILM COATED ORAL DAILY
Qty: 90 TABLET | Refills: 0 | Status: SHIPPED | OUTPATIENT
Start: 2023-08-10

## 2023-08-10 RX ORDER — LOSARTAN POTASSIUM 50 MG/1
50 TABLET ORAL DAILY
Qty: 90 TABLET | Refills: 0 | Status: SHIPPED | OUTPATIENT
Start: 2023-08-10

## 2023-08-12 ENCOUNTER — NURSE TRIAGE (OUTPATIENT)
Dept: OTHER | Facility: OTHER | Age: 73
End: 2023-08-12

## 2023-08-12 NOTE — TELEPHONE ENCOUNTER
I spoke with the pt briefly and he made me aware he already was able to have them refilled and did not need my assistance.

## 2023-08-12 NOTE — TELEPHONE ENCOUNTER
Regarding: urgent med refill (losartan / Lipitor)  ----- Message from Joleen Childers sent at 8/12/2023  3:49 PM EDT -----  Medication Refill Request     Name losartan (COZAAR) 50 mg tablet [45061]   atorvastatin (LIPITOR) 10 mg tablet [82629]   Dose/Frequency Take 1 tablet (50 mg total) by mouth daily / Take 1 tablet (10 mg total) by mouth daily   Quantity 90 tablet   Verified pharmacy   [facility]  [phone]   Verified ordering Provider   x   Does patient have enough for the next 3 days?  No

## 2023-08-12 NOTE — TELEPHONE ENCOUNTER
Answer Assessment - Initial Assessment Questions  1. REASON FOR CALL or QUESTION: "What is your reason for calling today?" or "How can I best help you?" or "What question do you have that I can help answer?"      Pt needed medications Losartan and Atorvastatin refilled and was called back. Pt states he has already had them refilled and did not need any  Assistance today.     Protocols used: INFORMATION ONLY CALL - NO TRIAGE-ADULT-

## 2023-08-23 ENCOUNTER — RA CDI HCC (OUTPATIENT)
Dept: OTHER | Facility: HOSPITAL | Age: 73
End: 2023-08-23

## 2023-08-25 ENCOUNTER — APPOINTMENT (OUTPATIENT)
Dept: LAB | Facility: CLINIC | Age: 73
End: 2023-08-25
Payer: MEDICARE

## 2023-08-25 DIAGNOSIS — E11.65 TYPE 2 DIABETES MELLITUS WITH HYPERGLYCEMIA, WITHOUT LONG-TERM CURRENT USE OF INSULIN (HCC): ICD-10-CM

## 2023-08-25 DIAGNOSIS — E78.01 FAMILIAL HYPERCHOLESTEROLEMIA: ICD-10-CM

## 2023-08-25 DIAGNOSIS — E55.9 VITAMIN D DEFICIENCY: ICD-10-CM

## 2023-08-25 DIAGNOSIS — I10 PRIMARY HYPERTENSION: ICD-10-CM

## 2023-08-25 LAB
25(OH)D3 SERPL-MCNC: 62.7 NG/ML (ref 30–100)
ALBUMIN SERPL BCP-MCNC: 4.3 G/DL (ref 3.5–5)
ALP SERPL-CCNC: 55 U/L (ref 34–104)
ALT SERPL W P-5'-P-CCNC: 20 U/L (ref 7–52)
ANION GAP SERPL CALCULATED.3IONS-SCNC: 8 MMOL/L
AST SERPL W P-5'-P-CCNC: 20 U/L (ref 13–39)
BASOPHILS # BLD AUTO: 0.07 THOUSANDS/ÂΜL (ref 0–0.1)
BASOPHILS NFR BLD AUTO: 1 % (ref 0–1)
BILIRUB SERPL-MCNC: 0.53 MG/DL (ref 0.2–1)
BUN SERPL-MCNC: 15 MG/DL (ref 5–25)
CALCIUM SERPL-MCNC: 9.8 MG/DL (ref 8.4–10.2)
CHLORIDE SERPL-SCNC: 108 MMOL/L (ref 96–108)
CHOLEST SERPL-MCNC: 139 MG/DL
CO2 SERPL-SCNC: 27 MMOL/L (ref 21–32)
CREAT SERPL-MCNC: 1.01 MG/DL (ref 0.6–1.3)
EOSINOPHIL # BLD AUTO: 0.53 THOUSAND/ÂΜL (ref 0–0.61)
EOSINOPHIL NFR BLD AUTO: 8 % (ref 0–6)
ERYTHROCYTE [DISTWIDTH] IN BLOOD BY AUTOMATED COUNT: 13.8 % (ref 11.6–15.1)
GFR SERPL CREATININE-BSD FRML MDRD: 75 ML/MIN/1.73SQ M
GLUCOSE P FAST SERPL-MCNC: 90 MG/DL (ref 65–99)
HCT VFR BLD AUTO: 44.6 % (ref 36.5–49.3)
HDLC SERPL-MCNC: 61 MG/DL
HGB BLD-MCNC: 14.4 G/DL (ref 12–17)
IMM GRANULOCYTES # BLD AUTO: 0.01 THOUSAND/UL (ref 0–0.2)
IMM GRANULOCYTES NFR BLD AUTO: 0 % (ref 0–2)
LDLC SERPL CALC-MCNC: 62 MG/DL (ref 0–100)
LYMPHOCYTES # BLD AUTO: 1.91 THOUSANDS/ÂΜL (ref 0.6–4.47)
LYMPHOCYTES NFR BLD AUTO: 29 % (ref 14–44)
MCH RBC QN AUTO: 30.4 PG (ref 26.8–34.3)
MCHC RBC AUTO-ENTMCNC: 32.3 G/DL (ref 31.4–37.4)
MCV RBC AUTO: 94 FL (ref 82–98)
MONOCYTES # BLD AUTO: 0.55 THOUSAND/ÂΜL (ref 0.17–1.22)
MONOCYTES NFR BLD AUTO: 8 % (ref 4–12)
NEUTROPHILS # BLD AUTO: 3.56 THOUSANDS/ÂΜL (ref 1.85–7.62)
NEUTS SEG NFR BLD AUTO: 54 % (ref 43–75)
NONHDLC SERPL-MCNC: 78 MG/DL
NRBC BLD AUTO-RTO: 0 /100 WBCS
PLATELET # BLD AUTO: 240 THOUSANDS/UL (ref 149–390)
PMV BLD AUTO: 10 FL (ref 8.9–12.7)
POTASSIUM SERPL-SCNC: 4.6 MMOL/L (ref 3.5–5.3)
PROT SERPL-MCNC: 6.6 G/DL (ref 6.4–8.4)
RBC # BLD AUTO: 4.74 MILLION/UL (ref 3.88–5.62)
SODIUM SERPL-SCNC: 143 MMOL/L (ref 135–147)
TRIGL SERPL-MCNC: 80 MG/DL
WBC # BLD AUTO: 6.63 THOUSAND/UL (ref 4.31–10.16)

## 2023-08-25 PROCEDURE — 36415 COLL VENOUS BLD VENIPUNCTURE: CPT

## 2023-08-25 PROCEDURE — 82306 VITAMIN D 25 HYDROXY: CPT

## 2023-08-25 PROCEDURE — 85025 COMPLETE CBC W/AUTO DIFF WBC: CPT

## 2023-08-25 PROCEDURE — 80053 COMPREHEN METABOLIC PANEL: CPT

## 2023-08-25 PROCEDURE — 80061 LIPID PANEL: CPT

## 2023-08-30 ENCOUNTER — OFFICE VISIT (OUTPATIENT)
Dept: INTERNAL MEDICINE CLINIC | Facility: CLINIC | Age: 73
End: 2023-08-30
Payer: MEDICARE

## 2023-08-30 VITALS
HEART RATE: 62 BPM | SYSTOLIC BLOOD PRESSURE: 124 MMHG | TEMPERATURE: 97.5 F | OXYGEN SATURATION: 99 % | BODY MASS INDEX: 28.09 KG/M2 | HEIGHT: 67 IN | DIASTOLIC BLOOD PRESSURE: 78 MMHG | WEIGHT: 179 LBS

## 2023-08-30 DIAGNOSIS — I10 PRIMARY HYPERTENSION: ICD-10-CM

## 2023-08-30 DIAGNOSIS — E55.9 VITAMIN D DEFICIENCY: Primary | ICD-10-CM

## 2023-08-30 DIAGNOSIS — I10 ESSENTIAL HYPERTENSION: ICD-10-CM

## 2023-08-30 DIAGNOSIS — Z12.5 PROSTATE CANCER SCREENING: ICD-10-CM

## 2023-08-30 DIAGNOSIS — Z00.00 HEALTHCARE MAINTENANCE: ICD-10-CM

## 2023-08-30 DIAGNOSIS — E11.65 TYPE 2 DIABETES MELLITUS WITH HYPERGLYCEMIA, WITHOUT LONG-TERM CURRENT USE OF INSULIN (HCC): ICD-10-CM

## 2023-08-30 DIAGNOSIS — E78.01 FAMILIAL HYPERCHOLESTEROLEMIA: ICD-10-CM

## 2023-08-30 PROCEDURE — 99214 OFFICE O/P EST MOD 30 MIN: CPT | Performed by: INTERNAL MEDICINE

## 2023-08-30 PROCEDURE — G0439 PPPS, SUBSEQ VISIT: HCPCS | Performed by: INTERNAL MEDICINE

## 2023-08-30 NOTE — ASSESSMENT & PLAN NOTE
Patient's blood pressure is well controlled. Patient will continue present medication and surveillance.

## 2023-08-30 NOTE — PROGRESS NOTES
Answers for HPI/ROS submitted by the patient on 8/27/2023  How often during the last year have you found that you were not able to stop drinking once you had started?: 0 - never  How often during the last year have you failed to do what was normally expected from you because of drinking?: 0 - never  How often during the last year have you needed a first drink in the morning to get yourself going after a heavy drinking session?: 0 - never  How often during the last year have you had a feeling of guilt or remorse after drinking?: 0 - never  How often during the last year have you been unable to remember what happened the night before because you had been drinking?: 0 - never  Have you or someone else been injured as a result of your drinking?: 0 - no  Has a relative or friend or a doctor or another health worker been concerned about your drinking or suggested you cut down?: 0 - no  How would you rate your overall health?: excellent  Compared to last year, how is your physical health?: slightly better  In general, how satisfied are you with your life?: satisfied  Compared to last year, how is your eyesight?: same  Compared to last year, how is your hearing?: same  Compared to last year, how is your emotional/mental health?: much better  How often is anger a problem for you?: never, rarely  How often do you feel unusually tired/fatigued?: sometimes  In the past 7 days, how much pain have you experienced?: some  If you answered "some" or "a lot", please rate the severity of your pain on a scale of 1 to 10 (1 being the least severe pain and 10 being the most intense pain). : 1/10  In the past 6 months, have you lost or gained 10 pounds without trying?: Yes  Additional Comments: Lots of physical work  One or more falls in the last year: No  Do you have trouble with the stairs inside or outside your home?: No  Does your home have working smoke alarms?: Yes  Does your home have a carbon monoxide monitor?: No  Which safety hazards (if any) have you experienced in your home? Please select all that apply.: none  How would you describe your current diet? Please select all that apply.: Diabetic  In addition to prescription medications, are you taking any over-the-counter supplements?: Yes  If yes, what supplements are you taking?: vitamin d  Can you manage your medications?: Yes  Are you currently taking any opioid medications?: No  Can you walk and transfer into and out of your bed and chair?: Yes  Can you dress and groom yourself?: Yes  Can you bathe or shower yourself?: Yes  Can you feed yourself?: Yes  Can you do your laundry/ housekeeping?: Yes  Can you manage your money, pay your bills, and track your expenses?: Yes  Can you make your own meals?: Yes  Can you do your own shopping?: Yes  Within the last 12 months, have you had any hospitalizations or Emergency Department visits?: No  Do you have a living will?: Yes  Do you have a Durable POA (Power of ) for healthcare decisions?: Yes  Do you have an Advanced Directive for end of life decisions?: Yes  How often have you used an illegal drug (including marijuana) or a prescription medication for non-medical reasons in the past year?: never  What is the typical number of drinks you consume in a day?: 3  What is the typical number of drinks you consume in a week?: 3  How often did you have a drink containing alcohol in the past year?: 4 or more times a week  How many drinks did you have on a typical day  when you were drinking in the past year?: 1 to 2  How often did you have 6 or more drinks on one occasion in the past year?: never    Name: Casandra Valdivia      : 1950      MRN: [id number]  Encounter Provider: Kailey Schultz DO  Encounter Date: 2023   Encounter department: [address]     1. Vitamin D deficiency  Assessment & Plan:  Continues to take his vitamin D supplements.   We will continue to monitor his blood test.    Orders:  -     Vitamin D 25 hydroxy; Future    2. Healthcare maintenance  Assessment & Plan:  Patient is a 51-year-old male who is here today for repeat Medicare visit. Patient did have extensive lab testing performed prior to the visit today and we did discuss the results at length with the patient. Patient relates in general he is doing relatively well and relates that he is just returned from a vacation in [place] and [place] with his wife. His sugars are showing adequate control as well as his blood pressure and he has been working hard at weight loss keeping physically active especially working around his house. Patient did undergo full physical exam today in the office with results as noted. Patient will continue present medication treatment. Rechecking labs including a basic metabolic profile hemoglobin A1c and a PSA when he returns to the office in a few months but this will depend mostly on when he returns from [place]. 3. Type 2 diabetes mellitus with hyperglycemia, without long-term current use of insulin ([facility])  Assessment & Plan:  Patient's blood sugars have been showing excellent control, last hemoglobin A1c is 5.7. He continues on medication as previously and is watching his diet closely. He is up-to-date with diabetic eye exam and diabetic foot exam was performed today in the office. He was given a slip to check on a fasting blood sugar hemoglobin A1c with his next visit. He admits that he is not always perfect as far as diet is concerned  Lab Results   Component Value Date    HGBA1C 5.7 04/26/2023       Orders:  -     Hemoglobin A1C; Future    4. Familial hypercholesterolemia  Assessment & Plan:  Patient has a history of hyperlipidemia. Remains on atorvastatin 10 mg daily. Cholesterol is showing excellent control. Patient was told again the importance of watching his intake of fats and cholesterol with his diet.       5. Prostate cancer screening  -     PSA, Total Screen; Future    6. Essential hypertension  -     Basic metabolic panel; Future    7. Primary hypertension  Assessment & Plan:  Patient's blood pressure is well controlled. Patient will continue present medication and surveillance. Subjective     Patient is a 19-year-old male history of medical problems outlined previously who is here today for repeat Medicare wellness visit. He did have labs performed prior to the visit today and we did discuss the results. Patient states in general he is feeling well physically and mentally and has no new complaints or concerns. Extremely busy working around the house, has just [place] [facility] [place] Review of Systems   Constitutional: Negative. HENT: Negative. Eyes: Negative. Respiratory: Negative. Cardiovascular: Negative. Gastrointestinal: Negative. Endocrine: Negative. Genitourinary: Negative. Musculoskeletal: Negative. Some diffuse arthritic aches and pains but nothing new or disabling   Skin: Negative. Allergic/Immunologic: Negative. Neurological: Negative. Hematological: Negative. Psychiatric/Behavioral: Negative.         Past Medical History:   Diagnosis Date   • Allergic Bees   • Diabetes ([address])    • Diabetes mellitus ([address])    • HL (hearing loss) 11/11/2020   • Hyperlipidemia    • Hypertension      Past Surgical History:   Procedure Laterality Date   • APPENDECTOMY       Family History   Problem Relation Age of Onset   • Parkinsonism Mother    • Arthritis Mother    • Hearing loss Mother         at age 80   • Diabetes Father    • Pancreatic cancer Father    • Colon cancer Father    • Asthma Father    • Cancer Father         colon cancer   • Hypertension Family    • Hearing loss Family      Social History     Socioeconomic History   • Marital status: /Civil Union     Spouse name: None   • Number of children: None   • Years of education: None   • Highest education level: None Occupational History   • None   Tobacco Use   • Smoking status: Former     Packs/day: 1.00     Years: 25.00     Total pack years: 25.00     Types: Cigars, Cigarettes     Quit date: 1999     Years since quittin.0   • Smokeless tobacco: Never   Vaping Use   • Vaping Use: Never used   Substance and Sexual Activity   • Alcohol use: Yes     Alcohol/week: 3.0 standard drinks of alcohol     Types: 3 Glasses of wine per week     Comment: wine   • Drug use: Never   • Sexual activity: Yes     Partners: Female     Birth control/protection: None   Other Topics Concern   • None   Social History Narrative   • None     Social Determinants of Health     Financial Resource Strain: Low Risk  (2023)    Overall Financial Resource Strain (CARDIA)    • Difficulty of Paying Living Expenses: Not hard at all   Food Insecurity: Not on file   Transportation Needs: No Transportation Needs (2023)    PRAPARE - Transportation    • Lack of Transportation (Medical): No    • Lack of Transportation (Non-Medical):  No   Physical Activity: Not on file   Stress: Not on file   Social Connections: Not on file   Intimate Partner Violence: Not on file   Housing Stability: Not on file     Current Outpatient Medications on File Prior to Visit   Medication Sig   • albuterol (PROVENTIL HFA,VENTOLIN HFA) 90 mcg/act inhaler Inhale 2 puffs every 6 (six) hours as needed for wheezing or shortness of breath   • atorvastatin (LIPITOR) 10 mg tablet Take 1 tablet (10 mg total) by mouth daily   • clotrimazole-betamethasone (LOTRISONE) 1-0.05 % cream Apply topically 2 (two) times a day   • ergocalciferol (VITAMIN D2) 50,000 units Take by mouth   • fluticasone (FLONASE) 50 mcg/act nasal spray 1 spray into each nostril daily   • glucose blood (OneTouch Ultra) test strip Test blood sugars daily   • losartan (COZAAR) 50 mg tablet Take 1 tablet (50 mg total) by mouth daily   • metFORMIN (GLUCOPHAGE-XR) 500 mg 24 hr tablet TAKE 1 TABLET BY MOUTH TWICE A DAY WITH MEALS (Patient taking differently: 500 mg daily with breakfast)   • EPINEPHrine (EPIPEN) 0.3 mg/0.3 mL SOAJ Inject 0.3 mL (0.3 mg total) into a muscle once for 1 dose     Allergies   Allergen Reactions   • Bee Venom Syncope     Immunization History   Administered Date(s) Administered   • COVID-19 MODERNA VACC 0.5 ML IM 02/19/2021, 03/19/2021, 10/30/2021, 04/19/2022   • COVID-19 Moderna Vac BIVALENT 12 Yr+ IM (BOOSTER ONLY) 0.5 ML 10/03/2022, 05/22/2023   • INFLUENZA 10/01/2015, 09/26/2016, 09/29/2017, 09/26/2018, 11/01/2018   • Influenza, high dose seasonal 0.7 mL 09/23/2020, 12/09/2021   • Pneumococcal Conjugate 13-Valent 12/09/2021   • Pneumococcal Conjugate Vaccine 20-valent (Pcv20), Polysace 05/16/2023       Objective     /78 (BP Location: Right arm, Patient Position: Sitting, Cuff Size: Standard)   Pulse 62   Temp 97.5 °F (36.4 °C)   Ht 5' 7.32" (1.71 m)   Wt 81.2 kg (179 lb)   SpO2 99%   BMI 27.77 kg/m²     Physical Exam  Vitals and nursing note reviewed. Constitutional:       General: He is not in acute distress. Appearance: Normal appearance. He is not ill-appearing, toxic-appearing or diaphoretic. Comments: Pleasant, articulate 27-year-old male who is awake alert in no acute distress oriented x3   HENT:      Head: Normocephalic and atraumatic. Right Ear: Tympanic membrane, ear canal and external ear normal. There is no impacted cerumen. Left Ear: Tympanic membrane, ear canal and external ear normal. There is no impacted cerumen. Nose: Nose normal. No congestion or rhinorrhea. Mouth/Throat:      Mouth: Mucous membranes are moist.      Pharynx: Oropharynx is clear. No oropharyngeal exudate or posterior oropharyngeal erythema. Eyes:      General: No scleral icterus. Right eye: No discharge. Left eye: No discharge. Extraocular Movements: Extraocular movements intact.       Conjunctiva/sclera: Conjunctivae normal.      Pupils: Pupils are equal, round, and reactive to light. Neck:      Vascular: No carotid bruit. Cardiovascular:      Rate and Rhythm: Normal rate and regular rhythm. Pulses: Normal pulses. Heart sounds: Normal heart sounds. No murmur heard. No friction rub. No gallop. Pulmonary:      Effort: Pulmonary effort is normal. No respiratory distress. Breath sounds: Normal breath sounds. No stridor. No wheezing, rhonchi or rales. Chest:      Chest wall: No tenderness. Abdominal:      General: Abdomen is flat. Bowel sounds are normal. There is no distension. Palpations: Abdomen is soft. There is no mass. Tenderness: There is no abdominal tenderness. There is no right CVA tenderness, left CVA tenderness, guarding or rebound. Hernia: No hernia is present. Genitourinary:     Penis: Normal.       Testes: Normal.      Prostate: Normal.      Rectum: Normal.   Musculoskeletal:         General: No swelling, tenderness, deformity or signs of injury. Normal range of motion. Cervical back: Normal range of motion and neck supple. No rigidity or tenderness. Right lower leg: No edema. Left lower leg: No edema. Lymphadenopathy:      Cervical: No cervical adenopathy. Skin:     General: Skin is warm and dry. Capillary Refill: Capillary refill takes less than 2 seconds. Coloration: Skin is not jaundiced or pale. Findings: No bruising, erythema, lesion or rash. Neurological:      General: No focal deficit present. Mental Status: He is alert and oriented to person, place, and time. Mental status is at baseline. Cranial Nerves: No cranial nerve deficit. Sensory: No sensory deficit. Motor: No weakness. Coordination: Coordination normal.      Gait: Gait normal.      Deep Tendon Reflexes: Reflexes normal.   Psychiatric:         Mood and Affect: Mood normal.         Behavior: Behavior normal.         Thought Content:  Thought content normal.         Judgment: Judgment normal.       Natividad Riff, DO

## 2023-08-30 NOTE — ASSESSMENT & PLAN NOTE
Patient is a 72-year-old male who is here today for repeat Medicare visit. Patient did have extensive lab testing performed prior to the visit today and we did discuss the results at length with the patient. Patient relates in general he is doing relatively well and relates that he is just returned from a vacation in [place] and [place] with his wife. His sugars are showing adequate control as well as his blood pressure and he has been working hard at weight loss keeping physically active especially working around his house. Patient did undergo full physical exam today in the office with results as noted. Patient will continue present medication treatment. Rechecking labs including a basic metabolic profile hemoglobin A1c and a PSA when he returns to the office in a few months but this will depend mostly on when he returns from [place].

## 2023-08-30 NOTE — ASSESSMENT & PLAN NOTE
Patient has a history of hyperlipidemia. Remains on atorvastatin 10 mg daily. Cholesterol is showing excellent control. Patient was told again the importance of watching his intake of fats and cholesterol with his diet.

## 2023-08-30 NOTE — PROGRESS NOTES
Assessment and Plan:     Problem List Items Addressed This Visit    None       Preventive health issues were discussed with patient, and age appropriate screening tests were ordered as noted in patient's After Visit Summary. Personalized health advice and appropriate referrals for health education or preventive services given if needed, as noted in patient's After Visit Summary.      History of Present Illness:     Patient presents for a Medicare Wellness Visit    HPI   Patient Care Team:  Alissa Caballero DO as PCP - [address] [place] [address] DO Natalie [facility] PCP [facility]  Alissa Caballero DO     Review of Systems:     Review of Systems     Problem List:     Patient Active Problem List   Diagnosis   • Hyperlipidemia   • Hypertension   • Type 2 diabetes mellitus with hyperglycemia ([address])   • Vitamin D deficiency   • Healthcare maintenance   • Acute atopic conjunctivitis of both eyes   • Overweight   • Acute non-recurrent frontal sinusitis   • Bronchitis   • Occult blood in stools   • Left chronic serous otitis media   • Anaphylaxis   • Oral thrush   • Impingement syndrome of left shoulder   • Dermatitis      Past Medical and Surgical History:     Past Medical History:   Diagnosis Date   • Allergic Bees   • Diabetes ([address])    • Diabetes mellitus ([address])    • HL (hearing loss) 11/11/2020   • Hyperlipidemia    • Hypertension      Past Surgical History:   Procedure Laterality Date   • APPENDECTOMY        Family History:     Family History   Problem Relation Age of Onset   • Parkinsonism Mother    • Arthritis Mother    • Hearing loss Mother         at age 80   • Diabetes Father    • Pancreatic cancer Father    • Colon cancer Father    • Asthma Father    • Cancer Father         colon cancer   • Hypertension Family    • Hearing loss Family       Social History:     Social History     Socioeconomic History   • Marital status: /Civil Union     Spouse name: Not on file   • Number of children: Not on file   • Years of education: Not on file   • Highest education level: Not on file   Occupational History   • Not on file   Tobacco Use   • Smoking status: Former     Packs/day: 1.00     Years: 25.00     Total pack years: 25.00     Types: Cigars, Cigarettes     Quit date: 1999     Years since quittin.0   • Smokeless tobacco: Never   Vaping Use   • Vaping Use: Never used   Substance and Sexual Activity   • Alcohol use: Yes     Alcohol/week: 3.0 standard drinks of alcohol     Types: 3 Glasses of wine per week     Comment: wine   • Drug use: Never   • Sexual activity: Yes     Partners: Female     Birth control/protection: None   Other Topics Concern   • Not on file   Social History Narrative   • Not on file     Social Determinants of Health     Financial Resource Strain: Low Risk  (2023)    Overall Financial Resource Strain (CARDIA)    • Difficulty of Paying Living Expenses: Not hard at all   Food Insecurity: Not on file   Transportation Needs: No Transportation Needs (2023)    PRAPARE - Transportation    • Lack of Transportation (Medical): No    • Lack of Transportation (Non-Medical):  No   Physical Activity: Not on file   Stress: Not on file   Social Connections: Not on file   Intimate Partner Violence: Not on file   Housing Stability: Not on file      Medications and Allergies:     Current Outpatient Medications   Medication Sig Dispense Refill   • albuterol (PROVENTIL HFA,VENTOLIN HFA) 90 mcg/act inhaler Inhale 2 puffs every 6 (six) hours as needed for wheezing or shortness of breath 1 Inhaler 5   • atorvastatin (LIPITOR) 10 mg tablet Take 1 tablet (10 mg total) by mouth daily 90 tablet 0   • clotrimazole-betamethasone (LOTRISONE) 1-0.05 % cream Apply topically 2 (two) times a day 30 g 4   • EPINEPHrine (EPIPEN) 0.3 mg/0.3 mL SOAJ Inject 0.3 mL (0.3 mg total) into a muscle once for 1 dose 0.6 mL 3   • ergocalciferol (VITAMIN D2) 50,000 units Take by mouth     • fluticasone (FLONASE) 50 mcg/act nasal spray 1 spray into each nostril daily     • glucose blood (OneTouch Ultra) test strip Test blood sugars daily 100 each 3   • losartan (COZAAR) 50 mg tablet Take 1 tablet (50 mg total) by mouth daily 90 tablet 0   • metFORMIN (GLUCOPHAGE-XR) 500 mg 24 hr tablet TAKE 1 TABLET BY MOUTH TWICE A DAY WITH MEALS 180 tablet 0     No current facility-administered medications for this visit. Allergies   Allergen Reactions   • Bee Venom Syncope      Immunizations:     Immunization History   Administered Date(s) Administered   • COVID-19 MODERNA VACC 0.5 ML IM 02/19/2021, 03/19/2021, 10/30/2021, 04/19/2022   • COVID-19 Moderna Vac BIVALENT 12 Yr+ IM (BOOSTER ONLY) 0.5 ML 10/03/2022, 05/22/2023   • INFLUENZA 10/01/2015, 09/26/2016, 09/29/2017, 09/26/2018, 11/01/2018   • Influenza, high dose seasonal 0.7 mL 09/23/2020, 12/09/2021   • Pneumococcal Conjugate 13-Valent 12/09/2021   • Pneumococcal Conjugate Vaccine 20-valent (Pcv20), Polysace 05/16/2023      Health Maintenance:         Topic Date Due   • Hepatitis C Screening  Never done   • Colorectal Cancer Screening  08/09/2026         Topic Date Due   • Influenza Vaccine (1) 09/01/2023      Medicare Screening Tests and Risk Assessments:     Megan Villafuerte is here for his Subsequent Wellness visit. Health Risk Assessment:   Patient rates overall health as excellent. Patient feels that their physical health rating is slightly better. Patient is satisfied with their life. Eyesight was rated as same. Hearing was rated as same. Patient feels that their emotional and mental health rating is much better. Patients states they are never, rarely angry. Patient states they are sometimes unusually tired/fatigued. Pain experienced in the last 7 days has been some. Patient's pain rating has been 1/10. Patient states that he has experienced weight loss or gain in last 6 months. Lots of physical work    Fall Risk Screening:    In the past year, patient has experienced: no history of falling in past year Home Safety:  Patient does not have trouble with stairs inside or outside of their home. Patient has working smoke alarms and has no working carbon monoxide detector. Home safety hazards include: none. Nutrition:   Current diet is Diabetic. Medications:   Patient is currently taking over-the-counter supplements. OTC medications include: vitamin d. Patient is able to manage medications. Activities of Daily Living (ADLs)/Instrumental Activities of Daily Living (IADLs):   Walk and transfer into and out of bed and chair?: Yes  Dress and groom yourself?: Yes    Bathe or shower yourself?: Yes    Feed yourself? Yes  Do your laundry/housekeeping?: Yes  Manage your money, pay your bills and track your expenses?: Yes  Make your own meals?: Yes    Do your own shopping?: Yes    Previous Hospitalizations:   Any hospitalizations or ED visits within the last 12 months?: No      Advance Care Planning:   Living will: Yes    Durable POA for healthcare: Yes    Advanced directive: Yes      PREVENTIVE SCREENINGS      Cardiovascular Screening:    General: Screening Not Indicated and History Lipid Disorder      Diabetes Screening:     General: Screening Not Indicated and History Diabetes      Colorectal Cancer Screening:     General: Screening Current      Abdominal Aortic Aneurysm (AAA) Screening:    Risk factors include: age between 70-77 yo and tobacco use        Lung Cancer Screening:     General: Screening Not Indicated    Screening, Brief Intervention, and Referral to Treatment (SBIRT)    Screening  Typical number of drinks in a day: 3  Typical number of drinks in a week: 3  Interpretation: Low risk drinking behavior. AUDIT-C Screenin) How often did you have a drink containing alcohol in the past year? 4 or more times a week  2) How many drinks did you have on a typical day when you were drinking in the past year? 1 to 2  3) How often did you have 6 or more drinks on one occasion in the past year? never    AUDIT-C Score: 4  Interpretation: Score 4-12 (male): POSITIVE screen for alcohol misuse    AUDIT Screenin) How often during the last year have you found that you were not able to stop drinking once you had started? 0 - never  5) How often during the last year have you failed to do what was normally expected from you because of drinking? 0 - never  6) How often during the last year have you needed a first drink in the morning to get yourself going after a heavy drinking session? 0 - never  7) How often during the last year have you had a feeling of guilt or remorse after drinking? 0 - never  8) How often during the last year have you been unable to remember what happened the night before because you had been drinking? 0 - never  9) Have you or someone else been injured as a result of your drinking? 0 - no  10) Has a relative or friend or a doctor or another health worker been concerned about your drinking or suggested you cut down? 0 - no    AUDIT Score: 4  Interpretation: Low risk alcohol consumption    Single Item Drug Screening:  How often have you used an illegal drug (including marijuana) or a prescription medication for non-medical reasons in the past year? never    Single Item Drug Screen Score: 0  Interpretation: Negative screen for possible drug use disorder    No results found. Physical Exam:     There were no vitals taken for this visit.     Physical Exam     Kay Loyd DO

## 2023-08-30 NOTE — PATIENT INSTRUCTIONS
Medicare Preventive Visit Patient Instructions  Thank you for completing your Welcome to Medicare Visit or Medicare Annual Wellness Visit today. Your next wellness visit will be due in one year (8/30/2024). The screening/preventive services that you may require over the next 5-10 years are detailed below. Some tests may not apply to you based off risk factors and/or age. Screening tests ordered at today's visit but not completed yet may show as past due. Also, please note that scanned in results may not display below. Preventive Screenings:  Service Recommendations Previous Testing/Comments   Colorectal Cancer Screening  · Colonoscopy    · Fecal Occult Blood Test (FOBT)/Fecal Immunochemical Test (FIT)  · Fecal DNA/Cologuard Test  · Flexible Sigmoidoscopy Age: 43-73 years old   Colonoscopy: every 10 years (May be performed more frequently if at higher risk)  OR  FOBT/FIT: every 1 year  OR  Cologuard: every 3 years  OR  Sigmoidoscopy: every 5 years  Screening may be recommended earlier than age 39 if at higher risk for colorectal cancer. Also, an individualized decision between you and your healthcare provider will decide whether screening between the ages of 77-80 would be appropriate.  Colonoscopy: 08/10/2021  FOBT/FIT: Not on file  Cologuard: Not on file  Sigmoidoscopy: Not on file    Screening Current     Prostate Cancer Screening Individualized decision between patient and health care provider in men between ages of 53-66   Medicare will cover every 12 months beginning on the day after your 50th birthday PSA: 1.3 ng/mL           Hepatitis C Screening Once for adults born between 1945 and 1965  More frequently in patients at high risk for Hepatitis C Hep C Antibody: Not on file        Diabetes Screening 1-2 times per year if you're at risk for diabetes or have pre-diabetes Fasting glucose: 90 mg/dL (8/25/2023)  A1C: 5.7 (4/26/2023)  Screening Not Indicated  History Diabetes   Cholesterol Screening Once every 5 years if you don't have a lipid disorder. May order more often based on risk factors. Lipid panel: 08/25/2023  Screening Not Indicated  History Lipid Disorder      Other Preventive Screenings Covered by Medicare:  1. Abdominal Aortic Aneurysm (AAA) Screening: covered once if your at risk. You're considered to be at risk if you have a family history of AAA or a male between the age of 70-76 who smoking at least 100 cigarettes in your lifetime. 2. Lung Cancer Screening: covers low dose CT scan once per year if you meet all of the following conditions: (1) Age 48-67; (2) No signs or symptoms of lung cancer; (3) Current smoker or have quit smoking within the last 15 years; (4) You have a tobacco smoking history of at least 20 pack years (packs per day x number of years you smoked); (5) You get a written order from a healthcare provider. 3. Glaucoma Screening: covered annually if you're considered high risk: (1) You have diabetes OR (2) Family history of glaucoma OR (3)  aged 48 and older OR (3)  American aged 72 and older  3. Osteoporosis Screening: covered every 2 years if you meet one of the following conditions: (1) Have a vertebral abnormality; (2) On glucocorticoid therapy for more than 3 months; (3) Have primary hyperparathyroidism; (4) On osteoporosis medications and need to assess response to drug therapy. 5. HIV Screening: covered annually if you're between the age of 14-79. Also covered annually if you are younger than 13 and older than 72 with risk factors for HIV infection. For pregnant patients, it is covered up to 3 times per pregnancy.     Immunizations:  Immunization Recommendations   Influenza Vaccine Annual influenza vaccination during flu season is recommended for all persons aged >= 6 months who do not have contraindications   Pneumococcal Vaccine   * Pneumococcal conjugate vaccine = PCV13 (Prevnar 13), PCV15 (Vaxneuvance), PCV20 (Prevnar 20)  * Pneumococcal polysaccharide vaccine = PPSV23 (Pneumovax) Adults 2364 years old: 1-3 doses may be recommended based on certain risk factors  Adults 72 years old: 1-2 doses may be recommended based off what pneumonia vaccine you previously received   Hepatitis B Vaccine 3 dose series if at intermediate or high risk (ex: diabetes, end stage renal disease, liver disease)   Tetanus (Td) Vaccine - COST NOT COVERED BY MEDICARE PART B Following completion of primary series, a booster dose should be given every 10 years to maintain immunity against tetanus. Td may also be given as tetanus wound prophylaxis. Tdap Vaccine - COST NOT COVERED BY MEDICARE PART B Recommended at least once for all adults. For pregnant patients, recommended with each pregnancy. Shingles Vaccine (Shingrix) - COST NOT COVERED BY MEDICARE PART B  2 shot series recommended in those aged 48 and above     Health Maintenance Due:      Topic Date Due   • Hepatitis C Screening  Never done   • Colorectal Cancer Screening  08/09/2026     Immunizations Due:      Topic Date Due   • Influenza Vaccine (1) 09/01/2023     Advance Directives   What are advance directives? Advance directives are legal documents that state your wishes and plans for medical care. These plans are made ahead of time in case you lose your ability to make decisions for yourself. Advance directives can apply to any medical decision, such as the treatments you want, and if you want to donate organs. What are the types of advance directives? There are many types of advance directives, and each state has rules about how to use them. You may choose a combination of any of the following:  · Living will: This is a written record of the treatment you want. You can also choose which treatments you do not want, which to limit, and which to stop at a certain time. This includes surgery, medicine, IV fluid, and tube feedings. · Durable power of  for healthcare [place] [facility]):   This is a written record that states who you want to make healthcare choices for you when you are unable to make them for yourself. This person, called a proxy, is usually a family member or a friend. You may choose more than 1 proxy. · Do not resuscitate (DNR) order:  A DNR order is used in case your heart stops beating or you stop breathing. It is a request not to have certain forms of treatment, such as CPR. A DNR order may be included in other types of advance directives. · Medical directive: This covers the care that you want if you are in a coma, near death, or unable to make decisions for yourself. You can list the treatments you want for each condition. Treatment may include pain medicine, surgery, blood transfusions, dialysis, IV or tube feedings, and a ventilator (breathing machine). · Values history: This document has questions about your views, beliefs, and how you feel and think about life. This information can help others choose the care that you would choose. Why are advance directives important? An advance directive helps you control your care. Although spoken wishes may be used, it is better to have your wishes written down. Spoken wishes can be misunderstood, or not followed. Treatments may be given even if you do not want them. An advance directive may make it easier for your family to make difficult choices about your care. Weight Management   Why it is important to manage your weight:  Being overweight increases your risk of health conditions such as heart disease, high blood pressure, type 2 diabetes, and certain types of cancer. It can also increase your risk for osteoarthritis, sleep apnea, and other respiratory problems. Aim for a slow, steady weight loss. Even a small amount of weight loss can lower your risk of health problems. How to lose weight safely:  A safe and healthy way to lose weight is to eat fewer calories and get regular exercise.  You can lose up about 1 pound a week by decreasing the number of calories you eat by 500 calories each day. Healthy meal plan for weight management:  A healthy meal plan includes a variety of foods, contains fewer calories, and helps you stay healthy. A healthy meal plan includes the following:  · Eat whole-grain foods more often. A healthy meal plan should contain fiber. Fiber is the part of grains, fruits, and vegetables that is not broken down by your body. Whole-grain foods are healthy and provide extra fiber in your diet. Some examples of whole-grain foods are whole-wheat breads and pastas, oatmeal, brown rice, and bulgur. · Eat a variety of vegetables every day. Include dark, leafy greens such as spinach, kale, vidal greens, and mustard greens. Eat yellow and orange vegetables such as carrots, sweet potatoes, and winter squash. · Eat a variety of fruits every day. Choose fresh or canned fruit (canned in its own juice or light syrup) instead of juice. Fruit juice has very little or no fiber. · Eat low-fat dairy foods. Drink fat-free (skim) milk or 1% milk. Eat fat-free yogurt and low-fat cottage cheese. Try low-fat cheeses such as mozzarella and other reduced-fat cheeses. · Choose meat and other protein foods that are low in fat. Choose beans or other legumes such as split peas or lentils. Choose fish, skinless poultry (chicken or turkey), or lean cuts of red meat (beef or pork). Before you cook meat or poultry, cut off any visible fat. · Use less fat and oil. Try baking foods instead of frying them. Add less fat, such as margarine, sour cream, regular salad dressing and mayonnaise to foods. Eat fewer high-fat foods. Some examples of high-fat foods include french fries, doughnuts, ice cream, and cakes. · Eat fewer sweets. Limit foods and drinks that are high in sugar. This includes candy, cookies, regular soda, and sweetened drinks. Exercise:  Exercise at least 30 minutes per day on most days of the week.  Some examples of exercise include walking, biking, dancing, and swimming. You can also fit in more physical activity by taking the stairs instead of the elevator or parking farther away from stores. Ask your healthcare provider about the best exercise plan for you. Alcohol Use and Your Health    Drinking too much can harm your health. Excessive alcohol use leads to about 88,000 death in [facility] each year, and shortens the life of those who diet by almost 30 years. Further, excessive drinking cost the economy $249 billion in 2010. Most excessive drinkers are not alcohol dependent. Excessive alcohol use has immediate effects that increase the risk of many harmful health conditions. These are most often the result of binge drinking. Over time, excessive alcohol use can lead to the development of chronic diseases and other series health problems. What is considered a "drink"? Excessive alcohol use includes:  · Binge Drinking: For women, 4 or more drinks consumed on one occasion. For men, 5 or more drinks consumed on one occasion. · Heavy Drinking: For women, 8 or more drinks per week. For men, 15 or more drinks per week  · Any alcohol used by pregnant women  · Any alcohol used by those under the age of 21 years    If you choose to drink, do so in moderation:  · Do not drink at all if you are under the age of 24, or if you are or may be pregnant, or have health problems that could be made worse by drinking.   · For women, up to 1 drink per day  · For men, up to 2 drinks a day    No one should begin drinking or drink more frequently based on potential health benefits    Short-Term Health Risks:  · Injuries: motor vehicle crashes, falls, drownings, burns  · Violence: homicide, suicide, sexual assault, intimate partner violence  · Alcohol poisoning  · Reproductive health: risky sexual behaviors, unintended prengnacy, sexually transmitted diseases, miscarriage, stillbirth, fetal alcohol syndrome    Long-Term Health Risks:  · Chronic diseases: high blood pressure, heart disease, stroke, liver disease, digestive problems  · Cancers: breast, mouth and throat, liver, colon  · Learning and memory problems: dementia, poor school performance  · Mental health: depression, anxiety, insomnia  · Social problems: lost productivity, family problems, unemployment  · Alcohol dependence    For support and more information:  · Substance Abuse and [address]  Web Address: https://[employer]/    · Alcoholics Anonymous        Web Address: http://www.[employer]/    https://www.cdc.gov/alcohol/fact-sheets/alcohol-use.htm     © [employer] 2018 Information is for End User's use only and may not be sold, redistributed or otherwise used for commercial purposes.  All illustrations and images included in CareNotes® are the copyrighted property of [employer] or [address] [place] [address]

## 2023-08-30 NOTE — ASSESSMENT & PLAN NOTE
Patient's blood sugars have been showing excellent control, last hemoglobin A1c is 5.7. He continues on medication as previously and is watching his diet closely. He is up-to-date with diabetic eye exam and diabetic foot exam was performed today in the office. He was given a slip to check on a fasting blood sugar hemoglobin A1c with his next visit.   He admits that he is not always perfect as far as diet is concerned  Lab Results   Component Value Date    HGBA1C 5.7 04/26/2023

## 2023-10-30 DIAGNOSIS — E11.65 TYPE 2 DIABETES MELLITUS WITH HYPERGLYCEMIA, WITHOUT LONG-TERM CURRENT USE OF INSULIN (HCC): ICD-10-CM

## 2023-10-30 DIAGNOSIS — E78.01 FAMILIAL HYPERCHOLESTEROLEMIA: ICD-10-CM

## 2023-10-30 DIAGNOSIS — I10 ESSENTIAL HYPERTENSION: ICD-10-CM

## 2023-10-30 RX ORDER — ATORVASTATIN CALCIUM 10 MG/1
10 TABLET, FILM COATED ORAL DAILY
Qty: 90 TABLET | Refills: 0 | Status: SHIPPED | OUTPATIENT
Start: 2023-10-30

## 2023-10-30 RX ORDER — LOSARTAN POTASSIUM 50 MG/1
50 TABLET ORAL DAILY
Qty: 90 TABLET | Refills: 0 | Status: SHIPPED | OUTPATIENT
Start: 2023-10-30

## 2024-02-03 DIAGNOSIS — I10 ESSENTIAL HYPERTENSION: ICD-10-CM

## 2024-02-03 DIAGNOSIS — E11.65 TYPE 2 DIABETES MELLITUS WITH HYPERGLYCEMIA, WITHOUT LONG-TERM CURRENT USE OF INSULIN (HCC): ICD-10-CM

## 2024-02-03 DIAGNOSIS — E78.01 FAMILIAL HYPERCHOLESTEROLEMIA: ICD-10-CM

## 2024-02-05 RX ORDER — ATORVASTATIN CALCIUM 10 MG/1
10 TABLET, FILM COATED ORAL DAILY
Qty: 90 TABLET | Refills: 0 | Status: SHIPPED | OUTPATIENT
Start: 2024-02-05

## 2024-02-05 RX ORDER — LOSARTAN POTASSIUM 50 MG/1
50 TABLET ORAL DAILY
Qty: 90 TABLET | Refills: 0 | Status: SHIPPED | OUTPATIENT
Start: 2024-02-05

## 2024-02-16 ENCOUNTER — TELEPHONE (OUTPATIENT)
Dept: OTHER | Facility: OTHER | Age: 74
End: 2024-02-16

## 2024-02-16 DIAGNOSIS — E11.65 TYPE 2 DIABETES MELLITUS WITH HYPERGLYCEMIA, WITHOUT LONG-TERM CURRENT USE OF INSULIN (HCC): Primary | ICD-10-CM

## 2024-02-16 RX ORDER — BLOOD SUGAR DIAGNOSTIC
STRIP MISCELLANEOUS
Qty: 100 EACH | Refills: 3 | Status: CANCELLED | OUTPATIENT
Start: 2024-02-16

## 2024-02-16 RX ORDER — BLOOD-GLUCOSE METER
EACH MISCELLANEOUS
Status: CANCELLED | OUTPATIENT
Start: 2024-02-16

## 2024-02-16 RX ORDER — LANCETS 33 GAUGE
EACH MISCELLANEOUS
Qty: 100 EACH | Refills: 3 | Status: CANCELLED | OUTPATIENT
Start: 2024-02-16

## 2024-02-21 PROBLEM — J01.10 ACUTE NON-RECURRENT FRONTAL SINUSITIS: Status: RESOLVED | Noted: 2020-01-17 | Resolved: 2024-02-21

## 2024-02-21 PROBLEM — H10.13 ACUTE ATOPIC CONJUNCTIVITIS OF BOTH EYES: Status: RESOLVED | Noted: 2018-12-03 | Resolved: 2024-02-21

## 2024-02-21 PROBLEM — Z00.00 HEALTHCARE MAINTENANCE: Status: RESOLVED | Noted: 2018-09-07 | Resolved: 2024-02-21

## 2024-02-27 ENCOUNTER — TELEPHONE (OUTPATIENT)
Dept: INTERNAL MEDICINE CLINIC | Facility: CLINIC | Age: 74
End: 2024-02-27

## 2024-02-27 DIAGNOSIS — E11.65 TYPE 2 DIABETES MELLITUS WITH HYPERGLYCEMIA, WITHOUT LONG-TERM CURRENT USE OF INSULIN (HCC): Primary | ICD-10-CM

## 2024-02-27 NOTE — TELEPHONE ENCOUNTER
Hi Dr. Smallwood,  Patient's wife called and asked if a blood glucose test kit could be ordered for the patient. They are in [place] and the patient forgot his glucose monitor at home. Patient would like it sent over to [facility] in [place], [place],  [address] and the phone number is: [phone]. Thank you.

## 2024-02-29 RX ORDER — LANCETS
EACH MISCELLANEOUS
Qty: 100 EACH | Refills: 0 | Status: SHIPPED | OUTPATIENT
Start: 2024-02-29

## 2024-02-29 RX ORDER — BLOOD-GLUCOSE METER
EACH MISCELLANEOUS
Qty: 1 KIT | Refills: 0 | Status: SHIPPED | OUTPATIENT
Start: 2024-02-29

## 2024-02-29 RX ORDER — BLOOD SUGAR DIAGNOSTIC
STRIP MISCELLANEOUS
Qty: 100 EACH | Refills: 0 | Status: SHIPPED | OUTPATIENT
Start: 2024-02-29

## 2024-03-21 RX ORDER — BLOOD-GLUCOSE METER
KIT MISCELLANEOUS
Qty: 1 KIT | Refills: 0 | Status: SHIPPED | OUTPATIENT
Start: 2024-03-21

## 2024-05-01 DIAGNOSIS — I10 ESSENTIAL HYPERTENSION: ICD-10-CM

## 2024-05-01 DIAGNOSIS — E11.65 TYPE 2 DIABETES MELLITUS WITH HYPERGLYCEMIA, WITHOUT LONG-TERM CURRENT USE OF INSULIN (HCC): ICD-10-CM

## 2024-05-01 DIAGNOSIS — E78.01 FAMILIAL HYPERCHOLESTEROLEMIA: ICD-10-CM

## 2024-05-03 RX ORDER — LOSARTAN POTASSIUM 50 MG/1
50 TABLET ORAL DAILY
Qty: 90 TABLET | Refills: 1 | Status: SHIPPED | OUTPATIENT
Start: 2024-05-03

## 2024-05-03 RX ORDER — ATORVASTATIN CALCIUM 10 MG/1
10 TABLET, FILM COATED ORAL DAILY
Qty: 90 TABLET | Refills: 1 | Status: SHIPPED | OUTPATIENT
Start: 2024-05-03

## 2024-05-09 ENCOUNTER — OFFICE VISIT (OUTPATIENT)
Dept: INTERNAL MEDICINE CLINIC | Facility: CLINIC | Age: 74
End: 2024-05-09
Payer: MEDICARE

## 2024-05-09 VITALS
BODY MASS INDEX: 28.09 KG/M2 | DIASTOLIC BLOOD PRESSURE: 68 MMHG | HEIGHT: 67 IN | SYSTOLIC BLOOD PRESSURE: 122 MMHG | WEIGHT: 179 LBS | TEMPERATURE: 97.5 F

## 2024-05-09 DIAGNOSIS — I10 PRIMARY HYPERTENSION: ICD-10-CM

## 2024-05-09 DIAGNOSIS — Z12.5 PROSTATE CANCER SCREENING: ICD-10-CM

## 2024-05-09 DIAGNOSIS — Z00.00 MEDICARE ANNUAL WELLNESS VISIT, SUBSEQUENT: Primary | ICD-10-CM

## 2024-05-09 DIAGNOSIS — E11.65 TYPE 2 DIABETES MELLITUS WITH HYPERGLYCEMIA, WITHOUT LONG-TERM CURRENT USE OF INSULIN (HCC): ICD-10-CM

## 2024-05-09 DIAGNOSIS — E78.01 FAMILIAL HYPERCHOLESTEROLEMIA: ICD-10-CM

## 2024-05-09 PROCEDURE — 99214 OFFICE O/P EST MOD 30 MIN: CPT | Performed by: INTERNAL MEDICINE

## 2024-05-09 PROCEDURE — G2211 COMPLEX E/M VISIT ADD ON: HCPCS | Performed by: INTERNAL MEDICINE

## 2024-05-09 NOTE — PROGRESS NOTES
Name: Jim Snow      : 1950      MRN: [id number]  Encounter Provider: Riky Menjivar DO  Encounter Date: 2024   Encounter department: [facility] INTERNAL MEDICINE    Assessment & Plan     1. Medicare annual wellness visit, subsequent  -     Comprehensive metabolic panel; Future; Expected date: 2024  -     CBC and differential; Future; Expected date: 2024  -     Lipid panel; Future; Expected date: 2024  -     Urinalysis with microscopic; Future; Expected date: 2024  -     Hemoglobin A1C; Future; Expected date: 2024  -     Albumin / creatinine urine ratio; Future; Expected date: 2024  -     PSA, Total Screen; Future; Expected date: 2024    2. Primary hypertension  Assessment & Plan:  History of hypertension.  Remains on Cozaar 50 mg daily.  Blood pressure is well-controlled.  Check on his renal function.  Make adjustments with medication if needed in the future if blood pressure is showing poor control.    Orders:  -     Basic metabolic panel    3. Type 2 diabetes mellitus with hyperglycemia, without long-term current use of insulin ([facility])  Assessment & Plan:  Patient has been away in [place] through the winter and just has returned from a job in [place].  He has not had any lab testing looking his blood sugar in a prolonged period of time and states had recent episodes where he was working extremely hard physically and did check his blood sugar afterwards and thought it was elevated at 150.  I agree that after exertion without eating that this is relatively high blood sugar reading.  He admits that he is not always perfect with his diet and is compliant with his medication but taking the metformin 2 pills with breakfast daily.  Patient was given a slip to check on a fasting blood sugar hemoglobin A1c now and we will call the patient if we need to make any adjustments with his medication.  Reminded the patient again the importance of watching his  diet trying to limit his intake of concentrated sweets, simple carbohydrates and calories overall.  Patient is staying physically active.  Lab Results   Component Value Date    HGBA1C 5.7 04/26/2023       Orders:  -     Hemoglobin A1C  -     Hemoglobin A1C; Future; Expected date: 08/09/2024  -     Albumin / creatinine urine ratio; Future; Expected date: 08/09/2024    4. Familial hypercholesterolemia  Assessment & Plan:  History of hyperlipidemia.  He remains on atorvastatin 10 mg daily and cholesterol has been controlled.  Check a lipid profile when he returns to the office in August for routine physical.  Again reminded the patient the importance of watching his intake of fats and cholesterol.      5. Prostate cancer screening  -     PSA, Total Screen; Future; Expected date: 08/09/2024           Subjective     Patient is a 73-year-old male history of extensive medical problems outlined previously who was here today for follow-up.  Recently had an incident where he was working outside, a lot of exertion physically and did then check his blood sugar which was elevated to 150.  His wife has some concerns that he has not had any testing done recently to check on his blood sugar readings.  Otherwise patient states he is doing well and he had spent the winter with his wife in [place] and recently returned from a job in [place]      Review of Systems   Constitutional: Negative.    HENT: Negative.     Eyes: Negative.    Respiratory: Negative.     Cardiovascular: Negative.    Gastrointestinal: Negative.    Endocrine: Negative.    Genitourinary: Negative.    Musculoskeletal: Negative.    Skin: Negative.    Allergic/Immunologic: Negative.    Neurological: Negative.    Hematological: Negative.    Psychiatric/Behavioral: Negative.         Past Medical History:   Diagnosis Date    Allergic Bees    Diabetes (HCC)     Diabetes mellitus (HCC)     HL (hearing loss) 11/11/2020    Hyperlipidemia     Hypertension     Visual impairment      floater in eye     Past Surgical History:   Procedure Laterality Date    APPENDECTOMY       Family History   Problem Relation Age of Onset    Parkinsonism Mother     Arthritis Mother     Hearing loss Mother         at age 91    Dementia Mother     Diabetes Father     Pancreatic cancer Father     Colon cancer Father     Asthma Father     Cancer Father         colon cancer    Dementia Father     Hypertension Family     Hearing loss Family      Social History     Socioeconomic History    Marital status: /Civil Union     Spouse name: None    Number of children: None    Years of education: None    Highest education level: None   Occupational History    None   Tobacco Use    Smoking status: Former     Types: Cigars    Smokeless tobacco: Never   Vaping Use    Vaping status: Never Used   Substance and Sexual Activity    Alcohol use: Yes     Alcohol/week: 7.0 standard drinks of alcohol     Types: 7 Glasses of wine per week     Comment: wine    Drug use: Never    Sexual activity: Not Currently     Partners: Female     Birth control/protection: None   Other Topics Concern    None   Social History Narrative    None     Social Determinants of Health     Financial Resource Strain: Low Risk  (8/27/2023)    Overall Financial Resource Strain (CARDIA)     Difficulty of Paying Living Expenses: Not hard at all   Food Insecurity: Not on file   Transportation Needs: No Transportation Needs (8/27/2023)    PRAPARE - Transportation     Lack of Transportation (Medical): No     Lack of Transportation (Non-Medical): No   Physical Activity: Not on file   Stress: Not on file   Social Connections: Not on file   Intimate Partner Violence: Not on file   Housing Stability: Not on file     Current Outpatient Medications on File Prior to Visit   Medication Sig    albuterol (PROVENTIL HFA,VENTOLIN HFA) 90 mcg/act inhaler Inhale 2 puffs every 6 (six) hours as needed for wheezing or shortness of breath    atorvastatin (LIPITOR) 10 mg tablet  Take 1 tablet (10 mg total) by mouth daily    Blood Glucose Monitoring Suppl (ONE TOUCH ULTRA 2) w/Device KIT Check blood sugars once daily. Please substitute with appropriate alternative as covered by patient's insurance. Dx: E11.65.  NPI [id number]    Blood Glucose Monitoring Suppl (OneTouch Verio Reflect) w/Device KIT Check blood sugars once daily. Please substitute with appropriate alternative as covered by patient's insurance. Dx: E11.65    clotrimazole-betamethasone (LOTRISONE) 1-0.05 % cream Apply topically 2 (two) times a day    ergocalciferol (VITAMIN D2) 50,000 units Take by mouth    fluticasone (FLONASE) 50 mcg/act nasal spray 1 spray into each nostril daily    glucose blood (OneTouch Ultra) test strip Test blood sugars daily    Lancets (onetouch ultrasoft) lancets Check blood sugars once daily. Please substitute with appropriate alternative as covered by patient's insurance. Dx: E11.65.  NPI [id number]    losartan (COZAAR) 50 mg tablet Take 1 tablet (50 mg total) by mouth daily    metFORMIN (GLUCOPHAGE-XR) 500 mg 24 hr tablet TAKE 1 TABLET BY MOUTH TWICE A DAY WITH MEALS (Patient taking differently: 500 mg daily with breakfast)    EPINEPHrine (EPIPEN) 0.3 mg/0.3 mL SOAJ Inject 0.3 mL (0.3 mg total) into a muscle once for 1 dose     Allergies   Allergen Reactions    Bee Venom Syncope     Immunization History   Administered Date(s) Administered    COVID-19 MODERNA VACC 0.5 ML IM 02/19/2021, 03/19/2021, 10/30/2021, 04/19/2022    COVID-19 Moderna Vac BIVALENT 12 Yr+ IM 0.5 ML 10/03/2022, 05/22/2023    COVID-19 Moderna mRNA Vaccine 12 Yr+ 50 mcg/0.5 mL (Spikevax) 09/28/2023    INFLUENZA 10/01/2015, 09/26/2016, 09/29/2017, 09/26/2018, 11/01/2018, 10/03/2022, 11/01/2023    Influenza, high dose seasonal 0.7 mL 09/23/2020, 12/09/2021    Pneumococcal Conjugate 13-Valent 12/09/2021    Pneumococcal Conjugate Vaccine 20-valent (Pcv20), Polysace 05/16/2023    Respiratory Syncytial Virus Vaccine (Recombinant,  "Adjuvanted) 11/08/2023       Objective     /68   Temp 97.5 °F (36.4 °C)   Ht 5' 7\" (1.702 m)   Wt 81.2 kg (179 lb)   BMI 28.04 kg/m²     Physical Exam  Vitals and nursing note reviewed.   Constitutional:       General: He is not in acute distress.     Appearance: Normal appearance. He is not ill-appearing, toxic-appearing or diaphoretic.      Comments: Pleasant, articulate, overweight 73-year-old male who is awake alert in no acute distress and oriented x 3   HENT:      Head: Normocephalic and atraumatic.      Right Ear: Tympanic membrane, ear canal and external ear normal. There is no impacted cerumen.      Left Ear: Tympanic membrane, ear canal and external ear normal. There is no impacted cerumen.      Nose: Nose normal. No congestion or rhinorrhea.      Mouth/Throat:      Mouth: Mucous membranes are moist.      Pharynx: Oropharynx is clear. No oropharyngeal exudate or posterior oropharyngeal erythema.   Eyes:      General: No scleral icterus.        Right eye: No discharge.         Left eye: No discharge.      Extraocular Movements: Extraocular movements intact.      Conjunctiva/sclera: Conjunctivae normal.      Pupils: Pupils are equal, round, and reactive to light.   Neck:      Vascular: No carotid bruit.   Cardiovascular:      Rate and Rhythm: Normal rate and regular rhythm.      Pulses: Normal pulses.      Heart sounds: Normal heart sounds. No murmur heard.     No friction rub. No gallop.   Pulmonary:      Effort: Pulmonary effort is normal. No respiratory distress.      Breath sounds: Normal breath sounds. No stridor. No wheezing, rhonchi or rales.   Chest:      Chest wall: No tenderness.   Abdominal:      General: Abdomen is flat. Bowel sounds are normal. There is no distension.      Palpations: Abdomen is soft. There is no mass.      Tenderness: There is no abdominal tenderness. There is no right CVA tenderness, left CVA tenderness, guarding or rebound.      Hernia: No hernia is present. "   Genitourinary:     Penis: Normal.       Testes: Normal.      Prostate: Normal.      Rectum: Normal.   Musculoskeletal:         General: No swelling, tenderness, deformity or signs of injury. Normal range of motion.      Cervical back: Normal range of motion and neck supple. No rigidity or tenderness.      Right lower leg: No edema.      Left lower leg: No edema.   Lymphadenopathy:      Cervical: No cervical adenopathy.   Skin:     General: Skin is warm and dry.      Capillary Refill: Capillary refill takes less than 2 seconds.      Coloration: Skin is not jaundiced or pale.      Findings: No bruising, erythema, lesion or rash.   Neurological:      General: No focal deficit present.      Mental Status: He is alert and oriented to person, place, and time. Mental status is at baseline.      Cranial Nerves: No cranial nerve deficit.      Sensory: No sensory deficit.      Motor: No weakness.      Coordination: Coordination normal.      Gait: Gait normal.      Deep Tendon Reflexes: Reflexes normal.   Psychiatric:         Mood and Affect: Mood normal.         Behavior: Behavior normal.         Thought Content: Thought content normal.         Judgment: Judgment normal.       Riky Menjivar,

## 2024-05-09 NOTE — ASSESSMENT & PLAN NOTE
Patient has been away in [place] through the winter and just has returned from a job in [place].  He has not had any lab testing looking his blood sugar in a prolonged period of time and states had recent episodes where he was working extremely hard physically and did check his blood sugar afterwards and thought it was elevated at 150.  I agree that after exertion without eating that this is relatively high blood sugar reading.  He admits that he is not always perfect with his diet and is compliant with his medication but taking the metformin 2 pills with breakfast daily.  Patient was given a slip to check on a fasting blood sugar hemoglobin A1c now and we will call the patient if we need to make any adjustments with his medication.  Reminded the patient again the importance of watching his diet trying to limit his intake of concentrated sweets, simple carbohydrates and calories overall.  Patient is staying physically active.  Lab Results   Component Value Date    HGBA1C 5.7 04/26/2023

## 2024-05-09 NOTE — ASSESSMENT & PLAN NOTE
History of hypertension.  Remains on Cozaar 50 mg daily.  Blood pressure is well-controlled.  Check on his renal function.  Make adjustments with medication if needed in the future if blood pressure is showing poor control.

## 2024-05-13 ENCOUNTER — APPOINTMENT (OUTPATIENT)
Dept: LAB | Age: 74
End: 2024-05-13
Payer: MEDICARE

## 2024-05-13 DIAGNOSIS — E11.65 TYPE 2 DIABETES MELLITUS WITH HYPERGLYCEMIA, WITHOUT LONG-TERM CURRENT USE OF INSULIN (HCC): ICD-10-CM

## 2024-05-13 DIAGNOSIS — I10 ESSENTIAL HYPERTENSION: ICD-10-CM

## 2024-05-13 LAB
ANION GAP SERPL CALCULATED.3IONS-SCNC: 11 MMOL/L (ref 4–13)
BUN SERPL-MCNC: 18 MG/DL (ref 5–25)
CALCIUM SERPL-MCNC: 9.3 MG/DL (ref 8.4–10.2)
CHLORIDE SERPL-SCNC: 105 MMOL/L (ref 96–108)
CO2 SERPL-SCNC: 24 MMOL/L (ref 21–32)
CREAT SERPL-MCNC: 1.01 MG/DL (ref 0.6–1.3)
EST. AVERAGE GLUCOSE BLD GHB EST-MCNC: 126 MG/DL
GFR SERPL CREATININE-BSD FRML MDRD: 74 ML/MIN/1.73SQ M
GLUCOSE P FAST SERPL-MCNC: 94 MG/DL (ref 65–99)
HBA1C MFR BLD: 6 %
POTASSIUM SERPL-SCNC: 4.7 MMOL/L (ref 3.5–5.3)
SODIUM SERPL-SCNC: 140 MMOL/L (ref 135–147)

## 2024-05-13 PROCEDURE — 80048 BASIC METABOLIC PNL TOTAL CA: CPT | Performed by: INTERNAL MEDICINE

## 2024-05-13 PROCEDURE — 36415 COLL VENOUS BLD VENIPUNCTURE: CPT | Performed by: INTERNAL MEDICINE

## 2024-05-13 PROCEDURE — 83036 HEMOGLOBIN GLYCOSYLATED A1C: CPT | Performed by: INTERNAL MEDICINE

## 2024-06-18 LAB
LEFT EYE DIABETIC RETINOPATHY: NORMAL
RIGHT EYE DIABETIC RETINOPATHY: NORMAL

## 2024-07-23 DIAGNOSIS — E11.65 TYPE 2 DIABETES MELLITUS WITH HYPERGLYCEMIA, WITHOUT LONG-TERM CURRENT USE OF INSULIN (HCC): ICD-10-CM

## 2024-07-23 DIAGNOSIS — I10 ESSENTIAL HYPERTENSION: ICD-10-CM

## 2024-07-23 DIAGNOSIS — E78.01 FAMILIAL HYPERCHOLESTEROLEMIA: ICD-10-CM

## 2024-07-24 RX ORDER — ATORVASTATIN CALCIUM 10 MG/1
10 TABLET, FILM COATED ORAL DAILY
Qty: 100 TABLET | Refills: 1 | Status: SHIPPED | OUTPATIENT
Start: 2024-07-24

## 2024-07-24 RX ORDER — LOSARTAN POTASSIUM 50 MG/1
50 TABLET ORAL DAILY
Qty: 100 TABLET | Refills: 1 | Status: SHIPPED | OUTPATIENT
Start: 2024-07-24

## 2024-07-24 RX ORDER — METFORMIN HYDROCHLORIDE 500 MG/1
500 TABLET, EXTENDED RELEASE ORAL 2 TIMES DAILY WITH MEALS
Qty: 200 TABLET | Refills: 1 | Status: SHIPPED | OUTPATIENT
Start: 2024-07-24

## 2024-08-30 ENCOUNTER — APPOINTMENT (OUTPATIENT)
Dept: LAB | Facility: CLINIC | Age: 74
End: 2024-08-30
Payer: MEDICARE

## 2024-08-30 ENCOUNTER — PATIENT MESSAGE (OUTPATIENT)
Dept: INTERNAL MEDICINE CLINIC | Facility: CLINIC | Age: 74
End: 2024-08-30

## 2024-08-30 DIAGNOSIS — Z12.5 PROSTATE CANCER SCREENING: ICD-10-CM

## 2024-08-30 DIAGNOSIS — Z00.00 MEDICARE ANNUAL WELLNESS VISIT, SUBSEQUENT: ICD-10-CM

## 2024-08-30 DIAGNOSIS — E11.65 TYPE 2 DIABETES MELLITUS WITH HYPERGLYCEMIA, WITHOUT LONG-TERM CURRENT USE OF INSULIN (HCC): ICD-10-CM

## 2024-08-30 DIAGNOSIS — E55.9 VITAMIN D DEFICIENCY: ICD-10-CM

## 2024-08-30 LAB
25(OH)D3 SERPL-MCNC: 65.2 NG/ML (ref 30–100)
ALBUMIN SERPL BCG-MCNC: 4.5 G/DL (ref 3.5–5)
ALP SERPL-CCNC: 51 U/L (ref 34–104)
ALT SERPL W P-5'-P-CCNC: 23 U/L (ref 7–52)
ANION GAP SERPL CALCULATED.3IONS-SCNC: 9 MMOL/L (ref 4–13)
AST SERPL W P-5'-P-CCNC: 19 U/L (ref 13–39)
BACTERIA UR QL AUTO: ABNORMAL /HPF
BASOPHILS # BLD AUTO: 0.07 THOUSANDS/ÂΜL (ref 0–0.1)
BASOPHILS NFR BLD AUTO: 1 % (ref 0–1)
BILIRUB SERPL-MCNC: 0.81 MG/DL (ref 0.2–1)
BILIRUB UR QL STRIP: NEGATIVE
BUN SERPL-MCNC: 19 MG/DL (ref 5–25)
CALCIUM SERPL-MCNC: 9.6 MG/DL (ref 8.4–10.2)
CHLORIDE SERPL-SCNC: 104 MMOL/L (ref 96–108)
CHOLEST SERPL-MCNC: 138 MG/DL
CLARITY UR: CLEAR
CO2 SERPL-SCNC: 26 MMOL/L (ref 21–32)
COLOR UR: ABNORMAL
CREAT SERPL-MCNC: 1.07 MG/DL (ref 0.6–1.3)
CREAT UR-MCNC: 170.7 MG/DL
EOSINOPHIL # BLD AUTO: 0.48 THOUSAND/ÂΜL (ref 0–0.61)
EOSINOPHIL NFR BLD AUTO: 6 % (ref 0–6)
ERYTHROCYTE [DISTWIDTH] IN BLOOD BY AUTOMATED COUNT: 13.6 % (ref 11.6–15.1)
EST. AVERAGE GLUCOSE BLD GHB EST-MCNC: 126 MG/DL
GFR SERPL CREATININE-BSD FRML MDRD: 69 ML/MIN/1.73SQ M
GLUCOSE P FAST SERPL-MCNC: 106 MG/DL (ref 65–99)
GLUCOSE UR STRIP-MCNC: NEGATIVE MG/DL
HBA1C MFR BLD: 6 %
HCT VFR BLD AUTO: 44.8 % (ref 36.5–49.3)
HDLC SERPL-MCNC: 67 MG/DL
HGB BLD-MCNC: 14.8 G/DL (ref 12–17)
HGB UR QL STRIP.AUTO: NEGATIVE
IMM GRANULOCYTES # BLD AUTO: 0.05 THOUSAND/UL (ref 0–0.2)
IMM GRANULOCYTES NFR BLD AUTO: 1 % (ref 0–2)
KETONES UR STRIP-MCNC: NEGATIVE MG/DL
LDLC SERPL CALC-MCNC: 58 MG/DL (ref 0–100)
LEUKOCYTE ESTERASE UR QL STRIP: NEGATIVE
LYMPHOCYTES # BLD AUTO: 2.46 THOUSANDS/ÂΜL (ref 0.6–4.47)
LYMPHOCYTES NFR BLD AUTO: 30 % (ref 14–44)
MCH RBC QN AUTO: 30.5 PG (ref 26.8–34.3)
MCHC RBC AUTO-ENTMCNC: 33 G/DL (ref 31.4–37.4)
MCV RBC AUTO: 92 FL (ref 82–98)
MICROALBUMIN UR-MCNC: 18.8 MG/L
MICROALBUMIN/CREAT 24H UR: 11 MG/G CREATININE (ref 0–30)
MONOCYTES # BLD AUTO: 0.67 THOUSAND/ÂΜL (ref 0.17–1.22)
MONOCYTES NFR BLD AUTO: 8 % (ref 4–12)
NEUTROPHILS # BLD AUTO: 4.43 THOUSANDS/ÂΜL (ref 1.85–7.62)
NEUTS SEG NFR BLD AUTO: 54 % (ref 43–75)
NITRITE UR QL STRIP: NEGATIVE
NON-SQ EPI CELLS URNS QL MICRO: ABNORMAL /HPF
NONHDLC SERPL-MCNC: 71 MG/DL
NRBC BLD AUTO-RTO: 0 /100 WBCS
PH UR STRIP.AUTO: 6 [PH]
PLATELET # BLD AUTO: 228 THOUSANDS/UL (ref 149–390)
PMV BLD AUTO: 10 FL (ref 8.9–12.7)
POTASSIUM SERPL-SCNC: 4.7 MMOL/L (ref 3.5–5.3)
PROT SERPL-MCNC: 6.7 G/DL (ref 6.4–8.4)
PROT UR STRIP-MCNC: ABNORMAL MG/DL
PSA SERPL-MCNC: 2.33 NG/ML (ref 0–4)
RBC # BLD AUTO: 4.85 MILLION/UL (ref 3.88–5.62)
RBC #/AREA URNS AUTO: ABNORMAL /HPF
SODIUM SERPL-SCNC: 139 MMOL/L (ref 135–147)
SP GR UR STRIP.AUTO: 1.02 (ref 1–1.03)
TRIGL SERPL-MCNC: 63 MG/DL
UROBILINOGEN UR STRIP-ACNC: <2 MG/DL
WBC # BLD AUTO: 8.16 THOUSAND/UL (ref 4.31–10.16)
WBC #/AREA URNS AUTO: ABNORMAL /HPF

## 2024-08-30 PROCEDURE — 82306 VITAMIN D 25 HYDROXY: CPT

## 2024-08-30 PROCEDURE — 82043 UR ALBUMIN QUANTITATIVE: CPT

## 2024-08-30 PROCEDURE — 36415 COLL VENOUS BLD VENIPUNCTURE: CPT

## 2024-08-30 PROCEDURE — 83036 HEMOGLOBIN GLYCOSYLATED A1C: CPT

## 2024-08-30 PROCEDURE — 80053 COMPREHEN METABOLIC PANEL: CPT

## 2024-08-30 PROCEDURE — 82570 ASSAY OF URINE CREATININE: CPT

## 2024-08-30 PROCEDURE — 85025 COMPLETE CBC W/AUTO DIFF WBC: CPT

## 2024-08-30 PROCEDURE — G0103 PSA SCREENING: HCPCS

## 2024-08-30 PROCEDURE — 81001 URINALYSIS AUTO W/SCOPE: CPT

## 2024-08-30 PROCEDURE — 80061 LIPID PANEL: CPT

## 2024-09-03 ENCOUNTER — OFFICE VISIT (OUTPATIENT)
Age: 74
End: 2024-09-03
Payer: MEDICARE

## 2024-09-03 VITALS
WEIGHT: 178 LBS | RESPIRATION RATE: 18 BRPM | BODY MASS INDEX: 27.94 KG/M2 | HEART RATE: 74 BPM | DIASTOLIC BLOOD PRESSURE: 68 MMHG | OXYGEN SATURATION: 96 % | SYSTOLIC BLOOD PRESSURE: 124 MMHG | HEIGHT: 67 IN

## 2024-09-03 DIAGNOSIS — E11.65 TYPE 2 DIABETES MELLITUS WITH HYPERGLYCEMIA, WITHOUT LONG-TERM CURRENT USE OF INSULIN (HCC): ICD-10-CM

## 2024-09-03 DIAGNOSIS — M75.42 IMPINGEMENT SYNDROME OF LEFT SHOULDER: Primary | ICD-10-CM

## 2024-09-03 DIAGNOSIS — E66.3 OVERWEIGHT: ICD-10-CM

## 2024-09-03 DIAGNOSIS — E78.01 FAMILIAL HYPERCHOLESTEROLEMIA: ICD-10-CM

## 2024-09-03 DIAGNOSIS — I10 PRIMARY HYPERTENSION: ICD-10-CM

## 2024-09-03 DIAGNOSIS — Z00.00 MEDICARE ANNUAL WELLNESS VISIT, SUBSEQUENT: ICD-10-CM

## 2024-09-03 PROBLEM — M25.512 ACUTE PAIN OF LEFT SHOULDER: Status: ACTIVE | Noted: 2024-09-03

## 2024-09-03 PROCEDURE — 99214 OFFICE O/P EST MOD 30 MIN: CPT | Performed by: INTERNAL MEDICINE

## 2024-09-03 PROCEDURE — G0438 PPPS, INITIAL VISIT: HCPCS | Performed by: INTERNAL MEDICINE

## 2024-09-03 NOTE — PROGRESS NOTES
Ambulatory Visit  Name: Jim Snow      : 1950      MRN: [id number]  Encounter Provider: Riky Menjivar DO  Encounter Date: 9/3/2024   Encounter department: [facility] INTERNAL MEDICINE    Assessment & Plan        Preventive health issues were discussed with patient, and age appropriate screening tests were ordered as noted in patient's After Visit Summary. Personalized health advice and appropriate referrals for health education or preventive services given if needed, as noted in patient's After Visit Summary.    History of Present Illness     HPI   Patient Care Team:  Riky Menjivar DO as PCP - General Sabino Kirkland DO as PCP - PCPBernard (RTE)  Riky Menjivar DO    Review of Systems  Medical History Reviewed by provider this encounter:       Annual Wellness Visit Questionnaire   Jim is here for his Subsequent Wellness visit.     Health Risk Assessment:   Patient rates overall health as excellent. Patient feels that their physical health rating is slightly better. Patient is very satisfied with their life. Eyesight was rated as same. Hearing was rated as same. Patient feels that their emotional and mental health rating is slightly better. Patients states they are never, rarely angry. Patient states they are never, rarely unusually tired/fatigued. Pain experienced in the last 7 days has been some. Patient's pain rating has been 2/10. Patient states that he has experienced weight loss or gain in last 6 months.     Depression Screening:   PHQ-2 Score: 0      Fall Risk Screening:   In the past year, patient has experienced: no history of falling in past year      Home Safety:  Patient does not have trouble with stairs inside or outside of their home. Patient has working smoke alarms and has working carbon monoxide detector. Home safety hazards include: none.     Nutrition:   Current diet is Regular.     Medications:   Patient is currently taking over-the-counter supplements. OTC  medications include: see medication list. Patient is able to manage medications.     Activities of Daily Living (ADLs)/Instrumental Activities of Daily Living (IADLs):   Walk and transfer into and out of bed and chair?: Yes  Dress and groom yourself?: Yes    Bathe or shower yourself?: Yes    Feed yourself? Yes  Do your laundry/housekeeping?: Yes  Manage your money, pay your bills and track your expenses?: Yes  Make your own meals?: Yes    Do your own shopping?: Yes    Previous Hospitalizations:   Any hospitalizations or ED visits within the last 12 months?: No      Advance Care Planning:   Living will: Yes    Durable POA for healthcare: Yes    Advanced directive: Yes      PREVENTIVE SCREENINGS      Cardiovascular Screening:    General: Screening Not Indicated and History Lipid Disorder      Diabetes Screening:     General: Screening Not Indicated and History Diabetes      Colorectal Cancer Screening:     General: Screening Current      Prostate Cancer Screening:    General: Screening Current      Abdominal Aortic Aneurysm (AAA) Screening:    Risk factors include: age between 65-74 yo and tobacco use        Lung Cancer Screening:     General: Screening Not Indicated    Screening, Brief Intervention, and Referral to Treatment (SBIRT)    Screening  Typical number of drinks in a day: 3  Typical number of drinks in a week: 21  Interpretation: Low risk drinking behavior.    AUDIT-C Screenin) How often did you have a drink containing alcohol in the past year? 4 or more times a week  2) How many drinks did you have on a typical day when you were drinking in the past year? 3 to 4    Single Item Drug Screening:  How often have you used an illegal drug (including marijuana) or a prescription medication for non-medical reasons in the past year? never    Single Item Drug Screen Score: 0  Interpretation: Negative screen for possible drug use disorder    Social Determinants of Health     Financial Resource Strain: Low Risk   "(8/27/2023)    Overall Financial Resource Strain (CARDIA)     Difficulty of Paying Living Expenses: Not hard at all   Food Insecurity: No Food Insecurity (8/30/2024)    Hunger Vital Sign     Worried About Running Out of Food in the Last Year: Never true     Ran Out of Food in the Last Year: Never true   Transportation Needs: No Transportation Needs (8/30/2024)    PRAPARE - Transportation     Lack of Transportation (Medical): No     Lack of Transportation (Non-Medical): No   Housing Stability: Unknown (8/30/2024)    Housing Stability Vital Sign     Unable to Pay for Housing in the Last Year: No     Homeless in the Last Year: No   Utilities: Not At Risk (8/30/2024)    [facility] Utilities     Threatened with loss of utilities: No     No results found.    Objective     Ht 5' 7\" (1.702 m)   BMI 28.04 kg/m²     Physical Exam      "

## 2024-09-03 NOTE — ASSESSMENT & PLAN NOTE
With the patient's BMI and he is concerned states reduce his calories in order to lose weight.  He is physically active but has no routine exercise program which we feel would be important also.  He is spending time golfing with his wife

## 2024-09-03 NOTE — ASSESSMENT & PLAN NOTE
Patient is a 73-year-old male history of diabetes mellitus type 2, hyperlipidemia, hypertension, DJD who is here today for repeat Medicare wellness visit.  He did have labs performed prior to the visit today we did discuss results at length with the patient.  Patient relates in general doing well.  Does have some discomfort to the area of the biceps tendon to his left shoulder for the past few weeks.  No specific accident or injury.  He is compliant with medication and his sugar and blood pressure and cholesterol are under control.  He did undergo physical exam today in the office and results as noted.

## 2024-09-03 NOTE — PROGRESS NOTES
Diabetic Foot Exam    Patient's shoes and socks removed.    Right Foot/Ankle   Right Foot Inspection  Skin Exam: skin normal and skin intact. No dry skin, no warmth, no callus, no erythema, no maceration, no abnormal color, no pre-ulcer, no ulcer and no callus.     Toe Exam: ROM and strength within normal limits. No swelling, no tenderness, erythema and  no right toe deformity    Sensory   Vibration: intact  Proprioception: intact  Monofilament testing: intact    Vascular  Capillary refills: < 3 seconds  The right PT pulse is 2+.     Left Foot/Ankle  Left Foot Inspection  Skin Exam: skin normal and skin intact. No dry skin, no warmth, no erythema, no maceration, normal color, no pre-ulcer, no ulcer and no callus.     Toe Exam: ROM and strength within normal limits. No swelling, no tenderness, no erythema and no left toe deformity.     Sensory   Vibration: intact  Proprioception: intact      Vascular  Capillary refills: < 3 seconds  The left DP pulse is 2+. The left PT pulse is 2+.     Assign Risk Category  No deformity present  No loss of protective sensation  No weak pulses  Risk: 0    Answers submitted by the patient for this visit:  AUDIT (Alcohol Use Disorders Identification Test) Screening (Submitted on 8/30/2024)  How often during the last year have you found that you were not able to stop drinking once you had started?: 0 - never  How often during the last year have you failed to do what was normally expected from you because of drinking?: 0 - never  How often during the last year have you needed a first drink in the morning to get yourself going after a heavy drinking session?: 0 - never  How often during the last year have you had a feeling of guilt or remorse after drinking?: 0 - never  How often during the last year have you been unable to remember what happened the night before because you had been drinking?: 0 - never  Have you or someone else been injured as a result of your drinking?: 0 - no  Has a  "relative or friend or a doctor or another health worker been concerned about your drinking or suggested you cut down?: 0 - no  Medicare Annual Wellness Visit (Submitted on 8/30/2024)  How would you rate your overall health?: excellent  Compared to last year, how is your physical health?: slightly better  In general, how satisfied are you with your life?: very satisfied  Compared to last year, how is your eyesight?: same  Compared to last year, how is your hearing?: same  Compared to last year, how is your emotional/mental health?: slightly better  How often is anger a problem for you?: never, rarely  How often do you feel unusually tired/fatigued?: never, rarely  In the past 7 days, how much pain have you experienced?: some  If you answered \"some\" or \"a lot\", please rate the severity of your pain on a scale of 1 to 10 (1 being the least severe pain and 10 being the most intense pain).: 2/10  In the past 6 months, have you lost or gained 10 pounds without trying?: Yes  One or more falls in the last year: No  Do you have trouble with the stairs inside or outside your home?: No  Does your home have working smoke alarms?: Yes  Does your home have a carbon monoxide monitor?: Yes  Which safety hazards (if any) have you experienced in your home? Please select all that apply.: none  How would you describe your current diet? Please select all that apply.: Regular  In addition to prescription medications, are you taking any over-the-counter supplements?: Yes  If yes, what supplements are you taking?: Vitamin D  Can you manage your medications?: Yes  Are you currently taking any opioid medications?: No  Can you walk and transfer into and out of your bed and chair?: Yes  Can you dress and groom yourself?: Yes  Can you bathe or shower yourself?: Yes  Can you feed yourself?: Yes  Can you do your laundry/ housekeeping?: Yes  Can you manage your money, pay your bills, and track your expenses?: Yes  Can you make your own meals?: " Yes  Can you do your own shopping?: Yes  Within the last 12 months, have you had any hospitalizations or Emergency Department visits?: No  Do you have a living will?: Yes  Do you have a Durable POA (Power of ) for healthcare decisions?: Yes  Do you have an Advanced Directive for end of life decisions?: Yes  How often have you used an illegal drug (including marijuana) or a prescription medication for non-medical reasons in the past year?: never  What is the typical number of drinks you consume in a day?: 3  What is the typical number of drinks you consume in a week?: 21  How often did you have a drink containing alcohol in the past year?: 4 or more times a week  How many drinks did you have on a typical day  when you were drinking in the past year?: 3 to 4    Conflicting answers have been found for some questions. Please document the patient's answers manually.  I have reviewed the patient's answers fully.  No specific problems

## 2024-09-03 NOTE — ASSESSMENT & PLAN NOTE
Patient does have some discomfort.  The insertion of the biceps tendon of his shoulder on the left.  Patient has no decreased strength and states that the discomfort is more irritation and it does not limit his activity level.  We have placed a consult for the patient to be seen by orthopedics.  Because this is a soft tissue injury we do not feel that x-rays would be beneficial.

## 2024-09-03 NOTE — ASSESSMENT & PLAN NOTE
History of hyperlipidemia.  Patient admits that he is not always perfect with his diet and he remains on atorvastatin 10 mg daily.  His cholesterol is controlled and patient will continue present medication and surveillance.  He admits that he is not always perfect with his intake of fats and cholesterol and we reinforced the importance of this besides his medication.

## 2024-09-03 NOTE — PROGRESS NOTES
Ambulatory Visit  Name: Jim Snow      : 1950      MRN: [id number]  Encounter Provider: Riky Menjivar DO  Encounter Date: 9/3/2024   Encounter department: [facility]    Assessment & Plan   1. Type 2 diabetes mellitus with hyperglycemia, without long-term current use of insulin (HCC)  Assessment & Plan:  Diabetes mellitus type 2.  He remains on medication as previously metformin 500 mg taking 1000 mg in the morning.  His hemoglobin A1c is stable at 6.0.  Again we reminded the patient the importance of watching his intake of concentrated sweets and simple carbohydrates..  We did perform a diabetic foot exam today in the office and we again discussed with the patient the importance of routine eye exams  Lab Results   Component Value Date    HGBA1C 6.0 (H) 2024     Orders:  -     Hemoglobin A1C; Future  2. Primary hypertension  Assessment & Plan:  Patient does have a longstanding history of hypertension.  Remains on medication as previously.  He denies any lightheadedness or dizziness and his kidney function is stable.  Orders:  -     Basic metabolic panel; Future  3. Familial hypercholesterolemia  Assessment & Plan:  History of hyperlipidemia.  Patient admits that he is not always perfect with his diet and he remains on atorvastatin 10 mg daily.  His cholesterol is controlled and patient will continue present medication and surveillance.  He admits that he is not always perfect with his intake of fats and cholesterol and we reinforced the importance of this besides his medication.  4. Medicare annual wellness visit, subsequent  Assessment & Plan:  Patient is a 73-year-old male history of diabetes mellitus type 2, hyperlipidemia, hypertension, DJD who is here today for repeat Medicare wellness visit.  He did have labs performed prior to the visit today we did discuss results at length with the patient.  Patient relates in general doing well.  Does have some discomfort  to the area of the biceps tendon to his left shoulder for the past few weeks.  No specific accident or injury.  He is compliant with medication and his sugar and blood pressure and cholesterol are under control.  He did undergo physical exam today in the office and results as noted.  5. Impingement syndrome of left shoulder  Assessment & Plan:  Patient does have some discomfort.  The insertion of the biceps tendon of his shoulder on the left.  Patient has no decreased strength and states that the discomfort is more irritation and it does not limit his activity level.  We have placed a consult for the patient to be seen by orthopedics.  Because this is a soft tissue injury we do not feel that x-rays would be beneficial.  6. Overweight  Assessment & Plan:  With the patient's BMI and he is concerned states reduce his calories in order to lose weight.  He is physically active but has no routine exercise program which we feel would be important also.  He is spending time golfing with his wife         History of Present Illness     73-year-old male history of medical problems outlined previously who is here today for a repeat Medicare wellness visit.  Patient did have extensive lab testing performed prior to the visit today we did discuss the results at length with the patient.  Patient relates that he is doing well and is enjoying his summer is out golfing with his wife a few times per week.  Only complaint is some discomfort in his left shoulder.  It does not interfere with his activity level but is more irritation      Review of Systems   Constitutional: Negative.    HENT: Negative.     Eyes: Negative.    Respiratory: Negative.     Cardiovascular: Negative.    Gastrointestinal: Negative.    Endocrine: Negative.    Genitourinary: Negative.    Musculoskeletal:  Positive for arthralgias. Negative for back pain, gait problem, joint swelling, myalgias, neck pain and neck stiffness.   Skin: Negative.    Allergic/Immunologic:  Negative.    Neurological: Negative.    Hematological: Negative.    Psychiatric/Behavioral: Negative.       Past Medical History:   Diagnosis Date    Allergic Bees    Diabetes (HCC)     Diabetes mellitus (HCC)     HL (hearing loss) 11/11/2020    Hyperlipidemia     Hypertension     Visual impairment     floater in eye     Past Surgical History:   Procedure Laterality Date    APPENDECTOMY       Family History   Problem Relation Age of Onset    Parkinsonism Mother     Arthritis Mother     Hearing loss Mother         at age 91    Dementia Mother     Diabetes Father     Pancreatic cancer Father     Colon cancer Father     Asthma Father     Cancer Father         colon cancer    Dementia Father     Hypertension Family     Hearing loss Family      Social History     Tobacco Use    Smoking status: Former     Types: Cigars    Smokeless tobacco: Never   Vaping Use    Vaping status: Never Used   Substance and Sexual Activity    Alcohol use: Yes     Alcohol/week: 7.0 standard drinks of alcohol     Types: 7 Glasses of wine per week     Comment: wine    Drug use: Never    Sexual activity: Not Currently     Partners: Female     Birth control/protection: None     Current Outpatient Medications on File Prior to Visit   Medication Sig    albuterol (PROVENTIL HFA,VENTOLIN HFA) 90 mcg/act inhaler Inhale 2 puffs every 6 (six) hours as needed for wheezing or shortness of breath    atorvastatin (LIPITOR) 10 mg tablet Take 1 tablet (10 mg total) by mouth daily    Blood Glucose Monitoring Suppl (ONE TOUCH ULTRA 2) w/Device KIT Check blood sugars once daily. Please substitute with appropriate alternative as covered by patient's insurance. Dx: E11.65.  NPI [id number]    clotrimazole-betamethasone (LOTRISONE) 1-0.05 % cream Apply topically 2 (two) times a day    EPINEPHrine (EPIPEN) 0.3 mg/0.3 mL SOAJ Inject 0.3 mL (0.3 mg total) into a muscle once for 1 dose    ergocalciferol (VITAMIN D2) 50,000 units Take by mouth    fluticasone (FLONASE) 50  "mcg/act nasal spray 1 spray into each nostril daily    glucose blood (OneTouch Ultra) test strip Test blood sugars daily    Lancets (onetouch ultrasoft) lancets Check blood sugars once daily. Please substitute with appropriate alternative as covered by patient's insurance. Dx: E11.65.  NPI [id number]    losartan (COZAAR) 50 mg tablet Take 1 tablet (50 mg total) by mouth daily    metFORMIN (GLUCOPHAGE-XR) 500 mg 24 hr tablet Take 1 tablet (500 mg total) by mouth 2 (two) times a day with meals (Patient taking differently: Take 500 mg by mouth daily with breakfast)    Blood Glucose Monitoring Suppl (OneTouch Verio Reflect) w/Device KIT Check blood sugars once daily. Please substitute with appropriate alternative as covered by patient's insurance. Dx: E11.65     Allergies   Allergen Reactions    Bee Venom Syncope     Immunization History   Administered Date(s) Administered    COVID-19 MODERNA VACC 0.5 ML IM 02/19/2021, 03/19/2021, 10/30/2021, 04/19/2022    COVID-19 Moderna Vac BIVALENT 12 Yr+ IM 0.5 ML 10/03/2022, 05/22/2023    COVID-19 Moderna mRNA Vaccine 12 Yr+ 50 mcg/0.5 mL (Spikevax) 09/28/2023    INFLUENZA 10/01/2015, 09/26/2016, 09/29/2017, 09/26/2018, 11/01/2018, 10/03/2022, 11/01/2023    Influenza, high dose seasonal 0.7 mL 09/23/2020, 12/09/2021    Pneumococcal Conjugate 13-Valent 12/09/2021    Pneumococcal Conjugate Vaccine 20-valent (Pcv20), Polysace 05/16/2023    Respiratory Syncytial Virus Vaccine (Recombinant, Adjuvanted) 11/08/2023     Objective     /68   Pulse 74   Resp 18   Ht 5' 7\" (1.702 m)   Wt 80.7 kg (178 lb)   SpO2 96%   BMI 27.88 kg/m²     Physical Exam  Vitals and nursing note reviewed.   Constitutional:       General: He is not in acute distress.     Appearance: Normal appearance. He is not ill-appearing, toxic-appearing or diaphoretic.      Comments: Pleasant articulate 73-year-old male who is awake alert in no acute distress oriented x 3, mildly overweight   HENT:      Head: " Normocephalic and atraumatic.      Right Ear: Tympanic membrane, ear canal and external ear normal. There is no impacted cerumen.      Left Ear: Tympanic membrane, ear canal and external ear normal. There is no impacted cerumen.      Nose: Nose normal. No congestion or rhinorrhea.      Mouth/Throat:      Mouth: Mucous membranes are moist.      Pharynx: Oropharynx is clear. No oropharyngeal exudate or posterior oropharyngeal erythema.   Eyes:      General: No scleral icterus.        Right eye: No discharge.         Left eye: No discharge.      Extraocular Movements: Extraocular movements intact.      Conjunctiva/sclera: Conjunctivae normal.      Pupils: Pupils are equal, round, and reactive to light.   Neck:      Vascular: No carotid bruit.   Cardiovascular:      Rate and Rhythm: Normal rate and regular rhythm.      Pulses: Normal pulses.      Heart sounds: Normal heart sounds. No murmur heard.     No friction rub. No gallop.   Pulmonary:      Effort: Pulmonary effort is normal. No respiratory distress.      Breath sounds: Normal breath sounds. No stridor. No wheezing, rhonchi or rales.   Chest:      Chest wall: No tenderness.   Abdominal:      General: Abdomen is flat. Bowel sounds are normal. There is no distension.      Palpations: Abdomen is soft. There is no mass.      Tenderness: There is no abdominal tenderness. There is no right CVA tenderness, left CVA tenderness, guarding or rebound.      Hernia: No hernia is present.   Genitourinary:     Penis: Normal.       Testes: Normal.      Prostate: Normal.      Rectum: Normal.   Musculoskeletal:         General: No swelling, tenderness or deformity. Normal range of motion.      Cervical back: Normal range of motion and neck supple. No rigidity or tenderness.      Right lower leg: No edema.   Lymphadenopathy:      Cervical: No cervical adenopathy.   Skin:     General: Skin is warm and dry.      Capillary Refill: Capillary refill takes less than 2 seconds.       Coloration: Skin is not jaundiced or pale.      Findings: No bruising, erythema, lesion or rash.   Neurological:      General: No focal deficit present.      Mental Status: He is alert and oriented to person, place, and time. Mental status is at baseline.      Cranial Nerves: No cranial nerve deficit.      Sensory: No sensory deficit.      Motor: No weakness.      Coordination: Coordination normal.      Gait: Gait normal.      Deep Tendon Reflexes: Reflexes normal.   Psychiatric:         Mood and Affect: Mood normal.         Behavior: Behavior normal.         Thought Content: Thought content normal.         Judgment: Judgment normal.         Answers submitted by the patient for this visit:  AUDIT (Alcohol Use Disorders Identification Test) Screening (Submitted on 8/30/2024)  How often during the last year have you found that you were not able to stop drinking once you had started?: 0 - never  How often during the last year have you failed to do what was normally expected from you because of drinking?: 0 - never  How often during the last year have you needed a first drink in the morning to get yourself going after a heavy drinking session?: 0 - never  How often during the last year have you had a feeling of guilt or remorse after drinking?: 0 - never  How often during the last year have you been unable to remember what happened the night before because you had been drinking?: 0 - never  Have you or someone else been injured as a result of your drinking?: 0 - no  Has a relative or friend or a doctor or another health worker been concerned about your drinking or suggested you cut down?: 0 - no  Medicare Annual Wellness Visit (Submitted on 8/30/2024)  How would you rate your overall health?: excellent  Compared to last year, how is your physical health?: slightly better  In general, how satisfied are you with your life?: very satisfied  Compared to last year, how is your eyesight?: same  Compared to last year, how is  "your hearing?: same  Compared to last year, how is your emotional/mental health?: slightly better  How often is anger a problem for you?: never, rarely  How often do you feel unusually tired/fatigued?: never, rarely  In the past 7 days, how much pain have you experienced?: some  If you answered \"some\" or \"a lot\", please rate the severity of your pain on a scale of 1 to 10 (1 being the least severe pain and 10 being the most intense pain).: 2/10  In the past 6 months, have you lost or gained 10 pounds without trying?: Yes  One or more falls in the last year: No  Do you have trouble with the stairs inside or outside your home?: No  Does your home have working smoke alarms?: Yes  Does your home have a carbon monoxide monitor?: Yes  Which safety hazards (if any) have you experienced in your home? Please select all that apply.: none  How would you describe your current diet? Please select all that apply.: Regular  In addition to prescription medications, are you taking any over-the-counter supplements?: Yes  If yes, what supplements are you taking?: Vitamin D  Can you manage your medications?: Yes  Are you currently taking any opioid medications?: No  Can you walk and transfer into and out of your bed and chair?: Yes  Can you dress and groom yourself?: Yes  Can you bathe or shower yourself?: Yes  Can you feed yourself?: Yes  Can you do your laundry/ housekeeping?: Yes  Can you manage your money, pay your bills, and track your expenses?: Yes  Can you make your own meals?: Yes  Can you do your own shopping?: Yes  Within the last 12 months, have you had any hospitalizations or Emergency Department visits?: No  Do you have a living will?: Yes  Do you have a Durable POA (Power of ) for healthcare decisions?: Yes  Do you have an Advanced Directive for end of life decisions?: Yes  How often have you used an illegal drug (including marijuana) or a prescription medication for non-medical reasons in the past year?: " never  What is the typical number of drinks you consume in a day?: 3  What is the typical number of drinks you consume in a week?: 21  How often did you have a drink containing alcohol in the past year?: 4 or more times a week  How many drinks did you have on a typical day  when you were drinking in the past year?: 3 to 4    Conflicting answers have been found for some questions. Please document the patient's answers manually.  We did review the patient's answers and there are no major contraindications that we need to address at this point in time.

## 2024-09-03 NOTE — ASSESSMENT & PLAN NOTE
Diabetes mellitus type 2.  He remains on medication as previously metformin 500 mg taking 1000 mg in the morning.  His hemoglobin A1c is stable at 6.0.  Again we reminded the patient the importance of watching his intake of concentrated sweets and simple carbohydrates..  We did perform a diabetic foot exam today in the office and we again discussed with the patient the importance of routine eye exams  Lab Results   Component Value Date    HGBA1C 6.0 (H) 08/30/2024

## 2024-09-05 ENCOUNTER — OFFICE VISIT (OUTPATIENT)
Dept: OBGYN CLINIC | Facility: CLINIC | Age: 74
End: 2024-09-05
Payer: MEDICARE

## 2024-09-05 ENCOUNTER — EVALUATION (OUTPATIENT)
Dept: PHYSICAL THERAPY | Facility: REHABILITATION | Age: 74
End: 2024-09-05
Payer: MEDICARE

## 2024-09-05 ENCOUNTER — APPOINTMENT (OUTPATIENT)
Dept: RADIOLOGY | Facility: AMBULARY SURGERY CENTER | Age: 74
End: 2024-09-05
Attending: ORTHOPAEDIC SURGERY
Payer: MEDICARE

## 2024-09-05 VITALS — BODY MASS INDEX: 27.94 KG/M2 | WEIGHT: 178 LBS | HEIGHT: 67 IN

## 2024-09-05 DIAGNOSIS — M75.42 IMPINGEMENT SYNDROME OF LEFT SHOULDER: ICD-10-CM

## 2024-09-05 DIAGNOSIS — M75.42 IMPINGEMENT SYNDROME OF LEFT SHOULDER: Primary | ICD-10-CM

## 2024-09-05 DIAGNOSIS — M75.22 BICEPS TENDONITIS ON LEFT: Primary | ICD-10-CM

## 2024-09-05 DIAGNOSIS — M75.22 BICEPS TENDONITIS ON LEFT: ICD-10-CM

## 2024-09-05 PROCEDURE — 97161 PT EVAL LOW COMPLEX 20 MIN: CPT

## 2024-09-05 PROCEDURE — 20610 DRAIN/INJ JOINT/BURSA W/O US: CPT | Performed by: ORTHOPAEDIC SURGERY

## 2024-09-05 PROCEDURE — 97110 THERAPEUTIC EXERCISES: CPT

## 2024-09-05 PROCEDURE — 99214 OFFICE O/P EST MOD 30 MIN: CPT | Performed by: ORTHOPAEDIC SURGERY

## 2024-09-05 PROCEDURE — 73030 X-RAY EXAM OF SHOULDER: CPT

## 2024-09-05 RX ORDER — BUPIVACAINE HYDROCHLORIDE 2.5 MG/ML
2 INJECTION, SOLUTION INFILTRATION; PERINEURAL
Status: COMPLETED | OUTPATIENT
Start: 2024-09-05 | End: 2024-09-05

## 2024-09-05 RX ORDER — BETAMETHASONE SODIUM PHOSPHATE AND BETAMETHASONE ACETATE 3; 3 MG/ML; MG/ML
6 INJECTION, SUSPENSION INTRA-ARTICULAR; INTRALESIONAL; INTRAMUSCULAR; SOFT TISSUE
Status: COMPLETED | OUTPATIENT
Start: 2024-09-05 | End: 2024-09-05

## 2024-09-05 RX ADMIN — BUPIVACAINE HYDROCHLORIDE 2 ML: 2.5 INJECTION, SOLUTION INFILTRATION; PERINEURAL at 10:15

## 2024-09-05 RX ADMIN — BETAMETHASONE SODIUM PHOSPHATE AND BETAMETHASONE ACETATE 6 MG: 3; 3 INJECTION, SUSPENSION INTRA-ARTICULAR; INTRALESIONAL; INTRAMUSCULAR; SOFT TISSUE at 10:15

## 2024-09-05 NOTE — PROGRESS NOTES
Assessment:  1. Impingement syndrome of left shoulder  Ambulatory Referral to Orthopedic Surgery    XR shoulder 2+ vw left    Large joint arthrocentesis      2. Biceps tendonitis on left  Large joint arthrocentesis        Patient Active Problem List   Diagnosis    Hyperlipidemia    Hypertension    Type 2 diabetes mellitus with hyperglycemia (HCC)    Vitamin D deficiency    Medicare annual wellness visit, subsequent    Overweight    Bronchitis    Occult blood in stools    Left chronic serous otitis media    Anaphylaxis    Oral thrush    Impingement syndrome of left shoulder    Dermatitis    Acute pain of left shoulder    Biceps tendonitis on left           Plan      Cortisone injection into the biceps tendon sheath  Anti-inflammatories as necessary  Referral to physical therapy to work on shoulder flexibility and stability.  Follow-up on as-needed basis.            Subjective:     Patient ID:    Chief Complaint:Jim Snow 73 y.o. male      HPI    Patient comes in today with regards to left shoulder.  The patient reports that the pain is in the anterior lateral aspect of the shoulder and has been going on for 1 month.  The pain is rated at0 at its best and 5 at its worst.  The pain is described as muscular.  It is worsened with uncertain, and is made better with Arnica.  The patient has taken ibuprofen for treatment.  Has not tried ice or heat specifically but has done warm showers which does help.  When he is playing golf by time he is done it actually feels better.  Patient is seen the shoulder specialist in the past and reports that this is different from what he had before.      The following portions of the patient's history were reviewed and updated as appropriate: allergies, current medications, past family history, past social history, past surgical history and problem list.        Social History     Socioeconomic History    Marital status: /Civil Union     Spouse name: Not on file    Number of  children: Not on file    Years of education: Not on file    Highest education level: Not on file   Occupational History    Not on file   Tobacco Use    Smoking status: Former     Types: Cigars    Smokeless tobacco: Never   Vaping Use    Vaping status: Never Used   Substance and Sexual Activity    Alcohol use: Yes     Alcohol/week: 7.0 standard drinks of alcohol     Types: 7 Glasses of wine per week     Comment: wine    Drug use: Never    Sexual activity: Not Currently     Partners: Female     Birth control/protection: None   Other Topics Concern    Not on file   Social History Narrative    Not on file     Social Determinants of Health     Financial Resource Strain: Low Risk  (8/27/2023)    Overall Financial Resource Strain (CARDIA)     Difficulty of Paying Living Expenses: Not hard at all   Food Insecurity: No Food Insecurity (9/3/2024)    Hunger Vital Sign     Worried About Running Out of Food in the Last Year: Never true     Ran Out of Food in the Last Year: Never true   Transportation Needs: No Transportation Needs (9/3/2024)    PRAPARE - Transportation     Lack of Transportation (Medical): No     Lack of Transportation (Non-Medical): No   Physical Activity: Not on file   Stress: Not on file   Social Connections: Unknown (6/18/2024)    Received from [employer]    Social Cloud Floor     How often do you feel lonely or isolated from those around you? (Adult - for ages 18 years and over): Not on file   Intimate Partner Violence: Not on file   Housing Stability: Low Risk  (9/3/2024)    Housing Stability Vital Sign     Unable to Pay for Housing in the Last Year: No     Number of Times Moved in the Last Year: 0     Homeless in the Last Year: No     Past Medical History:   Diagnosis Date    Allergic Bees    Diabetes (HCC)     Diabetes mellitus (HCC)     HL (hearing loss) 11/11/2020    Hyperlipidemia     Hypertension     Visual impairment     floater in eye     Past Surgical History:   Procedure Laterality Date     APPENDECTOMY       Allergies   Allergen Reactions    Bee Venom Syncope     Current Outpatient Medications on File Prior to Visit   Medication Sig Dispense Refill    albuterol (PROVENTIL HFA,VENTOLIN HFA) 90 mcg/act inhaler Inhale 2 puffs every 6 (six) hours as needed for wheezing or shortness of breath 1 Inhaler 5    atorvastatin (LIPITOR) 10 mg tablet Take 1 tablet (10 mg total) by mouth daily 100 tablet 1    Blood Glucose Monitoring Suppl (ONE TOUCH ULTRA 2) w/Device KIT Check blood sugars once daily. Please substitute with appropriate alternative as covered by patient's insurance. Dx: E11.65.  NPI [id number] 1 kit 0    Blood Glucose Monitoring Suppl (OneTouch Verio Reflect) w/Device KIT Check blood sugars once daily. Please substitute with appropriate alternative as covered by patient's insurance. Dx: E11.65 1 kit 0    clotrimazole-betamethasone (LOTRISONE) 1-0.05 % cream Apply topically 2 (two) times a day 30 g 4    EPINEPHrine (EPIPEN) 0.3 mg/0.3 mL SOAJ Inject 0.3 mL (0.3 mg total) into a muscle once for 1 dose 0.6 mL 3    ergocalciferol (VITAMIN D2) 50,000 units Take by mouth      fluticasone (FLONASE) 50 mcg/act nasal spray 1 spray into each nostril daily      glucose blood (OneTouch Ultra) test strip Test blood sugars daily 100 each 0    Lancets (onetouch ultrasoft) lancets Check blood sugars once daily. Please substitute with appropriate alternative as covered by patient's insurance. Dx: E11.65.  NPI [id number] 100 each 0    losartan (COZAAR) 50 mg tablet Take 1 tablet (50 mg total) by mouth daily 100 tablet 1    metFORMIN (GLUCOPHAGE-XR) 500 mg 24 hr tablet Take 1 tablet (500 mg total) by mouth 2 (two) times a day with meals (Patient taking differently: Take 500 mg by mouth daily with breakfast) 200 tablet 1     No current facility-administered medications on file prior to visit.              Objective:    Review of Systems   Constitutional: Negative.    HENT: Negative.     Eyes: Negative.    Respiratory:  Negative.     Cardiovascular: Negative.    Gastrointestinal: Negative.  Negative for vomiting.   Genitourinary: Negative.    Musculoskeletal:         Please refer to HPI   Skin: Negative.    Neurological: Negative.    Hematological: Negative.    Psychiatric/Behavioral: Negative.     All other systems reviewed and are negative.      Right Shoulder Exam     Range of Motion   The patient has normal right shoulder ROM.  Right shoulder external rotation: T5.   Right shoulder internal rotation 0 degrees: T9.       Left Shoulder Exam     Range of Motion   Active abduction:  150   Left shoulder external rotation: T4.   Forward flexion:  150   Left shoulder internal rotation 0 degrees: T9.     Muscle Strength   The patient has normal left shoulder strength.    Tests   Chinchilla test: negative  Impingement: positive    Other   Sensation: normal  Pulse: present     Comments:  Brentford's test with arm in supination cause pain speeds test and Neer's test were both positive he was also tender to palpation over the biceps tendon.            Physical Exam  Vitals and nursing note reviewed.   Constitutional:       Appearance: He is well-developed.   HENT:      Head: Normocephalic.   Eyes:      Pupils: Pupils are equal, round, and reactive to light.   Pulmonary:      Effort: Pulmonary effort is normal.   Abdominal:      General: There is no distension.   Musculoskeletal:      Cervical back: Neck supple.   Skin:     General: Skin is warm.   Neurological:      Mental Status: He is alert and oriented to person, place, and time.         Large joint arthrocentesis  Universal Protocol:  Consent given by: patient  Supporting Documentation  Indications: pain   Procedure Details  Location: shoulder -   Needle size: 25 G  Approach: anterior  Medications administered: 2 mL bupivacaine 0.25 %; 6 mg betamethasone acetate-betamethasone sodium phosphate 6 (3-3) mg/mL    Patient tolerance: patient tolerated the procedure well with no immediate  "complications             I have personally reviewed pertinent films in PACS.  Minimal arthritis      Portions of the record may have been created with voice recognition software.  Occasional wrong word or \"sound a like\" substitutions may have occurred due to the inherent limitations of voice recognition software.  Read the chart carefully and recognize, using context, where substitutions have occurred.  "

## 2024-09-05 NOTE — PROGRESS NOTES
PT Evaluation     Today's date: 2024  Patient name: Jim Snow  : 1950  MRN: [id number]  Referring provider: Jim Diaz DO  Dx:   Encounter Diagnosis     ICD-10-CM    1. Impingement syndrome of left shoulder  M75.42 Ambulatory Referral to Physical Therapy      2. Biceps tendonitis on left  M75.22 Ambulatory Referral to Physical Therapy          Start Time: 1430  Stop Time: 1515  Total time in clinic (min): 45 minutes    Assessment  Impairments: abnormal coordination, abnormal muscle firing, abnormal or restricted ROM, activity intolerance, impaired balance, impaired physical strength, lacks appropriate home exercise program, pain with function, weight-bearing intolerance, poor body mechanics, unable to perform ADL, participation limitations, activity limitations and endurance    Assessment details: Jim Snow is a 73 y.o. male presenting with primary diagnosis of left shoulder impingement syndrome and secondary biceps tendonitis. Primary impairments include limited ROM d/t pain, reduced strength and pain with reaching tasks. Will benefit from skilled PT interventions for the previously mentioned impairments. HEP was provided and reviewed. Patient is able to complete HEP with good technique and appropriate pain response. Patient expressed understanding of appropriate dosage and frequency of HEP and was instructed to discontinue exercises if symptoms are exacerbated. No additional referral necessary. 1:1 with Cristina Ramos, PT, DPT for entirety of session.     Understanding of Dx/Px/POC: good     Prognosis: good    Goals    Short Term Goals:  In 4 weeks, the patient will:  1. Worst pain will decrease by 1 point   2. Increase ER strength by half a point   3. Supervision with HEP for self care    Long Term Goals:  In 12 weeks, the patient will:  1. Decrease symptom intensity to no greater then 2/10 with activity  2. FOTO to greater than predicted value  3. Independent with HEP for  selfcare        Plan  Patient would benefit from: skilled physical therapy and PT eval  Planned modality interventions: biofeedback, electrical stimulation/Russian stimulation, TENS, neuromuscular electric stimulation and unattended electrical stimulation    Planned therapy interventions: abdominal trunk stabilization, activity modification, ADL retraining, ADL training, balance, balance/weight bearing training, behavior modification, body mechanics training, functional ROM exercises, flexibility, fine motor coordination training, gait training, graded activity, graded exercise, graded motor, home exercise program, therapeutic training, therapeutic exercise, therapeutic activities, postural training, self care, strengthening, stretching, patient/caregiver education, motor coordination training, muscle pump exercises, nerve gliding, neuromuscular re-education, manual therapy, joint mobilization and kinesiology taping    Frequency: 2x week  Duration in weeks: 12  Plan of Care beginning date: 9/5/2024  Plan of Care expiration date: 11/28/2024  Treatment plan discussed with: patient        Subjective  Diagnosis: biceps tendonitis, left impingement syndrome of shoulder  JENY: patient has been golfing more now with his wife for more exercise  DOI: about 1 month ago shoulder began to hurt, and since then his shoulder has began to lose some range of motion  Limitations: no limitations, sometimes he may wake during sleep if he lays on arm incorrectly  Aggravating factors: working over head, increased activity levels  Easing factors:  PSHx: n/a  Pain: Best-  0/10, Worst-  2/10, Current-  0/10  PLOF: fully independent   Goals: relieve pain and optimally management     Objective  OBJECTIVE:  Red Flags- pt denies numbness and tingling    Standing         Head Position x Protracted  Neutral  Retracted   Scapular Position x Protracted  Neutral  Retracted   Thoracic Spine  Inc Kyphosis x Neutral     Lumbar Spine  Inc Lordosis x  "Neutral  Dec Lordosis   Pelvis  Anterior Tilt x Neutral  Posterior Tilt   Iliac Crest  L elevated x Neutral  R elevated   Scoliotic Curvature  \"C\" Curve  \"S\" Curve     Lateral Shift  Right  Left x None     Strength and ROM evaluated B from a regional biomechanical perspective and values relevant to this episode recorded in table below    ROM: Goniometric measurement revealed the following findings.  Shoulder ROM Right Left   Flexion 100% 90% !   Abduction 100% 75% !   ER Wnl  Wnl    IR Wnl  Wnl           Strength: MMT revealed the following findings.  Joint Motion Right Left   Sh. Flexion 5/5 5/5   Sh. Abduction 5/5 4/5   Sh. ER 5/5 5/5 !   Sh. IR 5/5 5/5   Shoulder extension 5/5 5/5   Shoulder horizontal ABD 5/5 5/5          Additional Assessments:  Palpation: no ttp   Joint mobility: normal through out     Shoulder Specific Special Tests:                                                                                                                                                                      Test / Measure  Right 9/5/2024 Left 9/5/2024   Jeny - +   Painful Arc - +   Infraspinatus Strength Test - +   Drop Arm - -   Supraspinatus (empty can) - -   Neer - -   Sulcus  - -   Biceps Load II - -     POC expires Unit limit Auth Expiration date PT/OT + Visit Limit?   11/28/2024 BOMN N/a BOMN         Visit/Unit Tracking  AUTH Status:  Date 9/5        N/a Used 1         Remaining             Pertinent Findings:      POC End Date: 11/28/2024                                                                                          Test / Measure  9/5   FOTO (Predicted 74) 74   Left shoulder F  90% !   Left shoulder Abd 75%    Left shoudler Abd Strength  4/5              Precautions: n/a    Access Code: [email or web address]  URL: [email or web address]  Date: 09/05/2024  Prepared by: Cristina Hyman  - Seated Shoulder Flexion Slide at Table Top with Forearm in Neutral  - 2 x daily - 10 reps - 10 " hold  - Supine Shoulder External Rotation in 45 Degrees Abduction AAROM with Dowel  - 2 x daily - 20 reps - 5 hold  - Shoulder External Rotation and Scapular Retraction with Resistance  - 2 x daily - 2 sets - 10 reps - 5 hold  - Standing Shoulder Flexion with Resistance  - 2 x daily - 2 sets - 10 reps  - Standing Single Arm Shoulder Abduction with Resistance  - 2 x daily - 2 sets - 10 reps  - Standing Single Arm Elbow Flexion with Resistance  - 2 x daily - 2 sets - 10 reps    Manuals 9/5            Post GHJ                                                    Neuro Re-Ed             [place] ER/IR             Supine Punch              Rhythmic Stabilization                                                                  Ther Ex             Wall Walks              Unilateral Rows              Unilateral Pull Down              Shoulder 3 Way              TB ER/IR             Cable curl with shoulder Ext                                       Ther Activity             UBE             Pulleys              Gait Training                                       Modalities

## 2024-09-09 ENCOUNTER — OFFICE VISIT (OUTPATIENT)
Dept: PHYSICAL THERAPY | Facility: REHABILITATION | Age: 74
End: 2024-09-09
Payer: MEDICARE

## 2024-09-09 DIAGNOSIS — M75.22 BICEPS TENDONITIS ON LEFT: ICD-10-CM

## 2024-09-09 DIAGNOSIS — M75.42 IMPINGEMENT SYNDROME OF LEFT SHOULDER: Primary | ICD-10-CM

## 2024-09-09 PROCEDURE — 97110 THERAPEUTIC EXERCISES: CPT

## 2024-09-09 PROCEDURE — 97112 NEUROMUSCULAR REEDUCATION: CPT

## 2024-09-09 NOTE — PROGRESS NOTES
Daily Note     Today's date: 2024  Patient name: Jim Snow  : 1950  MRN: [id number]  Referring provider: Jim Diaz DO  Dx:   Encounter Diagnosis     ICD-10-CM    1. Impingement syndrome of left shoulder  M75.42       2. Biceps tendonitis on left  M75.22           Start Time: 1130  Stop Time: 1210  Total time in clinic (min): 40 minutes    Subjective: Patient notes pain has decreased to 1/10 since initial evaluation.       Objective: See treatment diary below      Assessment: Additional strengthening was initiated this session and patient was able to complete all exercises with no increase in pain. Minimal cuing needed and patient was able to self adjust during session to correct technique. Tolerated treatment well. Patient demonstrated fatigue post treatment, exhibited good technique with therapeutic exercises, and would benefit from continued PT      Plan: Continue per plan of care.      Precautions: n/a    POC expires Unit limit Auth Expiration date PT/OT + Visit Limit?   2024 BOMN N/a BOMN         Visit/Unit Tracking  AUTH Status:  Date        N/a Used 1 2        Remaining             Pertinent Findings:      POC End Date: 2024                                                                                          Test / Measure     FOTO (Predicted 74) 74   Left shoulder F  90% !   Left shoulder Abd 75%    Left shoudler Abd Strength       Access Code: [email or web address]  URL: [email or web address]  Date: 2024  Prepared by: Cristina Ramos    Exercises  - Seated Shoulder Flexion Slide at Table Top with Forearm in Neutral  - 2 x daily - 10 reps - 10 hold  - Supine Shoulder External Rotation in 45 Degrees Abduction AAROM with Dowel  - 2 x daily - 20 reps - 5 hold  - Shoulder External Rotation and Scapular Retraction with Resistance  - 2 x daily - 2 sets - 10 reps - 5 hold  - Standing Shoulder Flexion with Resistance  - 2 x daily - 2 sets - 10 reps  - Standing  "Single Arm Shoulder Abduction with Resistance  - 2 x daily - 2 sets - 10 reps  - Standing Single Arm Elbow Flexion with Resistance  - 2 x daily - 2 sets - 10 reps    Manuals 9/5 9/9           Post GHJ  TT                                                  Neuro Re-Ed             [place] ER/IR  2x30\"            Supine Punch   2x10 5#            Rhythmic Stabilization                                                                  Ther Ex             Wall Walks              Unilateral Rows   2x10 16#           Pull Down              Shoulder 3 Way              TB ER/IR  Btb 2x10 ER; 2 Btb IR             Cable curl with shoulder Ext  2x10 10#           Foam Roll Press w/ ER  2x10 ytb                        Ther Activity             UBE  3'/3'           Pulleys   30x            Gait Training                                       Modalities                                            "

## 2024-09-12 ENCOUNTER — APPOINTMENT (OUTPATIENT)
Dept: PHYSICAL THERAPY | Facility: REHABILITATION | Age: 74
End: 2024-09-12
Payer: MEDICARE

## 2024-09-13 ENCOUNTER — OFFICE VISIT (OUTPATIENT)
Dept: PHYSICAL THERAPY | Facility: REHABILITATION | Age: 74
End: 2024-09-13
Payer: MEDICARE

## 2024-09-13 DIAGNOSIS — M75.22 BICEPS TENDONITIS ON LEFT: ICD-10-CM

## 2024-09-13 DIAGNOSIS — M75.42 IMPINGEMENT SYNDROME OF LEFT SHOULDER: Primary | ICD-10-CM

## 2024-09-13 PROCEDURE — 97110 THERAPEUTIC EXERCISES: CPT

## 2024-09-13 PROCEDURE — 97112 NEUROMUSCULAR REEDUCATION: CPT

## 2024-09-13 NOTE — PROGRESS NOTES
Daily Note     Today's date: 2024  Patient name: Jim Snow  : 1950  MRN: [id number]  Referring provider: Jim Diaz DO  Dx:   Encounter Diagnosis     ICD-10-CM    1. Impingement syndrome of left shoulder  M75.42       2. Biceps tendonitis on left  M75.22           Start Time: 930  Stop Time: 1015  Total time in clinic (min): 45 minutes    Subjective: Bandar has no new complaints today. Reports feeling good after last PT session and has no L shoulder pain currently.       Objective: See treatment diary below      Assessment: Bandar Tolerated treatment well with appropriate muscle fatigue experienced with ex's. He is making steady gains towards goals and remains appropriately challenged with his program. Tolerated increased resistance and reps with ex's well today w/o any reported L shoulder pain. Required vc's t/o session for proper positioning with ex's.    Bandar would benefit from continued PT and compliance with HEP.    1:1 with Lilli Sharma PTA  for entirety of treatment session.       Plan: Continue per plan of care.      Precautions: n/a    POC expires Unit limit Auth Expiration date PT/OT + Visit Limit?   2024 BOMN N/a BOMN         Visit/Unit Tracking  AUTH Status:  Date       N/a Used 1 2 3       Remaining             Pertinent Findings:      POC End Date: 2024                                                                                          Test / Measure     FOTO (Predicted 74) 74   Left shoulder F  90% !   Left shoulder Abd 75%    Left shoudler Abd Strength       Access Code: [email or web address]  URL: [email or web address]  Date: 2024  Prepared by: Cristina Trach    Exercises  - Seated Shoulder Flexion Slide at Table Top with Forearm in Neutral  - 2 x daily - 10 reps - 10 hold  - Supine Shoulder External Rotation in 45 Degrees Abduction AAROM with Dowel  - 2 x daily - 20 reps - 5 hold  - Shoulder External Rotation and Scapular Retraction with  "Resistance  - 2 x daily - 2 sets - 10 reps - 5 hold  - Standing Shoulder Flexion with Resistance  - 2 x daily - 2 sets - 10 reps  - Standing Single Arm Shoulder Abduction with Resistance  - 2 x daily - 2 sets - 10 reps  - Standing Single Arm Elbow Flexion with Resistance  - 2 x daily - 2 sets - 10 reps    Manuals 9/5 9/9 9/13          Post GHJ  TT AW                                                  Neuro Re-Ed             [place] ER/IR  2x30\"  30\"x2 ea           Supine Punch   2x10 5#  5# 2x10           Rhythmic Stabilization    1 min x2                                                               Ther Ex             Wall Walks              Unilateral Rows   2x10 16# 16# 3x10           Pull Down              Shoulder 3 Way              TB ER/IR  Btb 2x10 ER; 2 Btb IR   IR 4#   ER 4#  2x10 ea           Cable curl with shoulder Ext  2x10 10# 10# 2x10           Foam Roll Press w/ ER  2x10 ytb OTB 2x10                        Ther Activity             UBE  3'/3' 3'/3'          Pulleys   30x  5\"x30           Gait Training                                       Modalities                                              "

## 2024-09-16 ENCOUNTER — OFFICE VISIT (OUTPATIENT)
Dept: PHYSICAL THERAPY | Facility: REHABILITATION | Age: 74
End: 2024-09-16
Payer: MEDICARE

## 2024-09-16 DIAGNOSIS — M75.42 IMPINGEMENT SYNDROME OF LEFT SHOULDER: Primary | ICD-10-CM

## 2024-09-16 DIAGNOSIS — M75.22 BICEPS TENDONITIS ON LEFT: ICD-10-CM

## 2024-09-16 PROCEDURE — 97530 THERAPEUTIC ACTIVITIES: CPT

## 2024-09-16 PROCEDURE — 97110 THERAPEUTIC EXERCISES: CPT

## 2024-09-16 NOTE — PROGRESS NOTES
Daily Note     Today's date: 2024  Patient name: Jim Snow  : 1950  MRN: [id number]  Referring provider: Jim Diaz DO  Dx:   Encounter Diagnosis     ICD-10-CM    1. Impingement syndrome of left shoulder  M75.42       2. Biceps tendonitis on left  M75.22             Start Time: 1515  Stop Time: 1555  Total time in clinic (min): 40 minutes    Subjective: Bandar has no new complaints today although he did note that  he had shoulder pain along with an intense pain in his left thumb which he couldn't attribute the pain to anything specific he had done last session or at home. The following day his pain was resolved and he has had no discomfort since.     Objective: See treatment diary below      Assessment: Bandar Tolerated treatment well with appropriate muscle fatigue experienced with ex's. He is making steady gains towards goals and remains appropriately challenged with his program. Required vc's t/o session for proper positioning with ex's. Bandar would benefit from continued PT and compliance with HEP. 1:1 with Cristina Ramos DPT for entirety of session.         Plan: Continue per plan of care.      Precautions: n/a    POC expires Unit limit Auth Expiration date PT/OT + Visit Limit?   2024 BOMN N/a BOMN         Visit/Unit Tracking  AUTH Status:  Date      N/a Used 1 2 3 4      Remaining             Pertinent Findings:      POC End Date: 2024                                                                                          Test / Measure     FOTO (Predicted 74) 74   Left shoulder F  90% !   Left shoulder Abd 75%    Left shoudler Abd Strength       Access Code: [email or web address]  URL: [email or web address]  Date: 2024  Prepared by: Cristina Ramos    Exercises  - Seated Shoulder Flexion Slide at Table Top with Forearm in Neutral  - 2 x daily - 10 reps - 10 hold  - Supine Shoulder External Rotation in 45 Degrees Abduction AAROM with Dowel  - 2 x daily -  "20 reps - 5 hold  - Shoulder External Rotation and Scapular Retraction with Resistance  - 2 x daily - 2 sets - 10 reps - 5 hold  - Standing Shoulder Flexion with Resistance  - 2 x daily - 2 sets - 10 reps  - Standing Single Arm Shoulder Abduction with Resistance  - 2 x daily - 2 sets - 10 reps  - Standing Single Arm Elbow Flexion with Resistance  - 2 x daily - 2 sets - 10 reps    Manuals 9/5 9/9 9/13 9/16         Post GHJ  TT AW                                                  Neuro Re-Ed             [place] ER/IR  2x30\"  30\"x2 ea  2x30\"         Supine Punch   2x10 5#  5# 2x10           Rhythmic Stabilization    1 min x2                                                               Ther Ex             Wall Walks              Unilateral Rows   2x10 16# 16# 3x10  18# 3x10          Unilateral Ext     8# 2x10          Pull Down              Shoulder Abd/F    2x10 3# ea          TB ER/IR  Btb 2x10 ER; 2 Btb IR   IR 4#   ER 4#  2x10 ea  IR 8# ER 4# 2x10 ea          Cable curl with shoulder Ext  2x10 10# 10# 2x10  10# 3x10          Foam Roll Press w/ ER  2x10 ytb OTB 2x10  OTB 2x10                      Ther Activity             UBE  3'/3' 3'/3' 4/4         Pulleys   30x  5\"x30  5\"x30          Gait Training                                       Modalities                                              "

## 2024-09-19 ENCOUNTER — OFFICE VISIT (OUTPATIENT)
Dept: PHYSICAL THERAPY | Facility: REHABILITATION | Age: 74
End: 2024-09-19
Payer: MEDICARE

## 2024-09-19 DIAGNOSIS — M75.22 BICEPS TENDONITIS ON LEFT: ICD-10-CM

## 2024-09-19 DIAGNOSIS — M75.42 IMPINGEMENT SYNDROME OF LEFT SHOULDER: Primary | ICD-10-CM

## 2024-09-19 PROCEDURE — 97110 THERAPEUTIC EXERCISES: CPT

## 2024-09-19 NOTE — PROGRESS NOTES
Daily Note     Today's date: 2024  Patient name: Jim Snow  : 1950  MRN: [id number]  Referring provider: Jim Diaz DO  Dx:   Encounter Diagnosis     ICD-10-CM    1. Impingement syndrome of left shoulder  M75.42       2. Biceps tendonitis on left  M75.22               Start Time: 1250  Stop Time: 1330  Total time in clinic (min): 40 minutes    Subjective: Bandar has no new complaints and is feeling better since starting physical therapy.    Objective: See treatment diary below      Assessment: Bandar tolerated treatment well with appropriate muscle fatigue experienced with ex's. Bandar continues to make steady progress and will benefit from transitioning toward more difficult variations. Bandar would benefit from continued PT and compliance with HEP. 1:1 with Cristina Ramos DPT for entirety of session.         Plan: Continue per plan of care.      Precautions: n/a    POC expires Unit limit Auth Expiration date PT/OT + Visit Limit?   2024 BOMN N/a BOMN         Visit/Unit Tracking  AUTH Status:  Date     N/a Used 1 2 3 4 5     Remaining             Pertinent Findings:      POC End Date: 2024                                                                                          Test / Measure     FOTO (Predicted 74) 74   Left shoulder F  90% !   Left shoulder Abd 75%    Left shoudler Abd Strength       Access Code: [email or web address]  URL: [email or web address]  Date: 2024  Prepared by: Cristina Ramos    Exercises  - Seated Shoulder Flexion Slide at Table Top with Forearm in Neutral  - 2 x daily - 10 reps - 10 hold  - Supine Shoulder External Rotation in 45 Degrees Abduction AAROM with Dowel  - 2 x daily - 20 reps - 5 hold  - Shoulder External Rotation and Scapular Retraction with Resistance  - 2 x daily - 2 sets - 10 reps - 5 hold  - Standing Shoulder Flexion with Resistance  - 2 x daily - 2 sets - 10 reps  - Standing Single Arm Shoulder Abduction with  "Resistance  - 2 x daily - 2 sets - 10 reps  - Standing Single Arm Elbow Flexion with Resistance  - 2 x daily - 2 sets - 10 reps    Manuals 9/5 9/9 9/13 9/16 9/19        Post GHJ  TT AW                                                  Neuro Re-Ed             [place] ER/IR  2x30\"  30\"x2 ea  2x30\" 2x30\"         Supine Punch   2x10 5#  5# 2x10           Rhythmic Stabilization    1 min x2           D1/D2 Flexion      2x20 ea mtb         D1/D2 Ext     20x ea                                   Ther Ex             Wall Walks              Unilateral Rows   2x10 16# 16# 3x10  18# 3x10  18# 3x10         Unilateral Ext     8# 2x10  8# 2x10         Pull Down              Shoulder Abd/F    2x10 3# ea  2x10 5# ea        TB ER/IR  Btb 2x10 ER; 2 Btb IR   IR 4#   ER 4#  2x10 ea  IR 8# ER 4# 2x10 ea  IR 9# ER 5# 2x10 ea         Cable curl with shoulder Ext  2x10 10# 10# 2x10  10# 3x10  15# 1x10, 1x6         Foam Roll Press w/ ER  2x10 ytb OTB 2x10  OTB 2x10 OTB 2x10                      Ther Activity             UBE  3'/3' 3'/3' 4/4 4/4        Pulleys   30x  5\"x30  5\"x30          Gait Training                                       Modalities                                              "

## 2024-09-23 ENCOUNTER — OFFICE VISIT (OUTPATIENT)
Dept: PHYSICAL THERAPY | Facility: REHABILITATION | Age: 74
End: 2024-09-23
Payer: MEDICARE

## 2024-09-23 DIAGNOSIS — M75.22 BICEPS TENDONITIS ON LEFT: ICD-10-CM

## 2024-09-23 DIAGNOSIS — M75.42 IMPINGEMENT SYNDROME OF LEFT SHOULDER: Primary | ICD-10-CM

## 2024-09-23 PROCEDURE — 97110 THERAPEUTIC EXERCISES: CPT

## 2024-09-23 PROCEDURE — 97530 THERAPEUTIC ACTIVITIES: CPT

## 2024-09-23 PROCEDURE — 97112 NEUROMUSCULAR REEDUCATION: CPT

## 2024-09-23 NOTE — PROGRESS NOTES
Daily Note     Today's date: 2024  Patient name: Jim Snow  : 1950  MRN: [id number]  Referring provider: Jim Diaz DO  Dx:   Encounter Diagnosis     ICD-10-CM    1. Impingement syndrome of left shoulder  M75.42       2. Biceps tendonitis on left  M75.22                 Start Time: 1045  Stop Time: 1130  Total time in clinic (min): 45 minutes    Subjective: Bandar has no new complaints and is feeling better since starting physical therapy.    Objective: See treatment diary below      Assessment: Bandar tolerated treatment well with appropriate muscle fatigue experienced with ex's. Bandar continues to make steady progress and will benefit from transitioning toward more difficult variations. Bandar would benefit from continued PT and compliance with HEP. 1:1 with Cristina Ramos DPT for entirety of session.         Plan: Continue per plan of care.      Precautions: n/a    POC expires Unit limit Auth Expiration date PT/OT + Visit Limit?   2024 BOMN N/a BOMN         Visit/Unit Tracking  AUTH Status:  Date    N/a Used 1 2 3 4 5 6    Remaining             Pertinent Findings:      POC End Date: 2024                                                                                          Test / Measure     FOTO (Predicted 74) 74   Left shoulder F  90% !   Left shoulder Abd 75%    Left shoudler Abd Strength       Access Code: [email or web address]  URL: [email or web address]  Date: 2024  Prepared by: Cristina Ramos    Exercises  - Seated Shoulder Flexion Slide at Table Top with Forearm in Neutral  - 2 x daily - 10 reps - 10 hold  - Supine Shoulder External Rotation in 45 Degrees Abduction AAROM with Dowel  - 2 x daily - 20 reps - 5 hold  - Shoulder External Rotation and Scapular Retraction with Resistance  - 2 x daily - 2 sets - 10 reps - 5 hold  - Standing Shoulder Flexion with Resistance  - 2 x daily - 2 sets - 10 reps  - Standing Single Arm Shoulder Abduction with  "Resistance  - 2 x daily - 2 sets - 10 reps  - Standing Single Arm Elbow Flexion with Resistance  - 2 x daily - 2 sets - 10 reps    Manuals 9/5 9/9 9/13 9/16 9/19 9/23       Post GHJ  TT AW                                                  Neuro Re-Ed             [place] ER/IR  2x30\"  30\"x2 ea  2x30\" 2x30\"  2x30\"        Supine Punch   2x10 5#  5# 2x10           Push Up Plus       10x, 20x        Rhythmic Stabilization    1 min x2           D1/D2 Flexion      2x20 ea mtb  2x10 ea 5#        D1/D2 Ext     20x ea  2x10 ea 7#                                 Ther Ex             Wall Walks              Unilateral Rows   2x10 16# 16# 3x10  18# 3x10  18# 3x10  20# 3x10        Unilateral Ext     8# 2x10  8# 2x10  8# 2x10        Pull Down              Shoulder Abd/F    2x10 3# ea  2x10 5# ea         TB ER/IR  Btb 2x10 ER; 2 Btb IR   IR 4#   ER 4#  2x10 ea  IR 8# ER 4# 2x10 ea  IR 9# ER 5# 2x10 ea  ER 5# IR 9# 2x10 ea       Cable curl with shoulder Ext  2x10 10# 10# 2x10  10# 3x10  15# 1x10, 1x6  15# 2x10        Foam Roll Press w/ ER  2x10 ytb OTB 2x10  OTB 2x10 OTB 2x10 OTB 2x10                      Ther Activity             UBE  3'/3' 3'/3' 4/4 4/4 4/4       Pulleys   30x  5\"x30  5\"x30          Gait Training                                       Modalities                                              "

## 2024-09-26 ENCOUNTER — OFFICE VISIT (OUTPATIENT)
Dept: PHYSICAL THERAPY | Facility: REHABILITATION | Age: 74
End: 2024-09-26
Payer: MEDICARE

## 2024-09-26 DIAGNOSIS — M75.22 BICEPS TENDONITIS ON LEFT: ICD-10-CM

## 2024-09-26 DIAGNOSIS — M75.42 IMPINGEMENT SYNDROME OF LEFT SHOULDER: Primary | ICD-10-CM

## 2024-09-26 PROCEDURE — 97110 THERAPEUTIC EXERCISES: CPT

## 2024-09-26 PROCEDURE — 97530 THERAPEUTIC ACTIVITIES: CPT

## 2024-09-30 ENCOUNTER — OFFICE VISIT (OUTPATIENT)
Dept: PHYSICAL THERAPY | Facility: REHABILITATION | Age: 74
End: 2024-09-30
Payer: MEDICARE

## 2024-09-30 DIAGNOSIS — M75.22 BICEPS TENDONITIS ON LEFT: ICD-10-CM

## 2024-09-30 DIAGNOSIS — M75.42 IMPINGEMENT SYNDROME OF LEFT SHOULDER: Primary | ICD-10-CM

## 2024-09-30 PROCEDURE — 97530 THERAPEUTIC ACTIVITIES: CPT | Performed by: PHYSICAL THERAPIST

## 2024-09-30 PROCEDURE — 97110 THERAPEUTIC EXERCISES: CPT | Performed by: PHYSICAL THERAPIST

## 2024-09-30 PROCEDURE — 97112 NEUROMUSCULAR REEDUCATION: CPT | Performed by: PHYSICAL THERAPIST

## 2024-09-30 NOTE — PROGRESS NOTES
Daily Note     Today's date: 2024  Patient name: Jim Snow  : 1950  MRN: [id number]  Referring provider: Jim Diaz DO  Dx:   Encounter Diagnosis     ICD-10-CM    1. Impingement syndrome of left shoulder  M75.42       2. Biceps tendonitis on left  M75.22                      Subjective: Progressing well, reduced pain.     Objective: See treatment diary below    Assessment: Tolerated treatment well. Patient demonstrated fatigue post treatment, exhibited good technique with therapeutic exercises, and would benefit from continued PT 1:1 with Tomas Salinas DPT for entirety of tx.     Plan: Continue per plan of care.      Precautions: n/a    POC expires Unit limit Auth Expiration date PT/OT + Visit Limit?   2024 BOMN N/a BOMN         Visit/Unit Tracking  AUTH Status:  Date    N/a Used 1 2 3 4 5 6    Remaining             Pertinent Findings:      POC End Date: 2024                                                                                          Test / Measure     FOTO (Predicted 74) 74   Left shoulder F  90% !   Left shoulder Abd 75%    Left shoudler Abd Strength       Access Code: [email or web address]  URL: [email or web address]  Date: 2024  Prepared by: Cristina Ramos    Exercises  - Seated Shoulder Flexion Slide at Table Top with Forearm in Neutral  - 2 x daily - 10 reps - 10 hold  - Supine Shoulder External Rotation in 45 Degrees Abduction AAROM with Dowel  - 2 x daily - 20 reps - 5 hold  - Shoulder External Rotation and Scapular Retraction with Resistance  - 2 x daily - 2 sets - 10 reps - 5 hold  - Standing Shoulder Flexion with Resistance  - 2 x daily - 2 sets - 10 reps  - Standing Single Arm Shoulder Abduction with Resistance  - 2 x daily - 2 sets - 10 reps  - Standing Single Arm Elbow Flexion with Resistance  - 2 x daily - 2 sets - 10 reps    Manuals    Post GHJ  TT AW                             "              Neuro Re-Ed           Body Blade ER/IR  2x30\"  30\"x2 ea  2x30\" 2x30\"  2x30\"  2x40\" 2x40\"   Supine Punch   2x10 5#  5# 2x10         Push Up Plus       10x, 20x  Counter 2x10  Counter 2x10    Rhythmic Stabilization    1 min x2         D1/D2 Flexion      2x20 ea mtb  2x10 ea 5#   2x10 ea 5#    D1/D2 Ext     20x ea  2x10 ea 7#  2x10 ea 7#                         Ther Ex           Wall Walks            Unilateral Rows   2x10 16# 16# 3x10  18# 3x10  18# 3x10  20# 3x10  20# 3x10  20# 3x10    Unilateral Ext     8# 2x10  8# 2x10  8# 2x10  8# 3x10  8# 3x10    Pull Down            Shoulder Abd/F    2x10 3# ea  2x10 5# ea       TB ER/IR  Btb 2x10 ER; 2 Btb IR   IR 4#   ER 4#  2x10 ea  IR 8# ER 4# 2x10 ea  IR 9# ER 5# 2x10 ea  ER 5# IR 9# 2x10 ea ER 5# IR 10# 2x10 ea  ER 5# IR 10# 2x10 ea    Cable curl with shoulder Ext  2x10 10# 10# 2x10  10# 3x10  15# 1x10, 1x6  15# 2x10  15# 2x10  15# 2x10    Foam Roll Press w/ ER  2x10 ytb OTB 2x10  OTB 2x10 OTB 2x10 OTB 2x10       Prone I/T/Y       2x10 ea     Ther Activity           UBE  3'/3' 3'/3' 4/4 4/4 4/4 4/4 4/4   Pulleys   30x  5\"x30  5\"x30        Gait Training                                 Modalities                                          "

## 2024-10-03 ENCOUNTER — OFFICE VISIT (OUTPATIENT)
Dept: PHYSICAL THERAPY | Facility: REHABILITATION | Age: 74
End: 2024-10-03
Payer: MEDICARE

## 2024-10-03 DIAGNOSIS — M75.42 IMPINGEMENT SYNDROME OF LEFT SHOULDER: Primary | ICD-10-CM

## 2024-10-03 DIAGNOSIS — M75.22 BICEPS TENDONITIS ON LEFT: ICD-10-CM

## 2024-10-03 PROBLEM — Z00.00 MEDICARE ANNUAL WELLNESS VISIT, SUBSEQUENT: Status: RESOLVED | Noted: 2018-09-07 | Resolved: 2024-10-03

## 2024-10-03 PROCEDURE — 97110 THERAPEUTIC EXERCISES: CPT

## 2024-10-03 PROCEDURE — 97112 NEUROMUSCULAR REEDUCATION: CPT

## 2024-10-03 PROCEDURE — 97530 THERAPEUTIC ACTIVITIES: CPT

## 2024-10-03 NOTE — PROGRESS NOTES
Daily Note     Today's date: 10/3/2024  Patient name: Jim Snow  : 1950  MRN: [id number]  Referring provider: Jim Diaz DO  Dx:   No diagnosis found.                 Subjective: Progressing well, reduced pain since last visit. Pt remarks that his pain is notably resolved and more of a dull ache now as opposed to the sharp pain he was having.      Objective: See treatment diary below    Assessment: Tolerated treatment well. Patient demonstrated fatigue post treatment, exhibited good technique with therapeutic exercises, and would benefit from continued PT 1:1 with Cristina Ramos, DPT for 40min and IEP for remainder.     Plan: Continue per plan of care.      Precautions: n/a    POC expires Unit limit Auth Expiration date PT/OT + Visit Limit?   2024 BOMN N/a BOMN         Visit/Unit Tracking  AUTH Status:  Date 9/5 9/9 9/13 9/16 9/19 9/23 9/26 9/30 10/3   N/a Used 1 2 3 4 5 6 7 8 9    Remaining                Pertinent Findings:      POC End Date: 2024                                                                                          Test / Measure     FOTO (Predicted 74) 74   Left shoulder F  90% !   Left shoulder Abd 75%    Left shoudler Abd Strength       Access Code: [email or web address]  URL: [email or web address]  Date: 2024  Prepared by: Cristina Ramos    Exercises  - Seated Shoulder Flexion Slide at Table Top with Forearm in Neutral  - 2 x daily - 10 reps - 10 hold  - Supine Shoulder External Rotation in 45 Degrees Abduction AAROM with Dowel  - 2 x daily - 20 reps - 5 hold  - Shoulder External Rotation and Scapular Retraction with Resistance  - 2 x daily - 2 sets - 10 reps - 5 hold  - Standing Shoulder Flexion with Resistance  - 2 x daily - 2 sets - 10 reps  - Standing Single Arm Shoulder Abduction with Resistance  - 2 x daily - 2 sets - 10 reps  - Standing Single Arm Elbow Flexion with Resistance  - 2 x daily - 2 sets - 10 reps    Manuals   "9/26 9/30 10/3   Post GHJ  TT AW                                              Neuro Re-Ed            Body Blade ER/IR  2x30\"  30\"x2 ea  2x30\" 2x30\"  2x30\"  2x40\" 2x40\" 2x40\"    Supine Punch   2x10 5#  5# 2x10          Push Up Plus       10x, 20x  Counter 2x10  Counter 2x10  Counter 2x10    Rhythmic Stabilization    1 min x2          D1/D2 Flexion      2x20 ea mtb  2x10 ea 5#   2x10 ea 5#     D1/D2 Ext     20x ea  2x10 ea 7#  2x10 ea 7#                            Ther Ex            Wall Walks             Unilateral Rows   2x10 16# 16# 3x10  18# 3x10  18# 3x10  20# 3x10  20# 3x10  20# 3x10  25# 3x10    Unilateral Ext     8# 2x10  8# 2x10  8# 2x10  8# 3x10  8# 3x10  10# 2x10    Pull Down             Shoulder Abd/F    2x10 3# ea  2x10 5# ea        TB ER/IR  Btb 2x10 ER; 2 Btb IR   IR 4#   ER 4#  2x10 ea  IR 8# ER 4# 2x10 ea  IR 9# ER 5# 2x10 ea  ER 5# IR 9# 2x10 ea ER 5# IR 10# 2x10 ea  ER 5# IR 10# 2x10 ea  ER 6# IR 13# 2x10 ea    Cable curl with shoulder Ext  2x10 10# 10# 2x10  10# 3x10  15# 1x10, 1x6  15# 2x10  15# 2x10  15# 2x10  15# 2x10    Foam Roll Press w/ ER  2x10 ytb OTB 2x10  OTB 2x10 OTB 2x10 OTB 2x10        Prone I/T/Y       2x10 ea   2x10 ea    90/90 KB Carry          4 laps mirror 15#KB   Ther Activity            UBE  3'/3' 3'/3' 4/4 4/4 4/4 4/4 4/4 4/4    Pulleys   30x  5\"x30  5\"x30         Gait Training                                    Modalities                                             "

## 2024-10-07 ENCOUNTER — OFFICE VISIT (OUTPATIENT)
Dept: PHYSICAL THERAPY | Facility: REHABILITATION | Age: 74
End: 2024-10-07
Payer: MEDICARE

## 2024-10-07 DIAGNOSIS — M75.42 IMPINGEMENT SYNDROME OF LEFT SHOULDER: Primary | ICD-10-CM

## 2024-10-07 DIAGNOSIS — M75.22 BICEPS TENDONITIS ON LEFT: ICD-10-CM

## 2024-10-07 PROCEDURE — 97112 NEUROMUSCULAR REEDUCATION: CPT

## 2024-10-07 PROCEDURE — 97530 THERAPEUTIC ACTIVITIES: CPT

## 2024-10-07 PROCEDURE — 97110 THERAPEUTIC EXERCISES: CPT

## 2024-10-07 NOTE — PROGRESS NOTES
Daily Note     Today's date: 10/7/2024  Patient name: Jim Snow  : 1950  MRN: [id number]  Referring provider: Jim Diaz DO  Dx:   Encounter Diagnosis     ICD-10-CM    1. Impingement syndrome of left shoulder  M75.42       2. Biceps tendonitis on left  M75.22             Start Time: 1215  Stop Time: 1300  Total time in clinic (min): 45 minutes    Subjective: Progressing well, though pt notes some low level pain still remains.    Objective: See treatment diary below    Assessment: Continues to progress session to session. Tolerated treatment well. Patient demonstrated fatigue post treatment, exhibited good technique with therapeutic exercises, and would benefit from continued PT 1:1 with Cristina Ramos, DPT for 38min and IEP for remainder.     Plan: Continue per plan of care.      Precautions: n/a    POC expires Unit limit Auth Expiration date PT/OT + Visit Limit?   2024 BOMN N/a BOMN         Visit/Unit Tracking  AUTH Status:  Date 9/5 9/9 9/13 9/16 9/19 9/23 9/26 9/30 10/3 10/7   N/a Used 1 2 3 4 5 6 7 8 9 10    Remaining                 Pertinent Findings:      POC End Date: 2024                                                                                          Test / Measure     FOTO (Predicted 74) 74   Left shoulder F  90% !   Left shoulder Abd 75%    Left shoudler Abd Strength       Access Code: [email or web address]  URL: [email or web address]  Date: 2024  Prepared by: Cristina Ramos    Exercises  - Seated Shoulder Flexion Slide at Table Top with Forearm in Neutral  - 2 x daily - 10 reps - 10 hold  - Supine Shoulder External Rotation in 45 Degrees Abduction AAROM with Dowel  - 2 x daily - 20 reps - 5 hold  - Shoulder External Rotation and Scapular Retraction with Resistance  - 2 x daily - 2 sets - 10 reps - 5 hold  - Standing Shoulder Flexion with Resistance  - 2 x daily - 2 sets - 10 reps  - Standing Single Arm Shoulder Abduction with Resistance  - 2 x daily - 2 sets -  "10 reps  - Standing Single Arm Elbow Flexion with Resistance  - 2 x daily - 2 sets - 10 reps    Manuals 9/5 9/9 9/13 9/16 9/19 9/23 9/26 9/30 10/3 10/7   Post GHJ  TT AW                                                  Neuro Re-Ed             Body Blade ER/IR  2x30\"  30\"x2 ea  2x30\" 2x30\"  2x30\"  2x40\" 2x40\" 2x40\"  2x45\"   Supine Punch   2x10 5#  5# 2x10           Push Up Plus       10x, 20x  Counter 2x10  Counter 2x10  Counter 2x10  Kneeling Push up 2x10    Rhythmic Stabilization    1 min x2           D1/D2 Flexion      2x20 ea mtb  2x10 ea 5#   2x10 ea 5#      D1/D2 Ext     20x ea  2x10 ea 7#  2x10 ea 7#                               Ther Ex             Wall Walks              Unilateral Rows   2x10 16# 16# 3x10  18# 3x10  18# 3x10  20# 3x10  20# 3x10  20# 3x10  25# 3x10  25# 3x10    Unilateral Ext     8# 2x10  8# 2x10  8# 2x10  8# 3x10  8# 3x10  10# 2x10  10# 2x10    Pull Down              Shoulder Abd/F    2x10 3# ea  2x10 5# ea         TB ER/IR  Btb 2x10 ER; 2 Btb IR   IR 4#   ER 4#  2x10 ea  IR 8# ER 4# 2x10 ea  IR 9# ER 5# 2x10 ea  ER 5# IR 9# 2x10 ea ER 5# IR 10# 2x10 ea  ER 5# IR 10# 2x10 ea  ER 6# IR 13# 2x10 ea  ER 6# IR 13# 2x10 ea    Cable curl with shoulder Ext  2x10 10# 10# 2x10  10# 3x10  15# 1x10, 1x6  15# 2x10  15# 2x10  15# 2x10  15# 2x10  15# 2x10     Foam Roll Press w/ ER  2x10 ytb OTB 2x10  OTB 2x10 OTB 2x10 OTB 2x10         Prone I/T/Y       2x10 ea   2x10 ea  3x10 ea    90/90 KB Carry          4 laps mirror 15#KB   4 laps mirror 15#KB   Ther Activity             UBE  3'/3' 3'/3' 4/4 4/4 4/4 4/4 4/4 4/4 4/4   Pulleys   30x  5\"x30  5\"x30          Gait Training                                       Modalities                                                "

## 2024-10-10 ENCOUNTER — OFFICE VISIT (OUTPATIENT)
Dept: PHYSICAL THERAPY | Facility: REHABILITATION | Age: 74
End: 2024-10-10
Payer: MEDICARE

## 2024-10-10 DIAGNOSIS — M75.22 BICEPS TENDONITIS ON LEFT: ICD-10-CM

## 2024-10-10 DIAGNOSIS — M75.42 IMPINGEMENT SYNDROME OF LEFT SHOULDER: Primary | ICD-10-CM

## 2024-10-10 PROCEDURE — 97110 THERAPEUTIC EXERCISES: CPT

## 2024-10-10 PROCEDURE — 97112 NEUROMUSCULAR REEDUCATION: CPT

## 2024-10-10 NOTE — PROGRESS NOTES
Daily Note     Today's date: 10/10/2024  Patient name: Jim Snow  : 1950  MRN: [id number]  Referring provider: Jim Diaz DO  Dx:   Encounter Diagnosis     ICD-10-CM    1. Impingement syndrome of left shoulder  M75.42       2. Biceps tendonitis on left  M75.22               Start Time: 1230  Stop Time: 1315  Total time in clinic (min): 45 minutes    Subjective: Bandar notes he is noticing his pain levels continue to decrease.     Objective: See treatment diary below    Assessment: Continues to progress session to session. Tolerated treatment well. Patient demonstrated fatigue post treatment, exhibited good technique with therapeutic exercises, and would benefit from continued PT 1:1 with Cristina Ramos, DPT for 30min and IEP for remainder.     Plan: Continue per plan of care.      Precautions: n/a    POC expires Unit limit Auth Expiration date PT/OT + Visit Limit?   2024 BOMN N/a BOMN         Visit/Unit Tracking  AUTH Status:  Date 9/5 9/9 9/13 9/16 9/19 9/23 9/26 9/30 10/3 10/7 10/10   N/a Used 1 2 3 4 5 6 7 8 9 10 11    Remaining                  Pertinent Findings:      POC End Date: 2024                                                                                          Test / Measure     FOTO (Predicted 74) 74   Left shoulder F  90% !   Left shoulder Abd 75%    Left shoudler Abd Strength       Access Code: [email or web address]  URL: [email or web address]  Date: 2024  Prepared by: Cristina Ramos    Exercises  - Seated Shoulder Flexion Slide at Table Top with Forearm in Neutral  - 2 x daily - 10 reps - 10 hold  - Supine Shoulder External Rotation in 45 Degrees Abduction AAROM with Dowel  - 2 x daily - 20 reps - 5 hold  - Shoulder External Rotation and Scapular Retraction with Resistance  - 2 x daily - 2 sets - 10 reps - 5 hold  - Standing Shoulder Flexion with Resistance  - 2 x daily - 2 sets - 10 reps  - Standing Single Arm Shoulder Abduction with Resistance  - 2 x daily -  "2 sets - 10 reps  - Standing Single Arm Elbow Flexion with Resistance  - 2 x daily - 2 sets - 10 reps    Manuals 9/5 9/9 9/13 9/16 9/19 9/23 9/26 9/30 10/3 10/7 10/10   Post GHJ  TT AW                                                      Neuro Re-Ed              Body Blade ER/IR  2x30\"  30\"x2 ea  2x30\" 2x30\"  2x30\"  2x40\" 2x40\" 2x40\"  2x45\" 2x45\"    Supine Punch   2x10 5#  5# 2x10            Push Up Plus       10x, 20x  Counter 2x10  Counter 2x10  Counter 2x10  Kneeling Push up 2x10  Kneeling Pushups 2x10    Rhythmic Stabilization    1 min x2            D1/D2 Flexion      2x20 ea mtb  2x10 ea 5#   2x10 ea 5#       D1/D2 Ext     20x ea  2x10 ea 7#  2x10 ea 7#                                  Ther Ex              Wall Walks               Unilateral Rows   2x10 16# 16# 3x10  18# 3x10  18# 3x10  20# 3x10  20# 3x10  20# 3x10  25# 3x10  25# 3x10  25# 3x10    Unilateral Ext     8# 2x10  8# 2x10  8# 2x10  8# 3x10  8# 3x10  10# 2x10  10# 2x10  10# 2x10   Pull Down               Shoulder Abd/F    2x10 3# ea  2x10 5# ea          TB ER/IR  Btb 2x10 ER; 2 Btb IR   IR 4#   ER 4#  2x10 ea  IR 8# ER 4# 2x10 ea  IR 9# ER 5# 2x10 ea  ER 5# IR 9# 2x10 ea ER 5# IR 10# 2x10 ea  ER 5# IR 10# 2x10 ea  ER 6# IR 13# 2x10 ea  ER 6# IR 13# 2x10 ea  ER 6# IR 13# 3x10 ea    Cable curl with shoulder Ext  2x10 10# 10# 2x10  10# 3x10  15# 1x10, 1x6  15# 2x10  15# 2x10  15# 2x10  15# 2x10  15# 2x10   15# 2x10   Foam Roll Press w/ ER  2x10 ytb OTB 2x10  OTB 2x10 OTB 2x10 OTB 2x10          Prone I/T/Y       2x10 ea   2x10 ea  3x10 ea  3x10 ea   90/90 KB Carry          4 laps mirror 15#KB   4 laps mirror 15#KB   4 laps mirror 15#KB   Ther Activity              UBE  3'/3' 3'/3' 4/4 4/4 4/4 4/4 4/4 4/4 4/4 4/4   Pulleys   30x  5\"x30  5\"x30           Gait Training                                          Modalities                                                   "

## 2024-10-14 ENCOUNTER — OFFICE VISIT (OUTPATIENT)
Dept: PHYSICAL THERAPY | Facility: REHABILITATION | Age: 74
End: 2024-10-14
Payer: MEDICARE

## 2024-10-14 DIAGNOSIS — M75.22 BICEPS TENDONITIS ON LEFT: ICD-10-CM

## 2024-10-14 DIAGNOSIS — M75.42 IMPINGEMENT SYNDROME OF LEFT SHOULDER: Primary | ICD-10-CM

## 2024-10-14 PROCEDURE — 97112 NEUROMUSCULAR REEDUCATION: CPT

## 2024-10-14 PROCEDURE — 97110 THERAPEUTIC EXERCISES: CPT

## 2024-10-14 NOTE — PROGRESS NOTES
Daily Note     Today's date: 10/14/2024  Patient name: Jim Snow  : 1950  MRN: [id number]  Referring provider: Jim Diaz DO  Dx:   Encounter Diagnosis     ICD-10-CM    1. Impingement syndrome of left shoulder  M75.42       2. Biceps tendonitis on left  M75.22               Start Time: 1220  Stop Time: 1252  Total time in clinic (min): 32 minutes    Subjective: Pt reports that he was doing work overhead for a long period of time yesterday therefore had soreness.     Objective: See treatment diary below    Assessment: Continues to progress session to session. Pt was most challenged by ROM on pball this visit. He noted no exacerbations of symptoms outside of ms soreness. He was educated on DOMS and HEP was reviewed. Tolerated treatment well. Patient demonstrated fatigue post treatment, exhibited good technique with therapeutic exercises, and would benefit from continued PT     Plan: Continue per plan of care.      Precautions: n/a    POC expires Unit limit Auth Expiration date PT/OT + Visit Limit?   2024 BOMN N/a BOMN         Visit/Unit Tracking  AUTH Status:  Date 9/5 9/9 9/13 9/16 9/19 9/23 9/26 9/30 10/3 10/7 10/10 10/14   N/a Used 1 2 3 4 5 6 7 8 9 10 11 12    Remaining                   Pertinent Findings:      POC End Date: 2024                                                                                          Test / Measure     FOTO (Predicted 74) 74   Left shoulder F  90% !   Left shoulder Abd 75%    Left shoudler Abd Strength       Access Code: [email or web address]  URL: [email or web address]  Date: 2024  Prepared by: Cristina Ramos    Exercises  - Seated Shoulder Flexion Slide at Table Top with Forearm in Neutral  - 2 x daily - 10 reps - 10 hold  - Supine Shoulder External Rotation in 45 Degrees Abduction AAROM with Dowel  - 2 x daily - 20 reps - 5 hold  - Shoulder External Rotation and Scapular Retraction with Resistance  - 2 x daily - 2 sets - 10 reps - 5  "hold  - Standing Shoulder Flexion with Resistance  - 2 x daily - 2 sets - 10 reps  - Standing Single Arm Shoulder Abduction with Resistance  - 2 x daily - 2 sets - 10 reps  - Standing Single Arm Elbow Flexion with Resistance  - 2 x daily - 2 sets - 10 reps    Manuals 9/5 9/9 9/13 9/16 9/19 9/23 9/26 9/30 10/3 10/7 10/10 10/14   Post GHJ  TT AW                                                          Neuro Re-Ed               Body Blade ER/IR  2x30\"  30\"x2 ea  2x30\" 2x30\"  2x30\"  2x40\" 2x40\" 2x40\"  2x45\" 2x45\"  2x45\"    Supine Punch   2x10 5#  5# 2x10             Push Up Plus       10x, 20x  Counter 2x10  Counter 2x10  Counter 2x10  Kneeling Push up 2x10  Kneeling Pushups 2x10  Kneeling Pushups 2x10    Rhythmic Stabilization    1 min x2             D1/D2 Flexion      2x20 ea mtb  2x10 ea 5#   2x10 ea 5#        D1/D2 Ext     20x ea  2x10 ea 7#  2x10 ea 7#                                     Ther Ex               Wall Walks                Unilateral Rows   2x10 16# 16# 3x10  18# 3x10  18# 3x10  20# 3x10  20# 3x10  20# 3x10  25# 3x10  25# 3x10  25# 3x10  25# 3x10    Unilateral Ext     8# 2x10  8# 2x10  8# 2x10  8# 3x10  8# 3x10  10# 2x10  10# 2x10  10# 2x10 10# 2x10   Pull Down                Shoulder Abd/F    2x10 3# ea  2x10 5# ea           TB ER/IR  Btb 2x10 ER; 2 Btb IR   IR 4#   ER 4#  2x10 ea  IR 8# ER 4# 2x10 ea  IR 9# ER 5# 2x10 ea  ER 5# IR 9# 2x10 ea ER 5# IR 10# 2x10 ea  ER 5# IR 10# 2x10 ea  ER 6# IR 13# 2x10 ea  ER 6# IR 13# 2x10 ea  ER 6# IR 13# 3x10 ea  ER 6# IR 13# 3x10 ea    Cable curl with shoulder Ext  2x10 10# 10# 2x10  10# 3x10  15# 1x10, 1x6  15# 2x10  15# 2x10  15# 2x10  15# 2x10  15# 2x10   15# 2x10 15# 2x10   Foam Roll Press w/ ER  2x10 ytb OTB 2x10  OTB 2x10 OTB 2x10 OTB 2x10           Prone I/T/Y       2x10 ea   2x10 ea  3x10 ea  3x10 ea 3x10 ea on pball    90/90 KB Carry          4 laps mirror 15#KB   4 laps mirror 15#KB   4 laps mirror 15#KB 4 laps mirror 15#KB   Ther Activity             " "  UBE  3'/3' 3'/3' 4/4 4/4 4/4 4/4 4/4 4/4 4/4 4/4 4/4   Pulleys   30x  5\"x30  5\"x30            Gait Training                                             Modalities                                                      "

## 2024-10-17 ENCOUNTER — OFFICE VISIT (OUTPATIENT)
Dept: PHYSICAL THERAPY | Facility: REHABILITATION | Age: 74
End: 2024-10-17
Payer: MEDICARE

## 2024-10-17 DIAGNOSIS — M75.22 BICEPS TENDONITIS ON LEFT: ICD-10-CM

## 2024-10-17 DIAGNOSIS — M75.42 IMPINGEMENT SYNDROME OF LEFT SHOULDER: Primary | ICD-10-CM

## 2024-10-17 PROCEDURE — 97110 THERAPEUTIC EXERCISES: CPT

## 2024-10-17 PROCEDURE — 97112 NEUROMUSCULAR REEDUCATION: CPT

## 2024-10-17 NOTE — PROGRESS NOTES
Daily Note     Today's date: 10/17/2024  Patient name: Jim Snow  : 1950  MRN: [id number]  Referring provider: Jim Diaz DO  Dx:   Encounter Diagnosis     ICD-10-CM    1. Impingement syndrome of left shoulder  M75.42       2. Biceps tendonitis on left  M75.22               Start Time: 1215  Stop Time: 1300  Total time in clinic (min): 45 minutes    Subjective: Pt reports that he feels great today but has intermittent pain with ADL's that require overhead lifting or strenuous effort.      Objective: See treatment diary below    Assessment: Continues to progress session to session. Pt was most challenged by ROM on pball this visit. Patient will benefit from increasing overhead strength and endurance to resume work activities. Tolerated treatment well. Patient demonstrated fatigue post treatment, exhibited good technique with therapeutic exercises, and would benefit from continued PT. 1:1 with Cristina Ramos DPT for entirety of session.       Plan: Continue per plan of care.      Precautions: n/a    POC expires Unit limit Auth Expiration date PT/OT + Visit Limit?   2024 BOMN N/a BOMN         Visit/Unit Tracking  AUTH Status:  Date 9/5 9/9 9/13 9/16 9/19 9/23 9/26 9/30 10/3 10/7 10/10 10/14 10/17    N/a Used 1 2 3 4 5 6 7 8 9 10 11 12 13     Remaining                     Pertinent Findings:      POC End Date: 2024                                                                                          Test / Measure     FOTO (Predicted 74) 74   Left shoulder F  90% !   Left shoulder Abd 75%    Left shoudler Abd Strength       Access Code: [email or web address]  URL: [email or web address]  Date: 2024  Prepared by: Cristina Ramos    Exercises  - Seated Shoulder Flexion Slide at Table Top with Forearm in Neutral  - 2 x daily - 10 reps - 10 hold  - Supine Shoulder External Rotation in 45 Degrees Abduction AAROM with Dowel  - 2 x daily - 20 reps - 5 hold  - Shoulder External Rotation and  "Scapular Retraction with Resistance  - 2 x daily - 2 sets - 10 reps - 5 hold  - Standing Shoulder Flexion with Resistance  - 2 x daily - 2 sets - 10 reps  - Standing Single Arm Shoulder Abduction with Resistance  - 2 x daily - 2 sets - 10 reps  - Standing Single Arm Elbow Flexion with Resistance  - 2 x daily - 2 sets - 10 reps    Manuals 9/5 9/9 9/13 9/16 9/19 9/23 9/26 9/30 10/3 10/7 10/10 10/14 10/17   Post GHJ  TT AW                                                              Neuro Re-Ed                Body Blade ER/IR  2x30\"  30\"x2 ea  2x30\" 2x30\"  2x30\"  2x40\" 2x40\" 2x40\"  2x45\" 2x45\"  2x45\"     Supine Punch   2x10 5#  5# 2x10              Push Up Plus       10x, 20x  Counter 2x10  Counter 2x10  Counter 2x10  Kneeling Push up 2x10  Kneeling Pushups 2x10  Kneeling Pushups 2x10  Kneeling Pushups 2x10    Rhythmic Stabilization    1 min x2              D1/D2 Flexion      2x20 ea mtb  2x10 ea 5#   2x10 ea 5#         D1/D2 Ext     20x ea  2x10 ea 7#  2x10 ea 7#                                        Ther Ex                Wall Walks                 Unilateral Rows   2x10 16# 16# 3x10  18# 3x10  18# 3x10  20# 3x10  20# 3x10  20# 3x10  25# 3x10  25# 3x10  25# 3x10  25# 3x10  27.5# 3x10    Unilateral Ext     8# 2x10  8# 2x10  8# 2x10  8# 3x10  8# 3x10  10# 2x10  10# 2x10  10# 2x10 10# 2x10 11# 2x10    Pull Down                 Shoulder Abd/F    2x10 3# ea  2x10 5# ea            TB ER/IR  Btb 2x10 ER; 2 Btb IR   IR 4#   ER 4#  2x10 ea  IR 8# ER 4# 2x10 ea  IR 9# ER 5# 2x10 ea  ER 5# IR 9# 2x10 ea ER 5# IR 10# 2x10 ea  ER 5# IR 10# 2x10 ea  ER 6# IR 13# 2x10 ea  ER 6# IR 13# 2x10 ea  ER 6# IR 13# 3x10 ea  ER 6# IR 13# 3x10 ea  ER 6# IR 13# 3x10 ea    Cable curl with shoulder Ext  2x10 10# 10# 2x10  10# 3x10  15# 1x10, 1x6  15# 2x10  15# 2x10  15# 2x10  15# 2x10  15# 2x10   15# 2x10 15# 2x10 15# 2x10    Foam Roll Press w/ ER  2x10 ytb OTB 2x10  OTB 2x10 OTB 2x10 OTB 2x10            Prone I/T/Y       2x10 ea   2x10 ea  " "3x10 ea  3x10 ea 3x10 ea on pball  2x10 ea 2#    90/90 KB Carry          4 laps mirror 15#KB   4 laps mirror 15#KB   4 laps mirror 15#KB 4 laps mirror 15#KB 4 laps mirror 15#KB   Ther Activity                UBE  3'/3' 3'/3' 4/4 4/4 4/4 4/4 4/4 4/4  4/4 4/4 4/4 4/4   Pulleys   30x  5\"x30  5\"x30             Dumbbell Press                2x10 15#    Gait Training                                                Modalities                                                         "

## 2024-10-21 ENCOUNTER — OFFICE VISIT (OUTPATIENT)
Dept: PHYSICAL THERAPY | Facility: REHABILITATION | Age: 74
End: 2024-10-21
Payer: MEDICARE

## 2024-10-21 DIAGNOSIS — M75.22 BICEPS TENDONITIS ON LEFT: Primary | ICD-10-CM

## 2024-10-21 DIAGNOSIS — M75.42 IMPINGEMENT SYNDROME OF LEFT SHOULDER: ICD-10-CM

## 2024-10-21 PROCEDURE — 97110 THERAPEUTIC EXERCISES: CPT

## 2024-10-21 PROCEDURE — 97112 NEUROMUSCULAR REEDUCATION: CPT

## 2024-10-21 NOTE — PROGRESS NOTES
Daily Note     Today's date: 10/21/2024  Patient name: Jim Snow  : 1950  MRN: [id number]  Referring provider: Jim Diaz DO  Dx:   Encounter Diagnosis     ICD-10-CM    1. Biceps tendonitis on left  M75.22       2. Impingement syndrome of left shoulder  M75.42               Start Time: 1405  Stop Time: 1445  Total time in clinic (min): 40 minutes    Subjective: Pt reports that he feels great today but has intermittent pain with ADL's that require overhead lifting or strenuous effort.      Objective: See treatment diary below    Assessment: Continues to progress session to session. Pt was most challenged by shoulder presses and push ups this visit. Pt also noted some pain during I/T/Ys.  Tolerated treatment well. Patient demonstrated fatigue post treatment, exhibited good technique with therapeutic exercises, and would benefit from continued PT. 1:1 with Cristina Ramos DPT for entirety of session.       Plan: Continue per plan of care.      Precautions: n/a    POC expires Unit limit Auth Expiration date PT/OT + Visit Limit?   2024 BOMN N/a BOMN         Visit/Unit Tracking  AUTH Status:  Date 9/5 9/9 9/13 9/16 9/19 9/23 9/26 9/30 10/3 10/7 10/10 10/14 10/17 10/21   N/a Used 1 2 3 4 5 6 7 8 9 10 11 12 13 14    Remaining                     Pertinent Findings:      POC End Date: 2024                                                                                          Test / Measure     FOTO (Predicted 74) 74   Left shoulder F  90% !   Left shoulder Abd 75%    Left shoudler Abd Strength       Access Code: [email or web address]  URL: [email or web address]  Date: 2024  Prepared by: Cristina Ramos    Exercises  - Seated Shoulder Flexion Slide at Table Top with Forearm in Neutral  - 2 x daily - 10 reps - 10 hold  - Supine Shoulder External Rotation in 45 Degrees Abduction AAROM with Dowel  - 2 x daily - 20 reps - 5 hold  - Shoulder External Rotation and Scapular Retraction with  "Resistance  - 2 x daily - 2 sets - 10 reps - 5 hold  - Standing Shoulder Flexion with Resistance  - 2 x daily - 2 sets - 10 reps  - Standing Single Arm Shoulder Abduction with Resistance  - 2 x daily - 2 sets - 10 reps  - Standing Single Arm Elbow Flexion with Resistance  - 2 x daily - 2 sets - 10 reps    Manuals 9/5 9/9 9/13 9/16 9/19 9/23 9/26 9/30 10/3 10/7 10/10 10/14 10/17 10/21   Post GHJ  TT AW                                                                  Neuro Re-Ed                 Body Blade ER/IR  2x30\"  30\"x2 ea  2x30\" 2x30\"  2x30\"  2x40\" 2x40\" 2x40\"  2x45\" 2x45\"  2x45\"      Supine Punch   2x10 5#  5# 2x10               Push Up Plus       10x, 20x  Counter 2x10  Counter 2x10  Counter 2x10  Kneeling Push up 2x10  Kneeling Pushups 2x10  Kneeling Pushups 2x10  Kneeling Pushups 2x10  Kneeling Push ups 2x10    Rhythmic Stabilization    1 min x2               D1/D2 Flexion      2x20 ea mtb  2x10 ea 5#   2x10 ea 5#          D1/D2 Ext     20x ea  2x10 ea 7#  2x10 ea 7#                                           Ther Ex                 Wall Walks                  Unilateral Rows   2x10 16# 16# 3x10  18# 3x10  18# 3x10  20# 3x10  20# 3x10  20# 3x10  25# 3x10  25# 3x10  25# 3x10  25# 3x10  27.5# 3x10  27.5# 3x10    Unilateral Ext     8# 2x10  8# 2x10  8# 2x10  8# 3x10  8# 3x10  10# 2x10  10# 2x10  10# 2x10 10# 2x10 11# 2x10  11# 2x10    Pull Down                  Shoulder Abd/F    2x10 3# ea  2x10 5# ea             TB ER/IR  Btb 2x10 ER; 2 Btb IR   IR 4#   ER 4#  2x10 ea  IR 8# ER 4# 2x10 ea  IR 9# ER 5# 2x10 ea  ER 5# IR 9# 2x10 ea ER 5# IR 10# 2x10 ea  ER 5# IR 10# 2x10 ea  ER 6# IR 13# 2x10 ea  ER 6# IR 13# 2x10 ea  ER 6# IR 13# 3x10 ea  ER 6# IR 13# 3x10 ea  ER 6# IR 13# 3x10 ea  ER 6# IR 13# 3x10 ea    Cable curl with shoulder Ext  2x10 10# 10# 2x10  10# 3x10  15# 1x10, 1x6  15# 2x10  15# 2x10  15# 2x10  15# 2x10  15# 2x10   15# 2x10 15# 2x10 15# 2x10  15# 2x10    Foam Roll Press w/ ER  2x10 ytb OTB 2x10  OTB " "2x10 OTB 2x10 OTB 2x10             Prone I/T/Y       2x10 ea   2x10 ea  3x10 ea  3x10 ea 3x10 ea on pball  2x10 ea 2#  2x10 ea 2#   90/90 KB Carry          4 laps mirror 15#KB   4 laps mirror 15#KB   4 laps mirror 15#KB 4 laps mirror 15#KB 4 laps mirror 15#KB   4 laps mirror 15#KB   Ther Activity                 UBE  3'/3' 3'/3' 4/4 4/4 4/4 4/4 4/4 4/4  4/4 4/4 4/4 4/4 4/4   Pulleys   30x  5\"x30  5\"x30              Dumbbell Press                2x10 15#    2x10 15#    Gait Training                                                   Modalities                                                            "

## 2024-10-24 ENCOUNTER — OFFICE VISIT (OUTPATIENT)
Dept: PHYSICAL THERAPY | Facility: REHABILITATION | Age: 74
End: 2024-10-24
Payer: MEDICARE

## 2024-10-24 DIAGNOSIS — M75.42 IMPINGEMENT SYNDROME OF LEFT SHOULDER: ICD-10-CM

## 2024-10-24 DIAGNOSIS — M75.22 BICEPS TENDONITIS ON LEFT: Primary | ICD-10-CM

## 2024-10-24 PROCEDURE — 97110 THERAPEUTIC EXERCISES: CPT

## 2024-10-24 PROCEDURE — 97530 THERAPEUTIC ACTIVITIES: CPT

## 2024-10-24 NOTE — PROGRESS NOTES
Daily Note     Today's date: 10/24/2024  Patient name: Jim Snow  : 1950  MRN: [id number]  Referring provider: Jim Diaz DO  Dx:   Encounter Diagnosis     ICD-10-CM    1. Biceps tendonitis on left  M75.22       2. Impingement syndrome of left shoulder  M75.42                 Start Time: 1235  Stop Time: 1315  Total time in clinic (min): 40 minutes    Subjective: Pt reports that he feels great today but has intermittent pain with ADL's that require overhead lifting or strenuous effort.      Objective: See treatment diary below    Assessment: Continues to progress session to session. Pt was most challenged by shoulder presses and push ups this visit. Pt also noted some pain during I/T/Ys.  Tolerated treatment well. Patient demonstrated fatigue post treatment, exhibited good technique with therapeutic exercises, and would benefit from continued PT. 1:1 with Cristina Ramos DPT for entirety of session.       Plan: Continue per plan of care.      Precautions: n/a    POC expires Unit limit Auth Expiration date PT/OT + Visit Limit?   2024 BOMN N/a BOMN         Visit/Unit Tracking  AUTH Status:  Date 9/5 9/9 9/13 9/16 9/19 9/23 9/26 9/30 10/3 10/7 10/10 10/14 10/17 10/21 10/24   N/a Used 1 2 3 4 5 6 7 8 9 10 11 12 13 14 15    Remaining                      Pertinent Findings:      POC End Date: 2024                                                                                          Test / Measure  9/5 10/24   FOTO (Predicted 74) 74 82   Left shoulder F  90% !    Left shoulder Abd 75%     Left shoudler Abd Strength        Access Code: [email or web address]  URL: [email or web address]  Date: 2024  Prepared by: Cristina Ramos    Exercises  - Seated Shoulder Flexion Slide at Table Top with Forearm in Neutral  - 2 x daily - 10 reps - 10 hold  - Supine Shoulder External Rotation in 45 Degrees Abduction AAROM with Dowel  - 2 x daily - 20 reps - 5 hold  - Shoulder External Rotation and  "Scapular Retraction with Resistance  - 2 x daily - 2 sets - 10 reps - 5 hold  - Standing Shoulder Flexion with Resistance  - 2 x daily - 2 sets - 10 reps  - Standing Single Arm Shoulder Abduction with Resistance  - 2 x daily - 2 sets - 10 reps  - Standing Single Arm Elbow Flexion with Resistance  - 2 x daily - 2 sets - 10 reps    Manuals 9/5 9/9 9/13 9/16 9/19 9/23 9/26 9/30 10/3 10/7 10/10 10/14 10/17 10/21 10/24   Post GHJ  TT AW                                                                      Neuro Re-Ed                  Body Blade ER/IR  2x30\"  30\"x2 ea  2x30\" 2x30\"  2x30\"  2x40\" 2x40\" 2x40\"  2x45\" 2x45\"  2x45\"       Supine Punch   2x10 5#  5# 2x10                Push Up Plus       10x, 20x  Counter 2x10  Counter 2x10  Counter 2x10  Kneeling Push up 2x10  Kneeling Pushups 2x10  Kneeling Pushups 2x10  Kneeling Pushups 2x10  Kneeling Push ups 2x10  Kneeling Push Up 2x10    Rhythmic Stabilization    1 min x2                D1/D2 Flexion      2x20 ea mtb  2x10 ea 5#   2x10 ea 5#           D1/D2 Ext     20x ea  2x10 ea 7#  2x10 ea 7#                                              Ther Ex                  Wall Walks                   Unilateral Rows   2x10 16# 16# 3x10  18# 3x10  18# 3x10  20# 3x10  20# 3x10  20# 3x10  25# 3x10  25# 3x10  25# 3x10  25# 3x10  27.5# 3x10  27.5# 3x10  27.5# 3x10    Unilateral Ext     8# 2x10  8# 2x10  8# 2x10  8# 3x10  8# 3x10  10# 2x10  10# 2x10  10# 2x10 10# 2x10 11# 2x10  11# 2x10  11# 2x10    Pull Down                   Shoulder Abd/F    2x10 3# ea  2x10 5# ea              TB ER/IR  Btb 2x10 ER; 2 Btb IR   IR 4#   ER 4#  2x10 ea  IR 8# ER 4# 2x10 ea  IR 9# ER 5# 2x10 ea  ER 5# IR 9# 2x10 ea ER 5# IR 10# 2x10 ea  ER 5# IR 10# 2x10 ea  ER 6# IR 13# 2x10 ea  ER 6# IR 13# 2x10 ea  ER 6# IR 13# 3x10 ea  ER 6# IR 13# 3x10 ea  ER 6# IR 13# 3x10 ea  ER 6# IR 13# 3x10 ea  ER 6# IR 13# 3x10 ea    Cable curl in shoulder Ext  2x10 10# 10# 2x10  10# 3x10  15# 1x10, 1x6  15# 2x10  15# 2x10  15# " "2x10  15# 2x10  15# 2x10   15# 2x10 15# 2x10 15# 2x10  15# 2x10  10# 2x15    Foam Roll Press w/ ER  2x10 ytb OTB 2x10  OTB 2x10 OTB 2x10 OTB 2x10              Prone I/T/Y       2x10 ea   2x10 ea  3x10 ea  3x10 ea 3x10 ea on pball  2x10 ea 2#  2x10 ea 2# 2x20 ea   90/90 KB Carry          4 laps mirror 15#KB   4 laps mirror 15#KB   4 laps mirror 15#KB 4 laps mirror 15#KB 4 laps mirror 15#KB   4 laps mirror 15#KB   4 laps mirror 15#KB   Ther Activity                  UBE  3'/3' 3'/3' 4/4 4/4 4/4 4/4 4/4 4/4  4/4 4/4 4/4 4/4 4/4 4/4 L2   Pulleys   30x  5\"x30  5\"x30               Dumbbell Press                2x10 15#    2x10 15#    2x10 12#    Gait Training                                                      Modalities                                                               "

## 2024-10-28 ENCOUNTER — OFFICE VISIT (OUTPATIENT)
Dept: PHYSICAL THERAPY | Facility: REHABILITATION | Age: 74
End: 2024-10-28
Payer: MEDICARE

## 2024-10-28 DIAGNOSIS — M75.22 BICEPS TENDONITIS ON LEFT: Primary | ICD-10-CM

## 2024-10-28 DIAGNOSIS — M75.42 IMPINGEMENT SYNDROME OF LEFT SHOULDER: ICD-10-CM

## 2024-10-28 PROCEDURE — 97110 THERAPEUTIC EXERCISES: CPT

## 2024-10-28 PROCEDURE — 97530 THERAPEUTIC ACTIVITIES: CPT

## 2024-10-28 NOTE — PROGRESS NOTES
Daily Note     Today's date: 10/28/2024  Patient name: Jim Snow  : 1950  MRN: [id number]  Referring provider: Jim Diaz DO  Dx:   Encounter Diagnosis     ICD-10-CM    1. Biceps tendonitis on left  M75.22       2. Impingement syndrome of left shoulder  M75.42                   Start Time: 1220  Stop Time: 1300  Total time in clinic (min): 40 minutes    Subjective: Bandar has no new complaints.      Objective: See treatment diary below    Assessment: Continues to progress session to session. Pt continues to be most challenged by shoulder presses and push ups this visit. Tolerated treatment well. Patient demonstrated fatigue post treatment, exhibited good technique with therapeutic exercises, and would benefit from continued PT. 1:1 with BERNIE WhitmoreT for 37 min and IEP for remainder.      Plan: Continue per plan of care.      Precautions: n/a    POC expires Unit limit Auth Expiration date PT/OT + Visit Limit?   2024 BOMN N/a BOMN         Visit/Unit Tracking  AUTH Status:  Date 10/3 10/7 10/10 10/14 10/17 10/21 10/24 10/28   N/a Used 9 10 11 12 13 14 15 16    Remaining               Pertinent Findings:      POC End Date: 2024                                                                                          Test / Measure  9/5 10/24   FOTO (Predicted 74) 74 82   Left shoulder F  90% !    Left shoulder Abd 75%     Left shoudler Abd Strength        Access Code: [email or web address]  URL: [email or web address]  Date: 2024  Prepared by: Cristina Ramos    Exercises  - Seated Shoulder Flexion Slide at Table Top with Forearm in Neutral  - 2 x daily - 10 reps - 10 hold  - Supine Shoulder External Rotation in 45 Degrees Abduction AAROM with Dowel  - 2 x daily - 20 reps - 5 hold  - Shoulder External Rotation and Scapular Retraction with Resistance  - 2 x daily - 2 sets - 10 reps - 5 hold  - Standing Shoulder Flexion with Resistance  - 2 x daily - 2 sets - 10 reps  - Standing Single  "Arm Shoulder Abduction with Resistance  - 2 x daily - 2 sets - 10 reps  - Standing Single Arm Elbow Flexion with Resistance  - 2 x daily - 2 sets - 10 reps    Manuals 10/3 10/7 10/10 10/14 10/17 10/21 10/24 10/28   Post GHJ                                            Neuro Re-Ed           Body Blade ER/IR 2x40\"  2x45\" 2x45\"  2x45\"        Supine Punch            Push Up Plus  Counter 2x10  Kneeling Push up 2x10  Kneeling Pushups 2x10  Kneeling Pushups 2x10  Kneeling Pushups 2x10  Kneeling Push ups 2x10  Kneeling Push Up 2x10  Kneeling Push Up 2x10    Rhythmic Stabilization            D1/D2 Flexion            D1/D2 Ext                                 Ther Ex           Wall Walks            Unilateral Rows  25# 3x10  25# 3x10  25# 3x10  25# 3x10  27.5# 3x10  27.5# 3x10  27.5# 3x10  27.5# 3x10   Unilateral Ext  10# 2x10  10# 2x10  10# 2x10 10# 2x10 11# 2x10  11# 2x10  11# 2x10  11# 2x10    Pull Down            Shoulder Abd/F           TB ER/IR ER 6# IR 13# 2x10 ea  ER 6# IR 13# 2x10 ea  ER 6# IR 13# 3x10 ea  ER 6# IR 13# 3x10 ea  ER 6# IR 13# 3x10 ea  ER 6# IR 13# 3x10 ea  ER 6# IR 13# 3x10 ea  ER 6# IR 13# 3x10 ea    Cable curl in shoulder Ext 15# 2x10  15# 2x10   15# 2x10 15# 2x10 15# 2x10  15# 2x10  10# 2x15  10# 2x15   Foam Roll Press w/ ER            Prone I/T/Y 2x10 ea  3x10 ea  3x10 ea 3x10 ea on pball  2x10 ea 2#  2x10 ea 2# 2x20 ea 2x20 ea   90/90 KB Carry  4 laps mirror 15#KB   4 laps mirror 15#KB   4 laps mirror 15#KB 4 laps mirror 15#KB 4 laps mirror 15#KB   4 laps mirror 15#KB   4 laps mirror 15#KB   4 laps mirror 15# KB   Ther Activity           UBE 4/4  4/4 4/4 4/4 4/4 4/4 4/4 L2 4/4 L2.5   Pulleys            Dumbbell Press        2x10 15#    2x10 15#    2x10 12#    2x5 20#   Gait Training                                 Modalities                                          "

## 2024-10-31 ENCOUNTER — OFFICE VISIT (OUTPATIENT)
Dept: PHYSICAL THERAPY | Facility: REHABILITATION | Age: 74
End: 2024-10-31
Payer: MEDICARE

## 2024-10-31 DIAGNOSIS — M75.22 BICEPS TENDONITIS ON LEFT: Primary | ICD-10-CM

## 2024-10-31 DIAGNOSIS — M75.42 IMPINGEMENT SYNDROME OF LEFT SHOULDER: ICD-10-CM

## 2024-10-31 PROCEDURE — 97530 THERAPEUTIC ACTIVITIES: CPT

## 2024-10-31 PROCEDURE — 97110 THERAPEUTIC EXERCISES: CPT

## 2024-10-31 NOTE — PROGRESS NOTES
Daily Note     Today's date: 10/31/2024  Patient name: Jim Snow  : 1950  MRN: [id number]  Referring provider: Jim Diaz DO  Dx:   Encounter Diagnosis     ICD-10-CM    1. Biceps tendonitis on left  M75.22       2. Impingement syndrome of left shoulder  M75.42                     Start Time: 1220  Stop Time: 1300  Total time in clinic (min): 40 minutes    Subjective: Bandar has no new complaints.      Objective: See treatment diary below    Assessment: Continues to progress session to session. Pt continues to be most challenged by shoulder presses and push ups this visit. Tolerated treatment well. Patient demonstrated fatigue post treatment, exhibited good technique with therapeutic exercises, and would benefit from continued PT. 1:1 with Cristina Ramos DPT for 38 min and IEP for remainder.      Plan: Continue per plan of care.      Precautions: n/a    POC expires Unit limit Auth Expiration date PT/OT + Visit Limit?   2024 BOMN N/a BOMN         Visit/Unit Tracking  AUTH Status:  Date 10/3 10/7 10/10 10/14 10/17 10/21 10/24 10/28 10/31   N/a Used 9 10 11 12 13 14 15 16 17    Remaining                Pertinent Findings:      POC End Date: 2024                                                                                          Test / Measure  9/5 10/24   FOTO (Predicted 74) 74 82   Left shoulder F  90% !    Left shoulder Abd 75%     Left shoudler Abd Strength        Access Code: [email or web address]  URL: [email or web address]  Date: 2024  Prepared by: Cristina Ramos    Exercises  - Seated Shoulder Flexion Slide at Table Top with Forearm in Neutral  - 2 x daily - 10 reps - 10 hold  - Supine Shoulder External Rotation in 45 Degrees Abduction AAROM with Dowel  - 2 x daily - 20 reps - 5 hold  - Shoulder External Rotation and Scapular Retraction with Resistance  - 2 x daily - 2 sets - 10 reps - 5 hold  - Standing Shoulder Flexion with Resistance  - 2 x daily - 2 sets - 10 reps  -  "Standing Single Arm Shoulder Abduction with Resistance  - 2 x daily - 2 sets - 10 reps  - Standing Single Arm Elbow Flexion with Resistance  - 2 x daily - 2 sets - 10 reps    Manuals 10/3 10/7 10/10 10/14 10/17 10/21 10/24 10/28 10/31   Post GHJ                                                Neuro Re-Ed            Body Blade ER/IR 2x40\"  2x45\" 2x45\"  2x45\"         Supine Punch             Push Up Plus  Counter 2x10  Kneeling Push up 2x10  Kneeling Pushups 2x10  Kneeling Pushups 2x10  Kneeling Pushups 2x10  Kneeling Push ups 2x10  Kneeling Push Up 2x10  Kneeling Push Up 2x10  Kneeling Push Up 2x10   Rhythmic Stabilization             D1/D2 Flexion             D1/D2 Ext                                    Ther Ex            Wall Walks             Unilateral Rows  25# 3x10  25# 3x10  25# 3x10  25# 3x10  27.5# 3x10  27.5# 3x10  27.5# 3x10  27.5# 3x10 27.5# 3x10    Unilateral Ext  10# 2x10  10# 2x10  10# 2x10 10# 2x10 11# 2x10  11# 2x10  11# 2x10  11# 2x10  11# 2x10    Pull Down             Shoulder Abd/F            TB ER/IR ER 6# IR 13# 2x10 ea  ER 6# IR 13# 2x10 ea  ER 6# IR 13# 3x10 ea  ER 6# IR 13# 3x10 ea  ER 6# IR 13# 3x10 ea  ER 6# IR 13# 3x10 ea  ER 6# IR 13# 3x10 ea  ER 6# IR 13# 3x10 ea  ER 7# IR 13# 3x10 ea    Cable curl in shoulder Ext 15# 2x10  15# 2x10   15# 2x10 15# 2x10 15# 2x10  15# 2x10  10# 2x15  10# 2x15 10# 3x15    Foam Roll Press w/ ER             Prone I/T/Y 2x10 ea  3x10 ea  3x10 ea 3x10 ea on pball  2x10 ea 2#  2x10 ea 2# 2x20 ea 2x20 ea 2x20 ea   90/90 KB Carry  4 laps mirror 15#KB   4 laps mirror 15#KB   4 laps mirror 15#KB 4 laps mirror 15#KB 4 laps mirror 15#KB   4 laps mirror 15#KB   4 laps mirror 15#KB   4 laps mirror 15# KB   4 laps mirror 15# KB   Ther Activity            UBE 4/4  4/4 4/4 4/4 4/4 4/4 4/4 L2 4/4 L2.5 4/4 L3    Pulleys             Dumbbell Press        2x10 15#    2x10 15#    2x10 12#    2x5 20#   2x5 20#    Gait Training                                    Modalities         "

## 2024-11-04 ENCOUNTER — OFFICE VISIT (OUTPATIENT)
Dept: PHYSICAL THERAPY | Facility: REHABILITATION | Age: 74
End: 2024-11-04
Payer: MEDICARE

## 2024-11-04 DIAGNOSIS — M75.42 IMPINGEMENT SYNDROME OF LEFT SHOULDER: ICD-10-CM

## 2024-11-04 DIAGNOSIS — M75.22 BICEPS TENDONITIS ON LEFT: Primary | ICD-10-CM

## 2024-11-04 PROCEDURE — 97110 THERAPEUTIC EXERCISES: CPT

## 2024-11-04 PROCEDURE — 97530 THERAPEUTIC ACTIVITIES: CPT

## 2024-11-04 NOTE — PROGRESS NOTES
Daily Note     Today's date: 2024  Patient name: Jim Snow  : 1950  MRN: [id number]  Referring provider: Jim Diaz DO  Dx:   Encounter Diagnosis     ICD-10-CM    1. Biceps tendonitis on left  M75.22       2. Impingement syndrome of left shoulder  M75.42                     Start Time: 1400  Stop Time: 1438  Total time in clinic (min): 38 minutes    Subjective: Bandar notes that his left thumb was feeling numb today after leaf blowing.    Objective: See treatment diary below    Assessment: Continues to progress session to session. Pt continues to be most challenged by shoulder presses and push ups this visit. Tolerated treatment well. Patient demonstrated fatigue post treatment, exhibited good technique with therapeutic exercises, and would benefit from continued PT. 1:1 with Cristina Ramos DPT for 38 min.      Plan: Continue per plan of care.      Precautions: n/a    POC expires Unit limit Auth Expiration date PT/OT + Visit Limit?   2024 BOMN N/a BOMN         Visit/Unit Tracking  AUTH Status:  Date 10/3 10/7 10/10 10/14 10/17 10/21 10/24 10/28 10/31   N/a Used 9 10 11 12 13 14 15 16 17    Remaining                Pertinent Findings:      POC End Date: 2024                                                                                          Test / Measure  9/5 10/24   FOTO (Predicted 74) 74 82   Left shoulder F  90% !    Left shoulder Abd 75%     Left shoudler Abd Strength        Access Code: [email or web address]  URL: [email or web address]  Date: 2024  Prepared by: Cristina Ramos    Exercises  - Seated Shoulder Flexion Slide at Table Top with Forearm in Neutral  - 2 x daily - 10 reps - 10 hold  - Supine Shoulder External Rotation in 45 Degrees Abduction AAROM with Dowel  - 2 x daily - 20 reps - 5 hold  - Shoulder External Rotation and Scapular Retraction with Resistance  - 2 x daily - 2 sets - 10 reps - 5 hold  - Standing Shoulder Flexion with Resistance  - 2 x daily - 2  "sets - 10 reps  - Standing Single Arm Shoulder Abduction with Resistance  - 2 x daily - 2 sets - 10 reps  - Standing Single Arm Elbow Flexion with Resistance  - 2 x daily - 2 sets - 10 reps    Manuals 10/3 10/7 10/10 10/14 10/17 10/21 10/24 10/28 10/31 11/4   Post GHJ                                                    Neuro Re-Ed             Body Blade ER/IR 2x40\"  2x45\" 2x45\"  2x45\"          Supine Punch              Push Up Plus  Counter 2x10  Kneeling Push up 2x10  Kneeling Pushups 2x10  Kneeling Pushups 2x10  Kneeling Pushups 2x10  Kneeling Push ups 2x10  Kneeling Push Up 2x10  Kneeling Push Up 2x10  Kneeling Push Up 2x10 Kneeling Push Up 2x10   Rhythmic Stabilization              D1/D2 Flexion              D1/D2 Ext                                       Ther Ex             Wall Walks              Unilateral Rows  25# 3x10  25# 3x10  25# 3x10  25# 3x10  27.5# 3x10  27.5# 3x10  27.5# 3x10  27.5# 3x10 27.5# 3x10  27.5# 3x10    Unilateral Ext  10# 2x10  10# 2x10  10# 2x10 10# 2x10 11# 2x10  11# 2x10  11# 2x10  11# 2x10  11# 2x10  11# 2x12   Pull Down              Shoulder Abd/F             TB ER/IR ER 6# IR 13# 2x10 ea  ER 6# IR 13# 2x10 ea  ER 6# IR 13# 3x10 ea  ER 6# IR 13# 3x10 ea  ER 6# IR 13# 3x10 ea  ER 6# IR 13# 3x10 ea  ER 6# IR 13# 3x10 ea  ER 6# IR 13# 3x10 ea  ER 7# IR 13# 3x10 ea  ER 7# IR 13# 3x10 ea   Cable curl in shoulder Ext 15# 2x10  15# 2x10   15# 2x10 15# 2x10 15# 2x10  15# 2x10  10# 2x15  10# 2x15 10# 3x15  10# 3x15   Foam Roll Press w/ ER              Prone I/T/Y 2x10 ea  3x10 ea  3x10 ea 3x10 ea on pball  2x10 ea 2#  2x10 ea 2# 2x20 ea 2x20 ea 2x20 ea 2x20 ea    90/90 KB Carry  4 laps mirror 15#KB   4 laps mirror 15#KB   4 laps mirror 15#KB 4 laps mirror 15#KB 4 laps mirror 15#KB   4 laps mirror 15#KB   4 laps mirror 15#KB   4 laps mirror 15# KB   4 laps mirror 15# KB   4 laps mirror 15# KB   Ther Activity             UBE 4/4 4/4 4/4 4/4 4/4 4/4 4/4 L2 4/4 L2.5 4/4 L3  4/4 L3    Pulleys      "         Dumbbell Press        2x10 15#    2x10 15#    2x10 12#    2x5 20#   2x5 20#    2x5 25#   Gait Training                                       Modalities

## 2024-11-07 ENCOUNTER — OFFICE VISIT (OUTPATIENT)
Dept: PHYSICAL THERAPY | Facility: REHABILITATION | Age: 74
End: 2024-11-07
Payer: MEDICARE

## 2024-11-07 DIAGNOSIS — M75.22 BICEPS TENDONITIS ON LEFT: Primary | ICD-10-CM

## 2024-11-07 DIAGNOSIS — M75.42 IMPINGEMENT SYNDROME OF LEFT SHOULDER: ICD-10-CM

## 2024-11-07 PROCEDURE — 97110 THERAPEUTIC EXERCISES: CPT

## 2024-11-07 NOTE — PROGRESS NOTES
Daily Note     Today's date: 2024  Patient name: Jim Snow  : 1950  MRN: [id number]  Referring provider: Jim Diaz DO  Dx:   Encounter Diagnosis     ICD-10-CM    1. Biceps tendonitis on left  M75.22       2. Impingement syndrome of left shoulder  M75.42                       Start Time: 1206  Stop Time: 1248  Total time in clinic (min): 42 minutes    Subjective: Bandar notes that he is starting to feel much better and mobility is nearly back to normal.    Objective: See treatment diary below    Assessment: Continues to progress session to session. Pt continues to be most challenged by shoulder presses and push ups this visit. Tolerated treatment well. Patient demonstrated fatigue post treatment, exhibited good technique with therapeutic exercises, and would benefit from continued PT. 1:1 with Cristina Ramos, DPT for 23 min and IEP for remainder.      Plan: Continue per plan of care.      Precautions: n/a    POC expires Unit limit Auth Expiration date PT/OT + Visit Limit?   2024 BOMN N/a BOMN         Visit/Unit Tracking  AUTH Status:  Date 10/3 10/7 10/10 10/14 10/17 10/21 10/24 10/28 10/31 11/7   N/a Used 9 10 11 12 13 14 15 16 17 18    Remaining                 Pertinent Findings:      POC End Date: 2024                                                                                          Test / Measure  9/5 10/24   FOTO (Predicted 74) 74 82   Left shoulder F  90% !    Left shoulder Abd 75%     Left shoudler Abd Strength        Access Code: [email or web address]  URL: [email or web address]  Date: 2024  Prepared by: Cristina Ramos    Exercises  - Seated Shoulder Flexion Slide at Table Top with Forearm in Neutral  - 2 x daily - 10 reps - 10 hold  - Supine Shoulder External Rotation in 45 Degrees Abduction AAROM with Dowel  - 2 x daily - 20 reps - 5 hold  - Shoulder External Rotation and Scapular Retraction with Resistance  - 2 x daily - 2 sets - 10 reps - 5 hold  - Standing  Shoulder Flexion with Resistance  - 2 x daily - 2 sets - 10 reps  - Standing Single Arm Shoulder Abduction with Resistance  - 2 x daily - 2 sets - 10 reps  - Standing Single Arm Elbow Flexion with Resistance  - 2 x daily - 2 sets - 10 reps    Manuals 11/4 11/7   Post GHJ                    Neuro Re-Ed     Push Up Plus  Kneeling Push Up 2x10                             Ther Ex     Unilateral Rows  27.5# 3x10  27.5 3x10    Unilateral Ext  11# 2x12 11# 3x10    [place] ER/IR ER 7# IR 13# 3x10 ea ER 7# IR 13# 3x10 ea   Cable curl in shoulder Ext 10# 3x15 10# 3x15    Prone I/T/Y 2x20 ea  2x20 ea   90/90 KB Carry    4 laps mirror 15# KB      Ther Activity     UBE 4/4 L3  4/4 L3   Pulleys      Dumbbell Press    2x5 25#   Seated 2x5, 2x3 25#    Gait Training               Modalities

## 2024-11-11 ENCOUNTER — OFFICE VISIT (OUTPATIENT)
Dept: PHYSICAL THERAPY | Facility: REHABILITATION | Age: 74
End: 2024-11-11
Payer: MEDICARE

## 2024-11-11 DIAGNOSIS — M75.42 IMPINGEMENT SYNDROME OF LEFT SHOULDER: ICD-10-CM

## 2024-11-11 DIAGNOSIS — M75.22 BICEPS TENDONITIS ON LEFT: Primary | ICD-10-CM

## 2024-11-11 PROCEDURE — 97530 THERAPEUTIC ACTIVITIES: CPT

## 2024-11-11 PROCEDURE — 97110 THERAPEUTIC EXERCISES: CPT

## 2024-11-11 NOTE — PROGRESS NOTES
Daily Note     Today's date: 2024  Patient name: Jim Snow  : 1950  MRN: [id number]  Referring provider: Jim Diaz DO  Dx:   Encounter Diagnosis     ICD-10-CM    1. Biceps tendonitis on left  M75.22       2. Impingement syndrome of left shoulder  M75.42                       Start Time: 1135  Stop Time: 1213  Total time in clinic (min): 38 minutes    Subjective: Bandar notes that his last day will be this Thursday as he is going away next week for a few months.    Objective: See treatment diary below    Assessment: Continues to progress session to session. Pt continues to be most challenged by shoulder presses and push ups this visit. Tolerated treatment well. Patient demonstrated fatigue post treatment, exhibited good technique with therapeutic exercises, and would benefit from continued PT. 1:1 with Cristina Ramos DPT for entirety of session.         Plan: Continue per plan of care.      Precautions: n/a    POC expires Unit limit Auth Expiration date PT/OT + Visit Limit?   2024 BOMN N/a BOMN         Visit/Unit Tracking  AUTH Status:  Date 10/3 10/7 10/10 10/14 10/17 10/21 10/24 10/28 10/31 11/7 11/11   N/a Used 9 10 11 12 13 14 15 16 17 18 19    Remaining                  Pertinent Findings:      POC End Date: 2024                                                                                          Test / Measure  9/5 10/24   FOTO (Predicted 74) 74 82   Left shoulder F  90% !    Left shoulder Abd 75%     Left shoudler Abd Strength        Access Code: [email or web address]  URL: [email or web address]  Date: 2024  Prepared by: Cristina Ramos    Exercises  - Seated Shoulder Flexion Slide at Table Top with Forearm in Neutral  - 2 x daily - 10 reps - 10 hold  - Supine Shoulder External Rotation in 45 Degrees Abduction AAROM with Dowel  - 2 x daily - 20 reps - 5 hold  - Shoulder External Rotation and Scapular Retraction with Resistance  - 2 x daily - 2 sets - 10 reps - 5 hold  -  Standing Shoulder Flexion with Resistance  - 2 x daily - 2 sets - 10 reps  - Standing Single Arm Shoulder Abduction with Resistance  - 2 x daily - 2 sets - 10 reps  - Standing Single Arm Elbow Flexion with Resistance  - 2 x daily - 2 sets - 10 reps    Manuals 11/4 11/7 11/11   Post GHJ                        Neuro Re-Ed      Push Up Plus  Kneeling Push Up 2x10  Kneeling Push Up 3x10                                 Ther Ex      Unilateral Rows  27.5# 3x10  27.5 3x10  27.5 3x10    Unilateral Ext  11# 2x12 11# 3x10  11# 3x10    Melvin ER/IR ER 7# IR 13# 3x10 ea ER 7# IR 13# 3x10 ea ER 7# IR 13# 3x10 ea   Cable curl in shoulder Ext 10# 3x15 10# 3x15 10# 3x15    Prone I/T/Y 2x20 ea  2x20 ea 2x20 ea   90/90 KB Carry    4 laps mirror 15# KB      4 laps mirror 15# KB   Ther Activity      UBE 4/4 L3  4/4 L3 4/4 L3   Pulleys       Dumbbell Press    2x5 25#   Seated 2x5, 2x3 25#    20# 2x5   Gait Training                  Modalities

## 2024-11-14 ENCOUNTER — OFFICE VISIT (OUTPATIENT)
Dept: PHYSICAL THERAPY | Facility: REHABILITATION | Age: 74
End: 2024-11-14
Payer: MEDICARE

## 2024-11-14 DIAGNOSIS — M75.42 IMPINGEMENT SYNDROME OF LEFT SHOULDER: ICD-10-CM

## 2024-11-14 DIAGNOSIS — M75.22 BICEPS TENDONITIS ON LEFT: Primary | ICD-10-CM

## 2024-11-14 PROCEDURE — 97530 THERAPEUTIC ACTIVITIES: CPT

## 2024-11-14 PROCEDURE — 97110 THERAPEUTIC EXERCISES: CPT

## 2024-11-14 NOTE — PROGRESS NOTES
PT Discharge     Today's date: 2024  Patient name: Jim Snow  : 1950  MRN: [id number]  Referring provider: Jim Diaz DO  Dx:   Encounter Diagnosis     ICD-10-CM    1. Biceps tendonitis on left  M75.22       2. Impingement syndrome of left shoulder  M75.42         Start Time: 1145  Stop Time: 1223  Total time in clinic (min): 38 minutes    Subjective: Bandar notes that he is going away and today will be his last day.     Pain: Best- 0/10, Worst- 1/10, Current- 1/10    Objective: See treatment diary below    Assessment: Bandar made a lot of progress thus far and notes he rarely feels pain. He notes he feels much stronger then when he started. Tolerated treatment well. Patient demonstrated fatigue post treatment, exhibited good technique with therapeutic exercises, and would benefit from continued PT. Bandar will be discharged as he is going away and feels he will be able to continue progressing with HEP on his own. 1:1 with Cristina Ramos DPT for entirety of session.         Plan: Discharge to [facility].      Precautions: n/a    POC expires Unit limit Auth Expiration date PT/OT + Visit Limit?   2024 BOMN N/a BOMN         Visit/Unit Tracking  AUTH Status:  Date    N/a Used 18 19 20 21    Remaining           Pertinent Findings:      POC End Date: 2024                                                                                          Test / Measure  9/5 10/24   FOTO (Predicted 74) 74 82   Left shoulder F  90% !    Left shoulder Abd 75%     Left shoudler Abd Strength        Access Code: [email or web address]  URL: [email or web address]  Date: 2024  Prepared by: Cristina Ramos    Exercises  - Seated Shoulder Flexion Slide at Table Top with Forearm in Neutral  - 2 x daily - 10 reps - 10 hold  - Supine Shoulder External Rotation in 45 Degrees Abduction AAROM with Dowel  - 2 x daily - 20 reps - 5 hold  - Shoulder External Rotation and Scapular Retraction with Resistance  -  2 x daily - 2 sets - 10 reps - 5 hold  - Standing Shoulder Flexion with Resistance  - 2 x daily - 2 sets - 10 reps  - Standing Single Arm Shoulder Abduction with Resistance  - 2 x daily - 2 sets - 10 reps  - Standing Single Arm Elbow Flexion with Resistance  - 2 x daily - 2 sets - 10 reps    Manuals 11/4 11/7 11/11 11/14   Post GHJ                            Neuro Re-Ed       Push Up Plus  Kneeling Push Up 2x10  Kneeling Push Up 3x10 2x10                                       Ther Ex       Unilateral Rows  27.5# 3x10  27.5 3x10  27.5 3x10  27.5# 3x10    Unilateral Ext  11# 2x12 11# 3x10  11# 3x10  11# 3x10    Melvin ER/IR ER 7# IR 13# 3x10 ea ER 7# IR 13# 3x10 ea ER 7# IR 13# 3x10 ea ER 7# IR 13# 3x10 ea   Cable curl in shoulder Ext 10# 3x15 10# 3x15 10# 3x15 10# 3x15    Prone I/T/Y 2x20 ea  2x20 ea 2x20 ea 2x20ea   90/90 KB Carry    4 laps mirror 15# KB      4 laps mirror 15# KB   4 laps mirror 15# KB    Ther Activity       UBE 4/4 L3  4/4 L3 4/4 L3 4/4 L3    Pulleys        Dumbbell Press    2x5 25#   Seated 2x5, 2x3 25#    20# 2x5   20# 2x5   Gait Training                     Modalities

## 2024-12-03 NOTE — PROGRESS NOTES
Orders:    traMADol (ULTRAM) 50 MG tablet; Take 1 tablet by mouth Every 12 (Twelve) Hours As Needed for Moderate Pain.     Daily Note     Today's date: 2024  Patient name: Jim Snow  : 1950  MRN: [id number]  Referring provider: Jim Diaz DO  Dx:   Encounter Diagnosis     ICD-10-CM    1. Impingement syndrome of left shoulder  M75.42       2. Biceps tendonitis on left  M75.22                   Start Time: 1245  Stop Time: 1330  Total time in clinic (min): 45 minutes    Subjective: Bandar has no new complaints.    Objective: See treatment diary below      Assessment: Bandar tolerated treatment well with appropriate muscle fatigue experienced with ex's. Bandar continues to make steady progress and will benefit from transitioning toward more difficult variations. The progressions used today were tolerated well with no increase in symptoms. Bandar would benefit from continued PT and compliance with HEP. 1:1 with Cristina Ramos, DPT for 32 min and IEP for remainder.         Plan: Continue per plan of care.      Precautions: n/a    POC expires Unit limit Auth Expiration date PT/OT + Visit Limit?   2024 BOMN N/a BOMN         Visit/Unit Tracking  AUTH Status:  Date    N/a Used 1 2 3 4 5 6    Remaining             Pertinent Findings:      POC End Date: 2024                                                                                          Test / Measure     FOTO (Predicted 74) 74   Left shoulder F  90% !   Left shoulder Abd 75%    Left shoudler Abd Strength       Access Code: [email or web address]  URL: [email or web address]  Date: 2024  Prepared by: Cristina Ramos    Exercises  - Seated Shoulder Flexion Slide at Table Top with Forearm in Neutral  - 2 x daily - 10 reps - 10 hold  - Supine Shoulder External Rotation in 45 Degrees Abduction AAROM with Dowel  - 2 x daily - 20 reps - 5 hold  - Shoulder External Rotation and Scapular Retraction with Resistance  - 2 x daily - 2 sets - 10 reps - 5 hold  - Standing Shoulder Flexion with Resistance  - 2 x daily - 2 sets - 10 reps  - Standing  "Single Arm Shoulder Abduction with Resistance  - 2 x daily - 2 sets - 10 reps  - Standing Single Arm Elbow Flexion with Resistance  - 2 x daily - 2 sets - 10 reps    Manuals 9/5 9/9 9/13 9/16 9/19 9/23 9/26      Post GHJ  TT AW                                                  Neuro Re-Ed             Body Blade ER/IR  2x30\"  30\"x2 ea  2x30\" 2x30\"  2x30\"  2x40\"      Supine Punch   2x10 5#  5# 2x10           Push Up Plus       10x, 20x  Counter 2x10       Rhythmic Stabilization    1 min x2           D1/D2 Flexion      2x20 ea mtb  2x10 ea 5#        D1/D2 Ext     20x ea  2x10 ea 7#                                 Ther Ex             Wall Walks              Unilateral Rows   2x10 16# 16# 3x10  18# 3x10  18# 3x10  20# 3x10  20# 3x10       Unilateral Ext     8# 2x10  8# 2x10  8# 2x10  8# 3x10       Pull Down              Shoulder Abd/F    2x10 3# ea  2x10 5# ea         TB ER/IR  Btb 2x10 ER; 2 Btb IR   IR 4#   ER 4#  2x10 ea  IR 8# ER 4# 2x10 ea  IR 9# ER 5# 2x10 ea  ER 5# IR 9# 2x10 ea ER 5# IR 10# 2x10 ea       Cable curl with shoulder Ext  2x10 10# 10# 2x10  10# 3x10  15# 1x10, 1x6  15# 2x10  15# 2x10       Foam Roll Press w/ ER  2x10 ytb OTB 2x10  OTB 2x10 OTB 2x10 OTB 2x10         Prone I/T/Y       2x10 ea       Ther Activity             UBE  3'/3' 3'/3' 4/4 4/4 4/4 4/4      Pulleys   30x  5\"x30  5\"x30          Gait Training                                       Modalities                                              "

## 2025-01-08 DIAGNOSIS — E78.01 FAMILIAL HYPERCHOLESTEROLEMIA: ICD-10-CM

## 2025-01-08 DIAGNOSIS — I10 ESSENTIAL HYPERTENSION: ICD-10-CM

## 2025-01-08 DIAGNOSIS — E11.65 TYPE 2 DIABETES MELLITUS WITH HYPERGLYCEMIA, WITHOUT LONG-TERM CURRENT USE OF INSULIN (HCC): ICD-10-CM

## 2025-01-09 RX ORDER — ATORVASTATIN CALCIUM 10 MG/1
10 TABLET, FILM COATED ORAL DAILY
Qty: 100 TABLET | Refills: 1 | Status: SHIPPED | OUTPATIENT
Start: 2025-01-09 | End: 2025-01-10 | Stop reason: SDUPTHER

## 2025-01-09 RX ORDER — LOSARTAN POTASSIUM 50 MG/1
50 TABLET ORAL DAILY
Qty: 100 TABLET | Refills: 1 | Status: SHIPPED | OUTPATIENT
Start: 2025-01-09 | End: 2025-01-10 | Stop reason: SDUPTHER

## 2025-01-10 DIAGNOSIS — E78.01 FAMILIAL HYPERCHOLESTEROLEMIA: ICD-10-CM

## 2025-01-10 DIAGNOSIS — E11.65 TYPE 2 DIABETES MELLITUS WITH HYPERGLYCEMIA, WITHOUT LONG-TERM CURRENT USE OF INSULIN (HCC): ICD-10-CM

## 2025-01-10 DIAGNOSIS — I10 ESSENTIAL HYPERTENSION: ICD-10-CM

## 2025-01-13 RX ORDER — METFORMIN HYDROCHLORIDE 500 MG/1
500 TABLET, EXTENDED RELEASE ORAL 2 TIMES DAILY WITH MEALS
Qty: 200 TABLET | Refills: 0 | Status: SHIPPED | OUTPATIENT
Start: 2025-01-13

## 2025-01-13 RX ORDER — ATORVASTATIN CALCIUM 10 MG/1
10 TABLET, FILM COATED ORAL DAILY
Qty: 100 TABLET | Refills: 0 | Status: SHIPPED | OUTPATIENT
Start: 2025-01-13

## 2025-01-13 RX ORDER — LOSARTAN POTASSIUM 50 MG/1
50 TABLET ORAL DAILY
Qty: 100 TABLET | Refills: 0 | Status: SHIPPED | OUTPATIENT
Start: 2025-01-13

## 2025-01-17 DIAGNOSIS — I10 ESSENTIAL HYPERTENSION: ICD-10-CM

## 2025-01-17 DIAGNOSIS — E78.01 FAMILIAL HYPERCHOLESTEROLEMIA: ICD-10-CM

## 2025-01-17 DIAGNOSIS — E11.65 TYPE 2 DIABETES MELLITUS WITH HYPERGLYCEMIA, WITHOUT LONG-TERM CURRENT USE OF INSULIN (HCC): ICD-10-CM

## 2025-01-17 RX ORDER — LOSARTAN POTASSIUM 50 MG/1
50 TABLET ORAL DAILY
Qty: 100 TABLET | Refills: 0 | OUTPATIENT
Start: 2025-01-17

## 2025-01-17 RX ORDER — ATORVASTATIN CALCIUM 10 MG/1
10 TABLET, FILM COATED ORAL DAILY
Qty: 100 TABLET | Refills: 0 | OUTPATIENT
Start: 2025-01-17

## 2025-01-17 RX ORDER — METFORMIN HYDROCHLORIDE 500 MG/1
500 TABLET, EXTENDED RELEASE ORAL 2 TIMES DAILY WITH MEALS
Qty: 200 TABLET | Refills: 0 | OUTPATIENT
Start: 2025-01-17

## 2025-04-10 ENCOUNTER — APPOINTMENT (OUTPATIENT)
Dept: LAB | Age: 75
End: 2025-04-10
Payer: MEDICARE

## 2025-04-10 DIAGNOSIS — E11.65 TYPE 2 DIABETES MELLITUS WITH HYPERGLYCEMIA, WITHOUT LONG-TERM CURRENT USE OF INSULIN (HCC): ICD-10-CM

## 2025-04-10 DIAGNOSIS — I10 PRIMARY HYPERTENSION: ICD-10-CM

## 2025-04-10 LAB
ANION GAP SERPL CALCULATED.3IONS-SCNC: 11 MMOL/L (ref 4–13)
BUN SERPL-MCNC: 15 MG/DL (ref 5–25)
CALCIUM SERPL-MCNC: 9.5 MG/DL (ref 8.4–10.2)
CHLORIDE SERPL-SCNC: 104 MMOL/L (ref 96–108)
CO2 SERPL-SCNC: 26 MMOL/L (ref 21–32)
CREAT SERPL-MCNC: 1.03 MG/DL (ref 0.6–1.3)
EST. AVERAGE GLUCOSE BLD GHB EST-MCNC: 120 MG/DL
GFR SERPL CREATININE-BSD FRML MDRD: 72 ML/MIN/1.73SQ M
GLUCOSE P FAST SERPL-MCNC: 112 MG/DL (ref 65–99)
HBA1C MFR BLD: 5.8 %
POTASSIUM SERPL-SCNC: 4.2 MMOL/L (ref 3.5–5.3)
SODIUM SERPL-SCNC: 141 MMOL/L (ref 135–147)

## 2025-04-10 PROCEDURE — 83036 HEMOGLOBIN GLYCOSYLATED A1C: CPT

## 2025-04-10 PROCEDURE — 36415 COLL VENOUS BLD VENIPUNCTURE: CPT

## 2025-04-10 PROCEDURE — 80048 BASIC METABOLIC PNL TOTAL CA: CPT

## 2025-04-15 ENCOUNTER — OFFICE VISIT (OUTPATIENT)
Age: 75
End: 2025-04-15
Payer: MEDICARE

## 2025-04-15 VITALS
TEMPERATURE: 97.2 F | BODY MASS INDEX: 27.62 KG/M2 | OXYGEN SATURATION: 96 % | HEIGHT: 67 IN | WEIGHT: 176 LBS | DIASTOLIC BLOOD PRESSURE: 66 MMHG | RESPIRATION RATE: 16 BRPM | HEART RATE: 73 BPM | SYSTOLIC BLOOD PRESSURE: 128 MMHG

## 2025-04-15 DIAGNOSIS — F17.211 CIGARETTE NICOTINE DEPENDENCE IN REMISSION: ICD-10-CM

## 2025-04-15 DIAGNOSIS — Z00.00 HEALTHCARE MAINTENANCE: Primary | ICD-10-CM

## 2025-04-15 DIAGNOSIS — Z12.5 SCREENING FOR PROSTATE CANCER: ICD-10-CM

## 2025-04-15 DIAGNOSIS — E11.65 TYPE 2 DIABETES MELLITUS WITH HYPERGLYCEMIA, WITHOUT LONG-TERM CURRENT USE OF INSULIN (HCC): ICD-10-CM

## 2025-04-15 DIAGNOSIS — E78.01 FAMILIAL HYPERCHOLESTEROLEMIA: ICD-10-CM

## 2025-04-15 DIAGNOSIS — I10 PRIMARY HYPERTENSION: ICD-10-CM

## 2025-04-15 PROCEDURE — 99214 OFFICE O/P EST MOD 30 MIN: CPT | Performed by: INTERNAL MEDICINE

## 2025-04-15 PROCEDURE — G2211 COMPLEX E/M VISIT ADD ON: HCPCS | Performed by: INTERNAL MEDICINE

## 2025-04-15 NOTE — ASSESSMENT & PLAN NOTE
History of hyperlipidemia and he remains on atorvastatin at 10 mg daily.  Patient continue present medication and we will check another lipid profile with his next visit.  Patient is a meat eater and we discussed the importance of watching his intake of fats and cholesterol with his diet.

## 2025-04-15 NOTE — ASSESSMENT & PLAN NOTE
Patient will be due for Medicare wellness visit when he returns to the office in September.  He was given a slip for blood test to be performed prior to that visit.  In the interim patient was told of any new medical problems or concerns to please call.    Orders:    Comprehensive metabolic panel; Future    CBC and differential; Future    Lipid panel; Future    Urinalysis with microscopic; Future    Hemoglobin A1C; Future    Albumin / creatinine urine ratio; Future    PSA, Total Screen; Future

## 2025-04-15 NOTE — ASSESSMENT & PLAN NOTE
Patient does have a history of diabetes mellitus type 2.  He remains on medication specifically metformin which has kept his blood sugars under control.  As noted his hemoglobin A1c has gone down to 5.8.  He states that he was physically active while he was in [place] for the past 6 months.  We will check a fasting blood sugar hemoglobin A1c with his next visit and diabetic foot exam was performed today which was completely normal.  Patient is up-to-date with routine eye exams  Lab Results   Component Value Date    HGBA1C 5.8 (H) 04/10/2025       Orders:    Hemoglobin A1C; Future    Albumin / creatinine urine ratio; Future

## 2025-04-15 NOTE — PROGRESS NOTES
Name: Jim Snow      : 1950      MRN: [id number]  Encounter Provider: Riky Menjivar DO  Encounter Date: 4/15/2025   Encounter department: [facility] INTERNAL MEDICINE    Assessment & Plan  Cigarette nicotine dependence in remission    Orders:  •  CT lung screening program; Future    Primary hypertension  Patient does have a longstanding history of hypertension.  He remains on medication, low-dose of losartan 50 mg daily which also offers some protection of his kidney function with also history of diabetes.  Will continue present medication surveillance and we will check also on his renal function with his next visit.         Type 2 diabetes mellitus with hyperglycemia, without long-term current use of insulin (HCC)  Patient does have a history of diabetes mellitus type 2.  He remains on medication specifically metformin which has kept his blood sugars under control.  As noted his hemoglobin A1c has gone down to 5.8.  He states that he was physically active while he was in [place] for the past 6 months.  We will check a fasting blood sugar hemoglobin A1c with his next visit and diabetic foot exam was performed today which was completely normal.  Patient is up-to-date with routine eye exams  Lab Results   Component Value Date    HGBA1C 5.8 (H) 04/10/2025       Orders:  •  Hemoglobin A1C; Future  •  Albumin / creatinine urine ratio; Future    Healthcare maintenance  Patient will be due for Medicare wellness visit when he returns to the office in September.  He was given a slip for blood test to be performed prior to that visit.  In the interim patient was told of any new medical problems or concerns to please call.    Orders:  •  Comprehensive metabolic panel; Future  •  CBC and differential; Future  •  Lipid panel; Future  •  Urinalysis with microscopic; Future  •  Hemoglobin A1C; Future  •  Albumin / creatinine urine ratio; Future  •  PSA, Total Screen; Future    Screening for prostate  cancer    Orders:  •  PSA, Total Screen; Future    Familial hypercholesterolemia  History of hyperlipidemia and he remains on atorvastatin at 10 mg daily.  Patient continue present medication and we will check another lipid profile with his next visit.  Patient is a meat eater and we discussed the importance of watching his intake of fats and cholesterol with his diet.              History of Present Illness     Patient is a 74-year-old male history of medical problems outlined previously who is here today for routine follow-up after 6 months.  Time accompanied by his wife.  Patient states in general doing well and he did have labs performed prior to the visit today and we did discuss results at length with the patient.  Patient does have a history of tobacco abuse in the past greater than 20 pack years and is eligible for lung cancer screening and this will be arranged.  Denies any symptoms such as cough or shortness of breath.  Review of Systems   Constitutional: Negative.    HENT: Negative.     Eyes: Negative.    Respiratory: Negative.     Cardiovascular: Negative.    Gastrointestinal: Negative.    Endocrine: Negative.    Genitourinary: Negative.    Musculoskeletal: Negative.    Skin: Negative.    Allergic/Immunologic: Negative.    Neurological: Negative.    Hematological: Negative.    Psychiatric/Behavioral: Negative.     Past Medical History:   Diagnosis Date   • Allergic Bees   • Diabetes (HCC)    • Diabetes mellitus (HCC)    • HL (hearing loss) 11/11/2020   • Hyperlipidemia    • Hypertension    • Visual impairment     floater in eye     Past Surgical History:   Procedure Laterality Date   • APPENDECTOMY       Family History   Problem Relation Age of Onset   • Parkinsonism Mother    • Arthritis Mother    • Hearing loss Mother         at age 91   • Dementia Mother    • Diabetes Father    • Pancreatic cancer Father    • Colon cancer Father    • Asthma Father    • Cancer Father         colon cancer   • Dementia  Father    • Hypertension Family    • Hearing loss Family      Social History     Tobacco Use   • Smoking status: Former     Types: Cigars   • Smokeless tobacco: Never   Vaping Use   • Vaping status: Never Used   Substance and Sexual Activity   • Alcohol use: Yes     Alcohol/week: 7.0 standard drinks of alcohol     Types: 7 Glasses of wine per week     Comment: wine   • Drug use: Never   • Sexual activity: Not Currently     Partners: Female     Birth control/protection: None     Current Outpatient Medications on File Prior to Visit   Medication Sig   • albuterol (PROVENTIL HFA,VENTOLIN HFA) 90 mcg/act inhaler Inhale 2 puffs every 6 (six) hours as needed for wheezing or shortness of breath   • atorvastatin (LIPITOR) 10 mg tablet Take 1 tablet (10 mg total) by mouth daily   • clotrimazole-betamethasone (LOTRISONE) 1-0.05 % cream Apply topically 2 (two) times a day   • ergocalciferol (VITAMIN D2) 50,000 units Take by mouth   • fluticasone (FLONASE) 50 mcg/act nasal spray 1 spray into each nostril daily   • glucose blood (OneTouch Ultra) test strip Test blood sugars daily   • Lancets (onetouch ultrasoft) lancets Check blood sugars once daily. Please substitute with appropriate alternative as covered by patient's insurance. Dx: E11.65.  NPI [id number]   • losartan (COZAAR) 50 mg tablet Take 1 tablet (50 mg total) by mouth daily   • metFORMIN (GLUCOPHAGE-XR) 500 mg 24 hr tablet Take 1 tablet (500 mg total) by mouth 2 (two) times a day with meals   • Blood Glucose Monitoring Suppl (ONE TOUCH ULTRA 2) w/Device KIT Check blood sugars once daily. Please substitute with appropriate alternative as covered by patient's insurance. Dx: E11.65.  NPI [id number]   • Blood Glucose Monitoring Suppl (OneTouch Verio Reflect) w/Device KIT Check blood sugars once daily. Please substitute with appropriate alternative as covered by patient's insurance. Dx: E11.65   • EPINEPHrine (EPIPEN) 0.3 mg/0.3 mL SOAJ Inject 0.3 mL (0.3 mg total) into a  "muscle once for 1 dose     Allergies   Allergen Reactions   • Bee Venom Syncope     Immunization History   Administered Date(s) Administered   • COVID-19 MODERNA VACC 0.5 ML IM 02/19/2021, 03/19/2021, 10/30/2021, 04/19/2022   • COVID-19 Moderna Vac BIVALENT 12 Yr+ IM 0.5 ML 10/03/2022, 05/22/2023   • COVID-19 Moderna mRNA Vaccine 12 Yr+ 50 mcg/0.5 mL (Spikevax) 09/28/2023, 09/16/2024   • INFLUENZA 10/01/2015, 09/26/2016, 09/29/2017, 09/26/2018, 11/01/2018, 10/03/2022, 11/01/2023   • Influenza, high dose seasonal 0.7 mL 09/23/2020, 12/09/2021   • Pneumococcal Conjugate 13-Valent 12/09/2021   • Pneumococcal Conjugate Vaccine 20-valent (Pcv20), Polysace 05/16/2023   • Respiratory Syncytial Virus Vaccine (Recombinant, Adjuvanted) 11/08/2023     Objective   /66   Pulse 73   Temp (!) 97.2 °F (36.2 °C)   Resp 16   Ht 5' 7\" (1.702 m)   Wt 79.8 kg (176 lb)   SpO2 96%   BMI 27.57 kg/m²     Physical Exam  Vitals and nursing note reviewed.   Constitutional:       General: He is not in acute distress.     Appearance: Normal appearance. He is not ill-appearing, toxic-appearing or diaphoretic.      Comments: Pleasant articulate overweight but muscular 74-year-old male who is awake alert in no acute distress oriented x 3 accompanied by his wife   HENT:      Head: Normocephalic and atraumatic.      Right Ear: Tympanic membrane, ear canal and external ear normal. There is no impacted cerumen.      Left Ear: Tympanic membrane, ear canal and external ear normal. There is no impacted cerumen.      Nose: Nose normal. No congestion or rhinorrhea.      Mouth/Throat:      Mouth: Mucous membranes are moist.      Pharynx: Oropharynx is clear. No oropharyngeal exudate or posterior oropharyngeal erythema.   Eyes:      General: No scleral icterus.        Right eye: No discharge.         Left eye: No discharge.      Extraocular Movements: Extraocular movements intact.      Conjunctiva/sclera: Conjunctivae normal.      Pupils: Pupils " are equal, round, and reactive to light.   Neck:      Vascular: No carotid bruit.   Cardiovascular:      Rate and Rhythm: Normal rate and regular rhythm.      Pulses: Normal pulses.      Heart sounds: Normal heart sounds. No murmur heard.     No friction rub. No gallop.   Pulmonary:      Effort: Pulmonary effort is normal. No respiratory distress.      Breath sounds: Normal breath sounds. No stridor. No wheezing, rhonchi or rales.   Chest:      Chest wall: No tenderness.   Abdominal:      General: Abdomen is flat. Bowel sounds are normal. There is no distension.      Palpations: Abdomen is soft. There is no mass.      Tenderness: There is no abdominal tenderness. There is no right CVA tenderness, left CVA tenderness, guarding or rebound.      Hernia: No hernia is present.   Genitourinary:     Penis: Normal.       Testes: Normal.      Prostate: Normal.      Rectum: Normal.   Musculoskeletal:         General: No swelling, tenderness or deformity. Normal range of motion.      Cervical back: Normal range of motion and neck supple. No rigidity or tenderness.      Right lower leg: No edema.   Lymphadenopathy:      Cervical: No cervical adenopathy.   Skin:     General: Skin is warm and dry.      Capillary Refill: Capillary refill takes less than 2 seconds.      Coloration: Skin is not jaundiced or pale.      Findings: No bruising, erythema, lesion or rash.   Neurological:      General: No focal deficit present.      Mental Status: He is alert and oriented to person, place, and time. Mental status is at baseline.      Cranial Nerves: No cranial nerve deficit.      Sensory: No sensory deficit.      Motor: No weakness.      Coordination: Coordination normal.      Gait: Gait normal.      Deep Tendon Reflexes: Reflexes normal.   Psychiatric:         Mood and Affect: Mood normal.         Behavior: Behavior normal.         Thought Content: Thought content normal.         Judgment: Judgment normal.   I discussed with him that he  is a candidate for lung cancer CT screening.     The following Shared Decision-Making points were covered:  Benefits of screening were discussed, including the rates of reduction in death from lung cancer and other causes.  Harms of screening were reviewed, including false positive tests, radiation exposure levels, risks of invasive procedures, risks of complications of screening, and risk of overdiagnosis.  I counseled on the importance of adherence to annual lung cancer LDCT screening, impact of co-morbidities, and ability or willingness to undergo diagnosis and treatment.  I counseled on the importance of maintaining abstinence as a former smoker or was counseled on the importance of smoking cessation if a current smoker    Review of Eligibility Criteria: He meets all of the criteria for Lung Cancer Screening.   He is 74 y.o.   He has 20 pack year tobacco history and is a current smoker or has quit within the past 15 years  He presents no signs or symptoms of lung cancer    After discussion, the patient decided to elect lung cancer screening.

## 2025-04-15 NOTE — ASSESSMENT & PLAN NOTE
Patient does have a longstanding history of hypertension.  He remains on medication, low-dose of losartan 50 mg daily which also offers some protection of his kidney function with also history of diabetes.  Will continue present medication surveillance and we will check also on his renal function with his next visit.

## 2025-04-15 NOTE — PROGRESS NOTES
Diabetic Foot Exam    Patient's shoes and socks removed.    Right Foot/Ankle   Right Foot Inspection  Skin Exam: skin normal and skin intact. No dry skin, no warmth, no callus, no erythema, no maceration, no abnormal color, no pre-ulcer, no ulcer and no callus.     Toe Exam: ROM and strength within normal limits. No swelling, no tenderness, erythema and  no right toe deformity    Sensory   Vibration: intact  Proprioception: intact  Monofilament testing: intact    Vascular  Capillary refills: < 3 seconds  The right DP pulse is 2+. The right PT pulse is 2+.     Left Foot/Ankle  Left Foot Inspection  Skin Exam: skin normal and skin intact. No dry skin, no warmth, no erythema, no maceration, normal color, no pre-ulcer, no ulcer and no callus.     Toe Exam: ROM and strength within normal limits. No swelling, no tenderness, no erythema and no left toe deformity.     Sensory   Vibration: intact  Proprioception: intact  Monofilament testing: intact    Vascular  Capillary refills: < 3 seconds  The left DP pulse is 2+. The left PT pulse is 2+.     Assign Risk Category  No deformity present  No loss of protective sensation  No weak pulses  Risk: 0

## 2025-04-18 DIAGNOSIS — E78.01 FAMILIAL HYPERCHOLESTEROLEMIA: ICD-10-CM

## 2025-04-18 DIAGNOSIS — E11.65 TYPE 2 DIABETES MELLITUS WITH HYPERGLYCEMIA, WITHOUT LONG-TERM CURRENT USE OF INSULIN (HCC): ICD-10-CM

## 2025-04-18 DIAGNOSIS — I10 ESSENTIAL HYPERTENSION: ICD-10-CM

## 2025-04-18 RX ORDER — ATORVASTATIN CALCIUM 10 MG/1
10 TABLET, FILM COATED ORAL DAILY
Qty: 100 TABLET | Refills: 1 | Status: SHIPPED | OUTPATIENT
Start: 2025-04-18

## 2025-04-18 RX ORDER — LOSARTAN POTASSIUM 50 MG/1
50 TABLET ORAL DAILY
Qty: 100 TABLET | Refills: 1 | Status: SHIPPED | OUTPATIENT
Start: 2025-04-18

## 2025-04-18 RX ORDER — METFORMIN HYDROCHLORIDE 500 MG/1
500 TABLET, EXTENDED RELEASE ORAL 2 TIMES DAILY WITH MEALS
Qty: 200 TABLET | Refills: 1 | Status: SHIPPED | OUTPATIENT
Start: 2025-04-18

## 2025-04-28 ENCOUNTER — HOSPITAL ENCOUNTER (OUTPATIENT)
Dept: RADIOLOGY | Facility: HOSPITAL | Age: 75
Discharge: HOME | End: 2025-04-28
Attending: INTERNAL MEDICINE

## 2025-04-28 DIAGNOSIS — E78.01 FAMILIAL HYPERCHOLESTEROLEMIA: ICD-10-CM

## 2025-04-28 DIAGNOSIS — F17.211 CIGARETTE NICOTINE DEPENDENCE IN REMISSION: ICD-10-CM

## 2025-04-28 DIAGNOSIS — E11.65 TYPE 2 DIABETES MELLITUS WITH HYPERGLYCEMIA, WITHOUT LONG-TERM CURRENT USE OF INSULIN (HCC): ICD-10-CM

## 2025-04-28 DIAGNOSIS — I10 ESSENTIAL HYPERTENSION: ICD-10-CM

## 2025-04-29 RX ORDER — LOSARTAN POTASSIUM 50 MG/1
50 TABLET ORAL DAILY
Qty: 100 TABLET | Refills: 1 | Status: SHIPPED | OUTPATIENT
Start: 2025-04-29

## 2025-04-29 RX ORDER — ATORVASTATIN CALCIUM 10 MG/1
10 TABLET, FILM COATED ORAL DAILY
Qty: 100 TABLET | Refills: 1 | Status: SHIPPED | OUTPATIENT
Start: 2025-04-29

## 2025-05-15 PROBLEM — Z00.00 HEALTHCARE MAINTENANCE: Status: RESOLVED | Noted: 2018-09-07 | Resolved: 2025-05-15

## 2025-07-15 ENCOUNTER — OFFICE VISIT (OUTPATIENT)
Age: 75
End: 2025-07-15
Payer: MEDICARE

## 2025-07-15 VITALS
WEIGHT: 176 LBS | BODY MASS INDEX: 27.57 KG/M2 | RESPIRATION RATE: 16 BRPM | OXYGEN SATURATION: 96 % | HEART RATE: 77 BPM

## 2025-07-15 DIAGNOSIS — J40 BRONCHITIS: ICD-10-CM

## 2025-07-15 DIAGNOSIS — J01.10 ACUTE NON-RECURRENT FRONTAL SINUSITIS: Primary | ICD-10-CM

## 2025-07-15 PROCEDURE — G2211 COMPLEX E/M VISIT ADD ON: HCPCS | Performed by: STUDENT IN AN ORGANIZED HEALTH CARE EDUCATION/TRAINING PROGRAM

## 2025-07-15 PROCEDURE — 99213 OFFICE O/P EST LOW 20 MIN: CPT | Performed by: STUDENT IN AN ORGANIZED HEALTH CARE EDUCATION/TRAINING PROGRAM

## 2025-07-15 RX ORDER — ALBUTEROL SULFATE 90 UG/1
2 INHALANT RESPIRATORY (INHALATION) EVERY 6 HOURS PRN
Qty: 6.7 G | Refills: 2 | Status: SHIPPED | OUTPATIENT
Start: 2025-07-15

## 2025-07-15 RX ORDER — BENZONATATE 100 MG/1
100 CAPSULE ORAL 3 TIMES DAILY PRN
Qty: 20 CAPSULE | Refills: 0 | Status: SHIPPED | OUTPATIENT
Start: 2025-07-15

## 2025-07-25 DIAGNOSIS — I10 ESSENTIAL HYPERTENSION: ICD-10-CM

## 2025-07-25 DIAGNOSIS — E78.01 FAMILIAL HYPERCHOLESTEROLEMIA: ICD-10-CM

## 2025-07-25 DIAGNOSIS — E11.65 TYPE 2 DIABETES MELLITUS WITH HYPERGLYCEMIA, WITHOUT LONG-TERM CURRENT USE OF INSULIN (HCC): ICD-10-CM

## 2025-07-28 RX ORDER — ATORVASTATIN CALCIUM 10 MG/1
10 TABLET, FILM COATED ORAL DAILY
Qty: 100 TABLET | Refills: 0 | Status: SHIPPED | OUTPATIENT
Start: 2025-07-28

## 2025-07-28 RX ORDER — METFORMIN HYDROCHLORIDE 500 MG/1
500 TABLET, EXTENDED RELEASE ORAL 2 TIMES DAILY WITH MEALS
Qty: 200 TABLET | Refills: 0 | Status: SHIPPED | OUTPATIENT
Start: 2025-07-28

## 2025-07-28 RX ORDER — LOSARTAN POTASSIUM 50 MG/1
50 TABLET ORAL DAILY
Qty: 100 TABLET | Refills: 0 | Status: SHIPPED | OUTPATIENT
Start: 2025-07-28